# Patient Record
Sex: MALE | Race: BLACK OR AFRICAN AMERICAN | Employment: OTHER | URBAN - METROPOLITAN AREA
[De-identification: names, ages, dates, MRNs, and addresses within clinical notes are randomized per-mention and may not be internally consistent; named-entity substitution may affect disease eponyms.]

---

## 2020-07-22 ENCOUNTER — MEDICAL CORRESPONDENCE (OUTPATIENT)
Dept: HEALTH INFORMATION MANAGEMENT | Facility: CLINIC | Age: 80
End: 2020-07-22

## 2020-07-24 DIAGNOSIS — Z11.59 ENCOUNTER FOR SCREENING FOR OTHER VIRAL DISEASES: Primary | ICD-10-CM

## 2020-07-27 ENCOUNTER — TELEPHONE (OUTPATIENT)
Dept: INTERVENTIONAL RADIOLOGY/VASCULAR | Facility: CLINIC | Age: 80
End: 2020-07-27

## 2020-07-27 DIAGNOSIS — R18.8 OTHER ASCITES: ICD-10-CM

## 2020-07-27 DIAGNOSIS — K74.60 LIVER CIRRHOSIS SECONDARY TO NASH (H): Primary | ICD-10-CM

## 2020-07-27 DIAGNOSIS — K75.81 LIVER CIRRHOSIS SECONDARY TO NASH (H): Primary | ICD-10-CM

## 2020-07-27 DIAGNOSIS — K74.60 CIRRHOSIS OF LIVER (H): ICD-10-CM

## 2020-07-30 ENCOUNTER — HOSPITAL ENCOUNTER (OUTPATIENT)
Dept: ULTRASOUND IMAGING | Facility: CLINIC | Age: 80
End: 2020-07-30
Attending: INTERNAL MEDICINE
Payer: MEDICARE

## 2020-07-30 ENCOUNTER — HOSPITAL ENCOUNTER (OUTPATIENT)
Dept: LAB | Facility: CLINIC | Age: 80
End: 2020-07-30
Attending: INTERNAL MEDICINE
Payer: MEDICARE

## 2020-07-30 VITALS
RESPIRATION RATE: 16 BRPM | HEART RATE: 67 BPM | SYSTOLIC BLOOD PRESSURE: 129 MMHG | DIASTOLIC BLOOD PRESSURE: 66 MMHG | OXYGEN SATURATION: 98 %

## 2020-07-30 DIAGNOSIS — K75.81 LIVER CIRRHOSIS SECONDARY TO NASH (H): ICD-10-CM

## 2020-07-30 DIAGNOSIS — K74.60 UNSPECIFIED CIRRHOSIS OF LIVER (H): ICD-10-CM

## 2020-07-30 DIAGNOSIS — R18.8 OTHER ASCITES: ICD-10-CM

## 2020-07-30 DIAGNOSIS — K74.60 LIVER CIRRHOSIS SECONDARY TO NASH (H): ICD-10-CM

## 2020-07-30 DIAGNOSIS — K74.60 CIRRHOSIS OF LIVER (H): ICD-10-CM

## 2020-07-30 LAB
AFP SERPL-MCNC: 4.8 UG/L (ref 0–8)
ALBUMIN SERPL-MCNC: 2.7 G/DL (ref 3.4–5)
ALP SERPL-CCNC: 87 U/L (ref 40–150)
ALT SERPL W P-5'-P-CCNC: 14 U/L (ref 0–70)
ANION GAP SERPL CALCULATED.3IONS-SCNC: 6 MMOL/L (ref 3–14)
APPEARANCE FLD: NORMAL
AST SERPL W P-5'-P-CCNC: 27 U/L (ref 0–45)
BILIRUB DIRECT SERPL-MCNC: 0.7 MG/DL (ref 0–0.2)
BILIRUB SERPL-MCNC: 2.3 MG/DL (ref 0.2–1.3)
BUN SERPL-MCNC: 9 MG/DL (ref 7–30)
CALCIUM SERPL-MCNC: 8.1 MG/DL (ref 8.5–10.1)
CHLORIDE SERPL-SCNC: 105 MMOL/L (ref 94–109)
CO2 SERPL-SCNC: 26 MMOL/L (ref 20–32)
COLOR FLD: YELLOW
CREAT SERPL-MCNC: 0.74 MG/DL (ref 0.66–1.25)
ERYTHROCYTE [DISTWIDTH] IN BLOOD BY AUTOMATED COUNT: 14.8 % (ref 10–15)
GFR SERPL CREATININE-BSD FRML MDRD: 88 ML/MIN/{1.73_M2}
GLUCOSE SERPL-MCNC: 94 MG/DL (ref 70–99)
GRAM STN SPEC: NORMAL
HCT VFR BLD AUTO: 43.3 % (ref 40–53)
HGB BLD-MCNC: 13.9 G/DL (ref 13.3–17.7)
INR PPP: 1.69 (ref 0.86–1.14)
LYMPHOCYTES NFR FLD MANUAL: 27 %
MCH RBC QN AUTO: 28.3 PG (ref 26.5–33)
MCHC RBC AUTO-ENTMCNC: 32.1 G/DL (ref 31.5–36.5)
MCV RBC AUTO: 88 FL (ref 78–100)
MONOS+MACROS NFR FLD MANUAL: 37 %
NEUTS BAND NFR FLD MANUAL: 35 %
OTHER CELLS FLD MANUAL: 1 %
PLATELET # BLD AUTO: 93 10E9/L (ref 150–450)
POTASSIUM SERPL-SCNC: 4 MMOL/L (ref 3.4–5.3)
PROT FLD-MCNC: 1.4 G/DL
PROT SERPL-MCNC: 7.7 G/DL (ref 6.8–8.8)
RBC # BLD AUTO: 4.91 10E12/L (ref 4.4–5.9)
SODIUM SERPL-SCNC: 137 MMOL/L (ref 133–144)
SPECIMEN SOURCE FLD: NORMAL
SPECIMEN SOURCE FLD: NORMAL
SPECIMEN SOURCE: NORMAL
WBC # BLD AUTO: 4.5 10E9/L (ref 4–11)
WBC # FLD AUTO: 355 /UL

## 2020-07-30 PROCEDURE — 82105 ALPHA-FETOPROTEIN SERUM: CPT | Performed by: INTERNAL MEDICINE

## 2020-07-30 PROCEDURE — 87070 CULTURE OTHR SPECIMN AEROBIC: CPT | Performed by: RADIOLOGY

## 2020-07-30 PROCEDURE — 87015 SPECIMEN INFECT AGNT CONCNTJ: CPT | Performed by: RADIOLOGY

## 2020-07-30 PROCEDURE — 84157 ASSAY OF PROTEIN OTHER: CPT | Performed by: RADIOLOGY

## 2020-07-30 PROCEDURE — 87205 SMEAR GRAM STAIN: CPT | Performed by: RADIOLOGY

## 2020-07-30 PROCEDURE — 36415 COLL VENOUS BLD VENIPUNCTURE: CPT | Performed by: INTERNAL MEDICINE

## 2020-07-30 PROCEDURE — 89051 BODY FLUID CELL COUNT: CPT | Performed by: RADIOLOGY

## 2020-07-30 PROCEDURE — 80048 BASIC METABOLIC PNL TOTAL CA: CPT | Performed by: INTERNAL MEDICINE

## 2020-07-30 PROCEDURE — 27210190 US PARACENTESIS

## 2020-07-30 PROCEDURE — P9047 ALBUMIN (HUMAN), 25%, 50ML: HCPCS

## 2020-07-30 PROCEDURE — 80076 HEPATIC FUNCTION PANEL: CPT | Performed by: INTERNAL MEDICINE

## 2020-07-30 PROCEDURE — 25000128 H RX IP 250 OP 636

## 2020-07-30 PROCEDURE — 85027 COMPLETE CBC AUTOMATED: CPT | Performed by: INTERNAL MEDICINE

## 2020-07-30 PROCEDURE — 25000125 ZZHC RX 250: Performed by: RADIOLOGY

## 2020-07-30 PROCEDURE — 85610 PROTHROMBIN TIME: CPT | Performed by: INTERNAL MEDICINE

## 2020-07-30 RX ORDER — ALBUMIN (HUMAN) 12.5 G/50ML
25 SOLUTION INTRAVENOUS ONCE
Status: COMPLETED | OUTPATIENT
Start: 2020-07-30 | End: 2020-07-30

## 2020-07-30 RX ORDER — ALBUMIN (HUMAN) 12.5 G/50ML
SOLUTION INTRAVENOUS
Status: COMPLETED
Start: 2020-07-30 | End: 2020-07-30

## 2020-07-30 RX ORDER — LIDOCAINE HYDROCHLORIDE 10 MG/ML
10 INJECTION, SOLUTION EPIDURAL; INFILTRATION; INTRACAUDAL; PERINEURAL ONCE
Status: COMPLETED | OUTPATIENT
Start: 2020-07-30 | End: 2020-07-30

## 2020-07-30 RX ADMIN — ALBUMIN HUMAN 25 G: 0.25 SOLUTION INTRAVENOUS at 15:21

## 2020-07-30 RX ADMIN — ALBUMIN (HUMAN) 25 G: 12.5 SOLUTION INTRAVENOUS at 15:21

## 2020-07-30 RX ADMIN — LIDOCAINE HYDROCHLORIDE 10 ML: 10 INJECTION, SOLUTION EPIDURAL; INFILTRATION; INTRACAUDAL; PERINEURAL at 14:32

## 2020-07-30 NOTE — PROGRESS NOTES
Paracentesis complete.  Consent obtained with Dr. Quiles.  Pt tolerated well, drained 4450mLs yellow colored fluid.  Site at RLQ.  Site covered with a bandaid.  Site CDI.  VSS.  Sample sent to lab.  Albumin 25 grams given.  IV infiltrated wrapped in coban ice pack given.  Reviewed discharge instructions with pt and son who is interpreting.  Pt wc on discharge.

## 2020-08-04 LAB
BACTERIA SPEC CULT: NO GROWTH
SPECIMEN SOURCE: NORMAL

## 2020-08-13 ENCOUNTER — HOSPITAL ENCOUNTER (OUTPATIENT)
Dept: ULTRASOUND IMAGING | Facility: CLINIC | Age: 80
Discharge: HOME | End: 2020-08-13
Attending: INTERNAL MEDICINE | Admitting: INTERNAL MEDICINE
Payer: MEDICARE

## 2020-08-13 VITALS
OXYGEN SATURATION: 99 % | RESPIRATION RATE: 16 BRPM | HEART RATE: 69 BPM | SYSTOLIC BLOOD PRESSURE: 123 MMHG | DIASTOLIC BLOOD PRESSURE: 53 MMHG

## 2020-08-13 DIAGNOSIS — R18.8 OTHER ASCITES: ICD-10-CM

## 2020-08-13 PROCEDURE — 25000125 ZZHC RX 250: Performed by: RADIOLOGY

## 2020-08-13 PROCEDURE — 27210190 US PARACENTESIS

## 2020-08-13 RX ORDER — LIDOCAINE HYDROCHLORIDE 10 MG/ML
10 INJECTION, SOLUTION EPIDURAL; INFILTRATION; INTRACAUDAL; PERINEURAL ONCE
Status: COMPLETED | OUTPATIENT
Start: 2020-08-13 | End: 2020-08-13

## 2020-08-13 RX ADMIN — LIDOCAINE HYDROCHLORIDE 10 ML: 10 INJECTION, SOLUTION EPIDURAL; INFILTRATION; INTRACAUDAL; PERINEURAL at 16:07

## 2020-08-13 NOTE — PROGRESS NOTES
Paracentesis complete.  Consent obtained with Dr. Moseley.  Pt tolerated well, drained 3650mLs yellow colored fluid.  Site at Right.  Site covered with a bandaid.  Site CDI.  Albumin not given pt refused.  Reviewed discharge instructions with pt.  Pt used wc with son on discharge.

## 2020-08-13 NOTE — PROCEDURES
[facility]    Procedure: Paracentesis    Date/Time: 8/13/2020 4:28 PM  Performed by: Rosenda Moseley DO  Authorized by: Rosenda Moseley DO     UNIVERSAL PROTOCOL   Site Marked: Yes  Prior Images Obtained and Reviewed:  Yes  Required items: Required blood products, implants, devices and special equipment available    Patient identity confirmed:  Verbally with patient, arm band, provided demographic data and hospital-assigned identification number  Patient was reevaluated immediately before administering moderate or deep sedation or anesthesia  Confirmation Checklist:  Patient's identity using two indicators, relevant allergies, procedure was appropriate and matched the consent or emergent situation and correct equipment/implants were available  Time out: Immediately prior to the procedure a time out was called    Universal Protocol: the Joint Commission Universal Protocol was followed    Preparation: Patient was prepped and draped in usual sterile fashion           ANESTHESIA    Anesthesia: Local infiltration  Local Anesthetic:  Lidocaine 1% without epinephrine      SEDATION    Patient Sedated: No    See dictated procedure note for full details.  Findings: Paracentesis.     Specimens: none    Complications: None    Condition: Stable    PROCEDURE   Patient Tolerance:  Patient tolerated the procedure well with no immediate complications    Length of time physician/provider present for 1:1 monitoring during sedation: 0

## 2020-08-27 DIAGNOSIS — Z11.59 ENCOUNTER FOR SCREENING FOR OTHER VIRAL DISEASES: Primary | ICD-10-CM

## 2020-08-28 DIAGNOSIS — Z11.59 ENCOUNTER FOR SCREENING FOR OTHER VIRAL DISEASES: ICD-10-CM

## 2020-08-28 PROCEDURE — U0003 INFECTIOUS AGENT DETECTION BY NUCLEIC ACID (DNA OR RNA); SEVERE ACUTE RESPIRATORY SYNDROME CORONAVIRUS 2 (SARS-COV-2) (CORONAVIRUS DISEASE [COVID-19]), AMPLIFIED PROBE TECHNIQUE, MAKING USE OF HIGH THROUGHPUT TECHNOLOGIES AS DESCRIBED BY CMS-2020-01-R: HCPCS | Performed by: INTERNAL MEDICINE

## 2020-08-29 LAB
SARS-COV-2 RNA SPEC QL NAA+PROBE: NOT DETECTED
SPECIMEN SOURCE: NORMAL

## 2020-08-31 ENCOUNTER — HOSPITAL ENCOUNTER (OUTPATIENT)
Dept: ULTRASOUND IMAGING | Facility: CLINIC | Age: 80
Discharge: HOME | End: 2020-08-31
Attending: INTERNAL MEDICINE | Admitting: INTERNAL MEDICINE
Payer: MEDICARE

## 2020-08-31 VITALS — DIASTOLIC BLOOD PRESSURE: 51 MMHG | HEART RATE: 66 BPM | SYSTOLIC BLOOD PRESSURE: 112 MMHG

## 2020-08-31 DIAGNOSIS — R18.8 OTHER ASCITES: ICD-10-CM

## 2020-08-31 PROCEDURE — 25000125 ZZHC RX 250: Performed by: RADIOLOGY

## 2020-08-31 PROCEDURE — 25000128 H RX IP 250 OP 636

## 2020-08-31 PROCEDURE — 49083 ABD PARACENTESIS W/IMAGING: CPT

## 2020-08-31 PROCEDURE — P9047 ALBUMIN (HUMAN), 25%, 50ML: HCPCS

## 2020-08-31 RX ORDER — LIDOCAINE HYDROCHLORIDE 10 MG/ML
10 INJECTION, SOLUTION EPIDURAL; INFILTRATION; INTRACAUDAL; PERINEURAL ONCE
Status: COMPLETED | OUTPATIENT
Start: 2020-08-31 | End: 2020-08-31

## 2020-08-31 RX ORDER — ALBUMIN (HUMAN) 12.5 G/50ML
25 SOLUTION INTRAVENOUS ONCE
Status: COMPLETED | OUTPATIENT
Start: 2020-08-31 | End: 2020-08-31

## 2020-08-31 RX ORDER — ALBUMIN (HUMAN) 12.5 G/50ML
SOLUTION INTRAVENOUS
Status: COMPLETED
Start: 2020-08-31 | End: 2020-08-31

## 2020-08-31 RX ADMIN — LIDOCAINE HYDROCHLORIDE 10 ML: 10 INJECTION, SOLUTION EPIDURAL; INFILTRATION; INTRACAUDAL; PERINEURAL at 14:33

## 2020-08-31 RX ADMIN — ALBUMIN (HUMAN) 25 G: 12.5 SOLUTION INTRAVENOUS at 14:56

## 2020-08-31 RX ADMIN — ALBUMIN HUMAN 25 G: 0.25 SOLUTION INTRAVENOUS at 14:56

## 2020-08-31 NOTE — PROGRESS NOTES
Pt was in Radiology today for a paracentesis. Procedure performed by Dr Moseley. Procedure was tolerated well, vitals remained stable.4000 cc's of ambercolored fluid removed. 25 G Albumin per protocol  Pt left department in stable satisfactory condition with son. There is no evidence of bleeding upon discharge.

## 2020-08-31 NOTE — PROCEDURES
[facility]    Procedure: Paracentesis    Date/Time: 8/31/2020 2:42 PM  Performed by: Rosenda Moseley DO  Authorized by: Rosenda Moseley DO     UNIVERSAL PROTOCOL   Site Marked: Yes  Prior Images Obtained and Reviewed:  Yes  Required items: Required blood products, implants, devices and special equipment available    Patient identity confirmed:  Verbally with patient, arm band, provided demographic data and hospital-assigned identification number  Patient was reevaluated immediately before administering moderate or deep sedation or anesthesia  Confirmation Checklist:  Patient's identity using two indicators, relevant allergies, procedure was appropriate and matched the consent or emergent situation and correct equipment/implants were available  Time out: Immediately prior to the procedure a time out was called    Universal Protocol: the Joint Commission Universal Protocol was followed    Preparation: Patient was prepped and draped in usual sterile fashion           ANESTHESIA    Anesthesia: Local infiltration  Local Anesthetic:  Lidocaine 1% without epinephrine      SEDATION    Patient Sedated: No    See dictated procedure note for full details.  Findings: paracentesis    Specimens: none    Complications: None    Condition: Stable    PROCEDURE   Patient Tolerance:  Patient tolerated the procedure well with no immediate complications    Length of time physician/provider present for 1:1 monitoring during sedation: 0

## 2020-09-15 DIAGNOSIS — Z11.59 ENCOUNTER FOR SCREENING FOR OTHER VIRAL DISEASES: Primary | ICD-10-CM

## 2020-09-16 DIAGNOSIS — Z11.59 ENCOUNTER FOR SCREENING FOR OTHER VIRAL DISEASES: Primary | ICD-10-CM

## 2020-09-26 DIAGNOSIS — Z11.59 ENCOUNTER FOR SCREENING FOR OTHER VIRAL DISEASES: ICD-10-CM

## 2020-09-26 PROCEDURE — U0003 INFECTIOUS AGENT DETECTION BY NUCLEIC ACID (DNA OR RNA); SEVERE ACUTE RESPIRATORY SYNDROME CORONAVIRUS 2 (SARS-COV-2) (CORONAVIRUS DISEASE [COVID-19]), AMPLIFIED PROBE TECHNIQUE, MAKING USE OF HIGH THROUGHPUT TECHNOLOGIES AS DESCRIBED BY CMS-2020-01-R: HCPCS | Performed by: INTERNAL MEDICINE

## 2020-09-27 LAB
SARS-COV-2 RNA SPEC QL NAA+PROBE: NOT DETECTED
SPECIMEN SOURCE: NORMAL

## 2020-09-30 ENCOUNTER — HOSPITAL ENCOUNTER (OUTPATIENT)
Dept: ULTRASOUND IMAGING | Facility: CLINIC | Age: 80
Discharge: HOME | End: 2020-09-30
Attending: INTERNAL MEDICINE | Admitting: INTERNAL MEDICINE
Payer: MEDICARE

## 2020-09-30 VITALS — SYSTOLIC BLOOD PRESSURE: 121 MMHG | HEART RATE: 71 BPM | RESPIRATION RATE: 16 BRPM | DIASTOLIC BLOOD PRESSURE: 53 MMHG

## 2020-09-30 DIAGNOSIS — R18.8 OTHER ASCITES: ICD-10-CM

## 2020-09-30 PROCEDURE — 49083 ABD PARACENTESIS W/IMAGING: CPT

## 2020-09-30 PROCEDURE — 25000125 ZZHC RX 250: Performed by: RADIOLOGY

## 2020-09-30 PROCEDURE — P9047 ALBUMIN (HUMAN), 25%, 50ML: HCPCS

## 2020-09-30 PROCEDURE — 25000128 H RX IP 250 OP 636

## 2020-09-30 RX ORDER — ALBUMIN (HUMAN) 12.5 G/50ML
SOLUTION INTRAVENOUS
Status: COMPLETED
Start: 2020-09-30 | End: 2020-09-30

## 2020-09-30 RX ORDER — ALBUMIN (HUMAN) 12.5 G/50ML
25 SOLUTION INTRAVENOUS ONCE
Status: COMPLETED | OUTPATIENT
Start: 2020-09-30 | End: 2020-09-30

## 2020-09-30 RX ADMIN — ALBUMIN HUMAN 25 G: 0.25 SOLUTION INTRAVENOUS at 13:24

## 2020-09-30 RX ADMIN — LIDOCAINE HYDROCHLORIDE 10 ML: 10 INJECTION, SOLUTION EPIDURAL; INFILTRATION; INTRACAUDAL; PERINEURAL at 13:23

## 2020-09-30 RX ADMIN — ALBUMIN (HUMAN) 25 G: 12.5 SOLUTION INTRAVENOUS at 13:24

## 2020-09-30 NOTE — PROGRESS NOTES
Paracentesis complete.  Consent obtained with Dr Fish.  Pt tolerated well, drained 5000  mLs clear demetrius colored fluid.  Site at LLQ.  Site covered with a sterile bandaid.  Site CDI.  Albumin 25 grams.  VSS.  No complications noted.  Reviewed discharge instructions with pt.  Pt stable on discharge.

## 2020-10-12 ENCOUNTER — HOSPITAL ENCOUNTER (EMERGENCY)
Facility: CLINIC | Age: 80
Discharge: HOME | End: 2020-10-12
Attending: EMERGENCY MEDICINE | Admitting: EMERGENCY MEDICINE
Payer: MEDICARE

## 2020-10-12 ENCOUNTER — AMBULATORY - HEALTHEAST (OUTPATIENT)
Dept: LAB | Facility: CLINIC | Age: 80
End: 2020-10-12

## 2020-10-12 ENCOUNTER — APPOINTMENT (OUTPATIENT)
Dept: ULTRASOUND IMAGING | Facility: CLINIC | Age: 80
End: 2020-10-12
Attending: EMERGENCY MEDICINE
Payer: MEDICARE

## 2020-10-12 VITALS
HEART RATE: 61 BPM | RESPIRATION RATE: 20 BRPM | TEMPERATURE: 98.4 F | DIASTOLIC BLOOD PRESSURE: 59 MMHG | OXYGEN SATURATION: 100 % | SYSTOLIC BLOOD PRESSURE: 105 MMHG

## 2020-10-12 DIAGNOSIS — R18.8 CIRRHOSIS OF LIVER WITH ASCITES, UNSPECIFIED HEPATIC CIRRHOSIS TYPE (H): ICD-10-CM

## 2020-10-12 DIAGNOSIS — K74.60 CIRRHOSIS OF LIVER WITH ASCITES, UNSPECIFIED HEPATIC CIRRHOSIS TYPE (H): ICD-10-CM

## 2020-10-12 DIAGNOSIS — D61.818 PANCYTOPENIA (H): ICD-10-CM

## 2020-10-12 DIAGNOSIS — Z11.59 ENCOUNTER FOR SCREENING FOR OTHER VIRAL DISEASES: ICD-10-CM

## 2020-10-12 DIAGNOSIS — Z11.59 ENCOUNTER FOR SCREENING FOR OTHER VIRAL DISEASES: Primary | ICD-10-CM

## 2020-10-12 DIAGNOSIS — E80.6 HYPERBILIRUBINEMIA: ICD-10-CM

## 2020-10-12 LAB
ABO + RH BLD: NORMAL
ABO + RH BLD: NORMAL
ALBUMIN SERPL-MCNC: 2.4 G/DL (ref 3.4–5)
ALP SERPL-CCNC: 94 U/L (ref 40–150)
ALT SERPL W P-5'-P-CCNC: 18 U/L (ref 0–70)
ANION GAP SERPL CALCULATED.3IONS-SCNC: 5 MMOL/L (ref 3–14)
AST SERPL W P-5'-P-CCNC: 31 U/L (ref 0–45)
BASOPHILS # BLD AUTO: 0 10E9/L (ref 0–0.2)
BASOPHILS NFR BLD AUTO: 0.4 %
BILIRUB SERPL-MCNC: 1.6 MG/DL (ref 0.2–1.3)
BLD GP AB SCN SERPL QL: NORMAL
BLOOD BANK CMNT PATIENT-IMP: NORMAL
BUN SERPL-MCNC: 9 MG/DL (ref 7–30)
CALCIUM SERPL-MCNC: 7.9 MG/DL (ref 8.5–10.1)
CHLORIDE SERPL-SCNC: 105 MMOL/L (ref 94–109)
CO2 SERPL-SCNC: 24 MMOL/L (ref 20–32)
CREAT SERPL-MCNC: 0.8 MG/DL (ref 0.66–1.25)
DIFFERENTIAL METHOD BLD: ABNORMAL
EOSINOPHIL # BLD AUTO: 0.1 10E9/L (ref 0–0.7)
EOSINOPHIL NFR BLD AUTO: 4.6 %
ERYTHROCYTE [DISTWIDTH] IN BLOOD BY AUTOMATED COUNT: 14.8 % (ref 10–15)
GFR SERPL CREATININE-BSD FRML MDRD: 85 ML/MIN/{1.73_M2}
GLUCOSE BLDC GLUCOMTR-MCNC: 79 MG/DL (ref 70–99)
GLUCOSE SERPL-MCNC: 81 MG/DL (ref 70–99)
HCT VFR BLD AUTO: 36.6 % (ref 40–53)
HGB BLD-MCNC: 12 G/DL (ref 13.3–17.7)
IMM GRANULOCYTES # BLD: 0 10E9/L (ref 0–0.4)
IMM GRANULOCYTES NFR BLD: 0.4 %
INR PPP: 1.71 (ref 0.86–1.14)
INTERPRETATION ECG - MUSE: NORMAL
LYMPHOCYTES # BLD AUTO: 0.3 10E9/L (ref 0.8–5.3)
LYMPHOCYTES NFR BLD AUTO: 11.2 %
MCH RBC QN AUTO: 30.1 PG (ref 26.5–33)
MCHC RBC AUTO-ENTMCNC: 32.8 G/DL (ref 31.5–36.5)
MCV RBC AUTO: 92 FL (ref 78–100)
MONOCYTES # BLD AUTO: 0.3 10E9/L (ref 0–1.3)
MONOCYTES NFR BLD AUTO: 9.5 %
NEUTROPHILS # BLD AUTO: 2.1 10E9/L (ref 1.6–8.3)
NEUTROPHILS NFR BLD AUTO: 73.9 %
NRBC # BLD AUTO: 0 10*3/UL
NRBC BLD AUTO-RTO: 0 /100
NT-PROBNP SERPL-MCNC: 330 PG/ML (ref 0–1800)
PLATELET # BLD AUTO: 72 10E9/L (ref 150–450)
POTASSIUM SERPL-SCNC: 4.5 MMOL/L (ref 3.4–5.3)
PROT SERPL-MCNC: 7.2 G/DL (ref 6.8–8.8)
RBC # BLD AUTO: 3.99 10E12/L (ref 4.4–5.9)
SODIUM SERPL-SCNC: 134 MMOL/L (ref 133–144)
SPECIMEN EXP DATE BLD: NORMAL
TROPONIN I SERPL-MCNC: <0.015 UG/L (ref 0–0.04)
WBC # BLD AUTO: 2.9 10E9/L (ref 4–11)

## 2020-10-12 PROCEDURE — 999N001017 HC STATISTIC GLUCOSE BY METER IP

## 2020-10-12 PROCEDURE — 86900 BLOOD TYPING SEROLOGIC ABO: CPT | Performed by: EMERGENCY MEDICINE

## 2020-10-12 PROCEDURE — 84484 ASSAY OF TROPONIN QUANT: CPT | Performed by: EMERGENCY MEDICINE

## 2020-10-12 PROCEDURE — 250N000009 HC RX 250: Performed by: RADIOLOGY

## 2020-10-12 PROCEDURE — 272N000021 US PARACENTESIS

## 2020-10-12 PROCEDURE — 85025 COMPLETE CBC W/AUTO DIFF WBC: CPT | Performed by: EMERGENCY MEDICINE

## 2020-10-12 PROCEDURE — P9047 ALBUMIN (HUMAN), 25%, 50ML: HCPCS

## 2020-10-12 PROCEDURE — 80053 COMPREHEN METABOLIC PANEL: CPT | Performed by: EMERGENCY MEDICINE

## 2020-10-12 PROCEDURE — 83880 ASSAY OF NATRIURETIC PEPTIDE: CPT | Performed by: EMERGENCY MEDICINE

## 2020-10-12 PROCEDURE — 49083 ABD PARACENTESIS W/IMAGING: CPT

## 2020-10-12 PROCEDURE — 250N000011 HC RX IP 250 OP 636

## 2020-10-12 PROCEDURE — 99284 EMERGENCY DEPT VISIT MOD MDM: CPT | Mod: 25

## 2020-10-12 PROCEDURE — 85610 PROTHROMBIN TIME: CPT | Performed by: EMERGENCY MEDICINE

## 2020-10-12 PROCEDURE — 86901 BLOOD TYPING SEROLOGIC RH(D): CPT | Performed by: EMERGENCY MEDICINE

## 2020-10-12 PROCEDURE — 93005 ELECTROCARDIOGRAM TRACING: CPT

## 2020-10-12 PROCEDURE — 96365 THER/PROPH/DIAG IV INF INIT: CPT | Mod: 59

## 2020-10-12 PROCEDURE — 86850 RBC ANTIBODY SCREEN: CPT | Performed by: EMERGENCY MEDICINE

## 2020-10-12 RX ORDER — ALBUMIN (HUMAN) 12.5 G/50ML
25 SOLUTION INTRAVENOUS ONCE
Status: COMPLETED | OUTPATIENT
Start: 2020-10-12 | End: 2020-10-12

## 2020-10-12 RX ORDER — DEXTROSE MONOHYDRATE 25 G/50ML
25-50 INJECTION, SOLUTION INTRAVENOUS
Status: CANCELLED | OUTPATIENT
Start: 2020-10-12

## 2020-10-12 RX ORDER — LIDOCAINE 40 MG/G
CREAM TOPICAL
Status: CANCELLED | OUTPATIENT
Start: 2020-10-12

## 2020-10-12 RX ORDER — LIDOCAINE HYDROCHLORIDE 10 MG/ML
10 INJECTION, SOLUTION EPIDURAL; INFILTRATION; INTRACAUDAL; PERINEURAL ONCE
Status: COMPLETED | OUTPATIENT
Start: 2020-10-12 | End: 2020-10-12

## 2020-10-12 RX ORDER — NICOTINE POLACRILEX 4 MG
15-30 LOZENGE BUCCAL
Status: CANCELLED | OUTPATIENT
Start: 2020-10-12

## 2020-10-12 RX ORDER — ALBUMIN (HUMAN) 12.5 G/50ML
SOLUTION INTRAVENOUS
Status: COMPLETED
Start: 2020-10-12 | End: 2020-10-12

## 2020-10-12 RX ADMIN — ALBUMIN (HUMAN) 25 G: 12.5 SOLUTION INTRAVENOUS at 13:13

## 2020-10-12 RX ADMIN — LIDOCAINE HYDROCHLORIDE 10 ML: 10 INJECTION, SOLUTION EPIDURAL; INFILTRATION; INTRACAUDAL; PERINEURAL at 12:50

## 2020-10-12 RX ADMIN — ALBUMIN HUMAN 25 G: 0.25 SOLUTION INTRAVENOUS at 13:13

## 2020-10-12 ASSESSMENT — ENCOUNTER SYMPTOMS
VOMITING: 0
ABDOMINAL PAIN: 1
NAUSEA: 0
BLOOD IN STOOL: 0
FEVER: 0
SHORTNESS OF BREATH: 1
ABDOMINAL DISTENTION: 1
COUGH: 0

## 2020-10-12 NOTE — IR NOTE
ULTRASOUND GUIDED PARACENTESIS   10/12/2020 1:17 PM     HISTORY:  Ascites    PROCEDURE:   Informed consent was obtained from the patient prior to the procedure with discussion including the possible risks of bleeding, infection and organ injury . Using 5 mL of 1% lidocaine for local anesthesia, sterile technique, and sonographic guidance with permanent image documentation, I placed an 8F paracentesis catheter into the peritoneal fluid collection. This was used to aspirate 5400 mL of yellow, serous fluid in vacuum bottles, and some of this was sent for any laboratory studies that had been ordered. There were no immediate complications.  Intravenous albumen replacement was performed according to protocol.    IMPRESSION:  Ultrasound guided paracentesis.

## 2020-10-12 NOTE — ED PROVIDER NOTES
History     Chief Complaint:  Abdominal pain/distention  The history is provided by a relative.  used: Professional  offered, patient's daughter in law interpreted       Robb Vallejo is a 80 year old male with a history of cirrhosis, dementia, and hypertension who presents with abdominal pain and distention. The patient's daughter-in-law reports, the patient normally has a paracentesis every 2 weeks and his last one was 9/30, but he was sent here today for a paracentesis secondary to worsening shortness of breath for the past 3 days. He denies black/bloody stools, fever, cough, nausea, and vomiting, urinary complaints or other symptoms.     Allergies:  Penicillins      Medications:    Corgard  Maxzide    Past Medical History:    Syncopal episodes   Cirrhosis   Dementia  Hypertension     Past Surgical History:    History reviewed. No pertinent surgical history.     Family History:    History reviewed. No pertinent family history.      Social History:  Smoking status- unknown if ever smoker  Alcohol use- no  Drug use- no   Marital Status:       Review of Systems   Constitutional: Negative for fever.   Respiratory: Positive for shortness of breath. Negative for cough.    Gastrointestinal: Positive for abdominal distention and abdominal pain. Negative for blood in stool, nausea and vomiting.   All other systems reviewed and are negative.    Physical Exam     Patient Vitals for the past 24 hrs:   BP Temp Temp src Pulse Resp SpO2   10/12/20 1300 117/57 -- -- 64 -- 100 %   10/12/20 1200 119/64 -- -- 64 -- 97 %   10/12/20 1109 114/58 98.4  F (36.9  C) Oral 67 20 98 %     Physical Exam  Nursing note and vitals reviewed.  Constitutional: Thin appearing. Resting comfortably.   Eyes: Conjunctiva normal.  Pupils are equal, round, and reactive to light.   ENT: Nose normal. Mucous membranes pink and moist.    Neck: Normal range of motion.  CVS: Normal rate, regular rhythm.  Normal heart  sounds.  Systolic murmur  Pulmonary: Lungs clear to auscultation bilaterally. No wheezes/rales/rhonchi.  GI: Abdomen distended, nontender. No rigidity or guarding.    MSK: No calf tenderness or swelling.  Neuro: Alert. Follows simple commands.  Skin: Skin is warm and dry. No rash noted.   Psychiatric: Normal affect.       Emergency Department Course     ECG (12:03:09):  Rate 62 bpm. AK interval 154. QRS duration 80. QT/QTc 422/428. P-R-T axes 11 -1 40. Normal sinus rhythm. Low voltage QRS. Borderline ECG. Interpreted at  1203 by Peri Thomas DO.     Imaging:  Radiology findings were communicated with the patient who voiced understanding of the findings.    US Paracentesis  Ultrasound guided paracentesis.  As read by Radiology.     Laboratory:  Laboratory findings were communicated with the patient who voiced understanding of the findings.    CBC: WBC 2.9 (L), RBC Count 3.99 (L), HGB 12.0 (L), Hematocrit 36.6 (L), PLT 72 (L), Absolute Lymphocyte 0.3 (L), o/w WNL   CMP: Calcium: 7.9 (L), Bilirubin Total: 1.6 (H), Albumin: 2.4 (L), o/w WNL (Creatinine: 0.80)    INR: 1.71 (H)    Troponin (Collected 1134): <0.015    ABO/Rh type and screen ABO: O, Antibody negative  Nt probnp inpatient (BNP): 330   Glucose by meter (Collected 1144): 79       Interventions:  1250 lidocaine 1% 10 mL Subcutaneous    1313 Albumin Human 25% 25 g IV     Emergency Department Course:  Past medical records, nursing notes, and vitals reviewed.    1132 I performed an exam of the patient as documented above.     1134 IV was inserted and blood was drawn for laboratory testing, results above.    1203 EKG obtained in the ED, see results above.     1228 I spoke to Dr. Yang of IR regarding the patient's case     1240 The patient was sent for a US paracentesis while in the emergency department, results above.     1323 I rechecked the patient and discussed the results of the ED workup thus far.     Findings and plan explained to the Patient.  Patient discharged home with instructions regarding supportive care, medications, and reasons to return. The importance of close follow-up was reviewed.     Impression & Plan     Medical Decision Making:  Patient is a 80-year-old male with known history of cirrhosis requiring biweekly paracenteses presenting with abdominal pain/distention in association with mild dyspnea.  Patient is nontoxic on arrival.  He has no significant abdominal tenderness on exam.  He is not septic appearing.  EKG without focal ischemia; screening troponin negative; he denies any active chest pain and I doubt ACS.  Patient with clear lungs, clinically doubt pneumonia, no indication for emergent CXR.  Clinically low suspicion for intra-abdominal catastrophe including but not limited to SBP.  He is noted to have pancytopenia with slightly down trended hemoglobin but denies any black or bloody stools.  Patient underwent therapeutic paracentesis without complication.  He did receive albumin post procedure per recommendations for IR.  He reported significant symptom improvement and feels comfortable going home at this time.  Family comfortable with plan of care as well.  Instructed to return to the ED for fever, worsening abdominal pain or should symptoms worsen.  Planning close outpatient follow-up.    Diagnosis:    ICD-10-CM   1. Cirrhosis of liver with ascites, unspecified hepatic cirrhosis type (H)  K74.60    R18.8   2. Pancytopenia (H)  D61.818   3. Hyperbilirubinemia  E80.6     Disposition:  Discharged to home.    Scribe Disclosure:  Ally CANALES, am serving as a scribe at 11:30 AM on 10/12/2020 to document services personally performed by Peri Thomas DO based on my observations and the provider's statements to me.        Peri Thomas DO  10/12/20 [id number]

## 2020-10-12 NOTE — ED NOTES
Pt was in Radiology today for a paracentesis. Consent signed by pt and daughter in law . Procedure performed by Dr Yang Procedure was tolerated well, vitals remained stable. 5400 cc's of colored fluid removed. Pt left department in stable satisfactory condition with US technologist. Albumin started per protocol (25 Grams)  There is no evidence of bleeding upon discharge.

## 2020-10-12 NOTE — ED TRIAGE NOTES
Pt with a hx of cirosis, was getting paracentesis every 2 weeks. Family states he is more sob and distended.  Were sent to ed for a paracentesis.

## 2020-10-13 DIAGNOSIS — Z11.59 ENCOUNTER FOR SCREENING FOR OTHER VIRAL DISEASES: Primary | ICD-10-CM

## 2020-10-14 ENCOUNTER — COMMUNICATION - HEALTHEAST (OUTPATIENT)
Dept: SCHEDULING | Facility: CLINIC | Age: 80
End: 2020-10-14

## 2020-10-22 ENCOUNTER — HOSPITAL ENCOUNTER (OUTPATIENT)
Dept: ULTRASOUND IMAGING | Facility: CLINIC | Age: 80
Discharge: HOME | End: 2020-10-22
Attending: INTERNAL MEDICINE | Admitting: INTERNAL MEDICINE
Payer: MEDICARE

## 2020-10-22 VITALS — OXYGEN SATURATION: 98 % | HEART RATE: 66 BPM | SYSTOLIC BLOOD PRESSURE: 141 MMHG | DIASTOLIC BLOOD PRESSURE: 60 MMHG

## 2020-10-22 DIAGNOSIS — R18.8 OTHER ASCITES: ICD-10-CM

## 2020-10-22 PROCEDURE — 250N000009 HC RX 250: Performed by: RADIOLOGY

## 2020-10-22 PROCEDURE — 272N000021 US PARACENTESIS

## 2020-10-22 PROCEDURE — P9047 ALBUMIN (HUMAN), 25%, 50ML: HCPCS

## 2020-10-22 PROCEDURE — 250N000011 HC RX IP 250 OP 636

## 2020-10-22 RX ORDER — ALBUMIN (HUMAN) 12.5 G/50ML
50 SOLUTION INTRAVENOUS ONCE
Status: COMPLETED | OUTPATIENT
Start: 2020-10-22 | End: 2020-10-22

## 2020-10-22 RX ORDER — ALBUMIN (HUMAN) 12.5 G/50ML
SOLUTION INTRAVENOUS
Status: COMPLETED
Start: 2020-10-22 | End: 2020-10-22

## 2020-10-22 RX ADMIN — LIDOCAINE HYDROCHLORIDE 10 ML: 10 INJECTION, SOLUTION EPIDURAL; INFILTRATION; INTRACAUDAL; PERINEURAL at 13:07

## 2020-10-22 RX ADMIN — ALBUMIN HUMAN 50 G: 0.25 SOLUTION INTRAVENOUS at 13:19

## 2020-10-22 RX ADMIN — ALBUMIN (HUMAN) 50 G: 12.5 SOLUTION INTRAVENOUS at 13:19

## 2020-10-22 NOTE — PROGRESS NOTES
Pt tolerated paracentesis by Dr. Quiles for toatal 3950 ml's yellow fluid well.  IV started and 50 grams albumin given.  Vital signs stable thru out procedure.  Sterile dressing to site dry on ambulatory discharge to home with son.

## 2020-10-22 NOTE — PROCEDURES
[facility]    Procedure: Imaging Procedure Note    Date/Time: 10/22/2020 1:19 PM  Performed by: Socrates Quiles MD  Authorized by: Socrates Quiles MD     UNIVERSAL PROTOCOL   Site Marked: Yes  Prior Images Obtained and Reviewed:  Yes  Required items: Required blood products, implants, devices and special equipment available    Patient identity confirmed:  Verbally with patient, arm band and provided demographic data  Patient was reevaluated immediately before administering moderate or deep sedation or anesthesia  Confirmation Checklist:  Patient's identity using two indicators, relevant allergies, procedure was appropriate and matched the consent or emergent situation and correct equipment/implants were available  Time out: Immediately prior to the procedure a time out was called    Universal Protocol: the Joint Commission Universal Protocol was followed    Preparation: Patient was prepped and draped in usual sterile fashion    ESBL (mL):  0         ANESTHESIA    Anesthesia: Local infiltration  Local Anesthetic:  Lidocaine 1% without epinephrine  Anesthetic Total (mL):  10      SEDATION    Patient Sedated: No    See dictated procedure note for full details.  PROCEDURE   Patient Tolerance:  Patient tolerated the procedure well with no immediate complications  Describe Procedure: RLQ Paracentesis for therapeutic intent  Length of time physician/provider present for 1:1 monitoring during sedation: 0

## 2020-10-26 DIAGNOSIS — Z11.59 ENCOUNTER FOR SCREENING FOR OTHER VIRAL DISEASES: ICD-10-CM

## 2020-10-26 PROCEDURE — U0003 INFECTIOUS AGENT DETECTION BY NUCLEIC ACID (DNA OR RNA); SEVERE ACUTE RESPIRATORY SYNDROME CORONAVIRUS 2 (SARS-COV-2) (CORONAVIRUS DISEASE [COVID-19]), AMPLIFIED PROBE TECHNIQUE, MAKING USE OF HIGH THROUGHPUT TECHNOLOGIES AS DESCRIBED BY CMS-2020-01-R: HCPCS | Performed by: INTERNAL MEDICINE

## 2020-10-27 LAB
SARS-COV-2 RNA SPEC QL NAA+PROBE: NOT DETECTED
SPECIMEN SOURCE: NORMAL

## 2020-10-30 ENCOUNTER — HOSPITAL ENCOUNTER (OUTPATIENT)
Dept: ULTRASOUND IMAGING | Facility: CLINIC | Age: 80
Discharge: HOME | End: 2020-10-30
Attending: INTERNAL MEDICINE | Admitting: INTERNAL MEDICINE
Payer: MEDICARE

## 2020-10-30 VITALS — DIASTOLIC BLOOD PRESSURE: 55 MMHG | SYSTOLIC BLOOD PRESSURE: 110 MMHG | OXYGEN SATURATION: 100 % | HEART RATE: 62 BPM

## 2020-10-30 DIAGNOSIS — Z11.59 ENCOUNTER FOR SCREENING FOR OTHER VIRAL DISEASES: Primary | ICD-10-CM

## 2020-10-30 DIAGNOSIS — R18.8 OTHER ASCITES: ICD-10-CM

## 2020-10-30 PROCEDURE — 250N000009 HC RX 250: Performed by: RADIOLOGY

## 2020-10-30 PROCEDURE — P9047 ALBUMIN (HUMAN), 25%, 50ML: HCPCS

## 2020-10-30 PROCEDURE — 49083 ABD PARACENTESIS W/IMAGING: CPT

## 2020-10-30 PROCEDURE — 250N000011 HC RX IP 250 OP 636

## 2020-10-30 RX ORDER — ALBUMIN (HUMAN) 12.5 G/50ML
25 SOLUTION INTRAVENOUS ONCE
Status: COMPLETED | OUTPATIENT
Start: 2020-10-30 | End: 2020-10-30

## 2020-10-30 RX ORDER — LIDOCAINE HYDROCHLORIDE 10 MG/ML
10 INJECTION, SOLUTION EPIDURAL; INFILTRATION; INTRACAUDAL; PERINEURAL ONCE
Status: COMPLETED | OUTPATIENT
Start: 2020-10-30 | End: 2020-10-30

## 2020-10-30 RX ORDER — ALBUMIN (HUMAN) 12.5 G/50ML
SOLUTION INTRAVENOUS
Status: COMPLETED
Start: 2020-10-30 | End: 2020-10-30

## 2020-10-30 RX ADMIN — ALBUMIN HUMAN 25 G: 0.25 SOLUTION INTRAVENOUS at 14:37

## 2020-10-30 RX ADMIN — ALBUMIN (HUMAN) 25 G: 12.5 SOLUTION INTRAVENOUS at 14:37

## 2020-10-30 RX ADMIN — ALBUMIN HUMAN 25 G: 0.25 SOLUTION INTRAVENOUS at 14:47

## 2020-10-30 RX ADMIN — LIDOCAINE HYDROCHLORIDE 10 ML: 10 INJECTION, SOLUTION EPIDURAL; INFILTRATION; INTRACAUDAL; PERINEURAL at 14:25

## 2020-10-30 RX ADMIN — ALBUMIN (HUMAN) 25 G: 12.5 SOLUTION INTRAVENOUS at 14:47

## 2020-10-30 NOTE — PROGRESS NOTES
Paracentesis complete. Consent obtained with Dr. Almaraz.  Pt tolerated well, drained 5200mls straw colored fluid. Site at [facility]. Site covered with bandage. 50grams albumin given. Reviewed discharge instructions with pt and son. Pt ambulated on discharge.

## 2020-11-02 DIAGNOSIS — Z11.59 ENCOUNTER FOR SCREENING FOR OTHER VIRAL DISEASES: Primary | ICD-10-CM

## 2020-11-06 ENCOUNTER — HOSPITAL ENCOUNTER (OUTPATIENT)
Dept: ULTRASOUND IMAGING | Facility: CLINIC | Age: 80
Discharge: HOME | End: 2020-11-06
Attending: INTERNAL MEDICINE | Admitting: INTERNAL MEDICINE
Payer: MEDICARE

## 2020-11-06 VITALS — SYSTOLIC BLOOD PRESSURE: 117 MMHG | DIASTOLIC BLOOD PRESSURE: 61 MMHG

## 2020-11-06 DIAGNOSIS — R18.8 OTHER ASCITES: ICD-10-CM

## 2020-11-06 PROCEDURE — 250N000011 HC RX IP 250 OP 636

## 2020-11-06 PROCEDURE — P9047 ALBUMIN (HUMAN), 25%, 50ML: HCPCS

## 2020-11-06 PROCEDURE — 49083 ABD PARACENTESIS W/IMAGING: CPT

## 2020-11-06 PROCEDURE — 250N000009 HC RX 250: Performed by: RADIOLOGY

## 2020-11-06 RX ORDER — LIDOCAINE HYDROCHLORIDE 10 MG/ML
10 INJECTION, SOLUTION EPIDURAL; INFILTRATION; INTRACAUDAL; PERINEURAL ONCE
Status: COMPLETED | OUTPATIENT
Start: 2020-11-06 | End: 2020-11-06

## 2020-11-06 RX ORDER — DEXTROSE MONOHYDRATE 25 G/50ML
25-50 INJECTION, SOLUTION INTRAVENOUS
Status: CANCELLED | OUTPATIENT
Start: 2020-11-06

## 2020-11-06 RX ORDER — ALBUMIN (HUMAN) 12.5 G/50ML
50 SOLUTION INTRAVENOUS ONCE
Status: COMPLETED | OUTPATIENT
Start: 2020-11-06 | End: 2020-11-06

## 2020-11-06 RX ORDER — NICOTINE POLACRILEX 4 MG
15-30 LOZENGE BUCCAL
Status: CANCELLED | OUTPATIENT
Start: 2020-11-06

## 2020-11-06 RX ORDER — ALBUMIN (HUMAN) 12.5 G/50ML
SOLUTION INTRAVENOUS
Status: COMPLETED
Start: 2020-11-06 | End: 2020-11-06

## 2020-11-06 RX ADMIN — ALBUMIN HUMAN 50 G: 0.25 SOLUTION INTRAVENOUS at 14:44

## 2020-11-06 RX ADMIN — ALBUMIN (HUMAN) 50 G: 12.5 SOLUTION INTRAVENOUS at 14:44

## 2020-11-06 RX ADMIN — LIDOCAINE HYDROCHLORIDE 10 ML: 10 INJECTION, SOLUTION EPIDURAL; INFILTRATION; INTRACAUDAL; PERINEURAL at 14:39

## 2020-11-06 NOTE — PROGRESS NOTES
Paracentesis complete. Consent obtained with .  Pt tolerated well, drained mls straw colored fluid. Site at LLQ. Site covered with bandage. Pt received 50 grams albumin. Pt developed small golf ball sized hematoma after pulling para catheter from abdomen. Pressure held, and glue applied to site. Pt then layed on his side on top of a rolled towel for 15 minutes to apply more pressure. Site assessed by Dr. Fish. Reviewed discharge instructions with pt and son. Pt ambulated on discharge.

## 2020-11-09 DIAGNOSIS — Z11.59 ENCOUNTER FOR SCREENING FOR OTHER VIRAL DISEASES: ICD-10-CM

## 2020-11-09 PROCEDURE — 99000 SPECIMEN HANDLING OFFICE-LAB: CPT | Performed by: PATHOLOGY

## 2020-11-09 PROCEDURE — U0003 INFECTIOUS AGENT DETECTION BY NUCLEIC ACID (DNA OR RNA); SEVERE ACUTE RESPIRATORY SYNDROME CORONAVIRUS 2 (SARS-COV-2) (CORONAVIRUS DISEASE [COVID-19]), AMPLIFIED PROBE TECHNIQUE, MAKING USE OF HIGH THROUGHPUT TECHNOLOGIES AS DESCRIBED BY CMS-2020-01-R: HCPCS | Mod: 90 | Performed by: PATHOLOGY

## 2020-11-10 LAB
SARS-COV-2 RNA SPEC QL NAA+PROBE: NOT DETECTED
SPECIMEN SOURCE: NORMAL

## 2020-11-12 ENCOUNTER — HOSPITAL ENCOUNTER (OUTPATIENT)
Dept: ULTRASOUND IMAGING | Facility: CLINIC | Age: 80
Discharge: HOME | End: 2020-11-12
Attending: INTERNAL MEDICINE | Admitting: INTERNAL MEDICINE
Payer: MEDICARE

## 2020-11-12 VITALS
HEART RATE: 62 BPM | SYSTOLIC BLOOD PRESSURE: 109 MMHG | RESPIRATION RATE: 16 BRPM | DIASTOLIC BLOOD PRESSURE: 44 MMHG | OXYGEN SATURATION: 100 %

## 2020-11-12 DIAGNOSIS — R18.8 CHRONIC PERITONEAL EFFUSION: ICD-10-CM

## 2020-11-12 DIAGNOSIS — R18.8 OTHER ASCITES: ICD-10-CM

## 2020-11-12 PROCEDURE — 250N000009 HC RX 250: Performed by: RADIOLOGY

## 2020-11-12 PROCEDURE — P9047 ALBUMIN (HUMAN), 25%, 50ML: HCPCS | Performed by: RADIOLOGY

## 2020-11-12 PROCEDURE — 250N000011 HC RX IP 250 OP 636: Performed by: RADIOLOGY

## 2020-11-12 PROCEDURE — 272N000021 US PARACENTESIS

## 2020-11-12 RX ORDER — ALBUMIN (HUMAN) 12.5 G/50ML
25 SOLUTION INTRAVENOUS ONCE
Status: COMPLETED | OUTPATIENT
Start: 2020-11-12 | End: 2020-11-12

## 2020-11-12 RX ORDER — LIDOCAINE HYDROCHLORIDE 10 MG/ML
10 INJECTION, SOLUTION EPIDURAL; INFILTRATION; INTRACAUDAL; PERINEURAL ONCE
Status: COMPLETED | OUTPATIENT
Start: 2020-11-12 | End: 2020-11-12

## 2020-11-12 RX ORDER — ALBUMIN (HUMAN) 12.5 G/50ML
SOLUTION INTRAVENOUS
Status: DISCONTINUED
Start: 2020-11-12 | End: 2020-11-13 | Stop reason: HOSPADM

## 2020-11-12 RX ADMIN — ALBUMIN HUMAN 25 G: 0.25 SOLUTION INTRAVENOUS at 14:57

## 2020-11-12 RX ADMIN — LIDOCAINE HYDROCHLORIDE 10 ML: 10 INJECTION, SOLUTION EPIDURAL; INFILTRATION; INTRACAUDAL; PERINEURAL at 14:16

## 2020-11-12 NOTE — PROGRESS NOTES
Paracentesis complete.  Consent obtained with Dr. Harrington.  Pt tolerated well, drained 4100mLs demetrius colored fluid.  Site at RLQ.  Site covered with a bandaid.  Site CDI.  Albumin 25 grams.  VSS.  No complications noted.  Son .  Reviewed discharge instructions with pt.  Pt ambulated with cane on discharge.

## 2020-11-19 ENCOUNTER — HOSPITAL ENCOUNTER (OUTPATIENT)
Dept: ULTRASOUND IMAGING | Facility: CLINIC | Age: 80
Discharge: HOME | End: 2020-11-19
Attending: INTERNAL MEDICINE | Admitting: INTERNAL MEDICINE
Payer: MEDICARE

## 2020-11-19 VITALS
HEART RATE: 60 BPM | SYSTOLIC BLOOD PRESSURE: 104 MMHG | RESPIRATION RATE: 16 BRPM | DIASTOLIC BLOOD PRESSURE: 53 MMHG | OXYGEN SATURATION: 100 %

## 2020-11-19 DIAGNOSIS — R18.8 OTHER ASCITES: ICD-10-CM

## 2020-11-19 PROCEDURE — 250N000011 HC RX IP 250 OP 636

## 2020-11-19 PROCEDURE — P9047 ALBUMIN (HUMAN), 25%, 50ML: HCPCS

## 2020-11-19 PROCEDURE — 250N000009 HC RX 250: Performed by: RADIOLOGY

## 2020-11-19 PROCEDURE — 272N000021 US PARACENTESIS

## 2020-11-19 RX ORDER — LIDOCAINE HYDROCHLORIDE 10 MG/ML
10 INJECTION, SOLUTION EPIDURAL; INFILTRATION; INTRACAUDAL; PERINEURAL ONCE
Status: COMPLETED | OUTPATIENT
Start: 2020-11-19 | End: 2020-11-19

## 2020-11-19 RX ORDER — ALBUMIN (HUMAN) 12.5 G/50ML
25 SOLUTION INTRAVENOUS ONCE
Status: COMPLETED | OUTPATIENT
Start: 2020-11-19 | End: 2020-11-19

## 2020-11-19 RX ORDER — ALBUMIN (HUMAN) 12.5 G/50ML
SOLUTION INTRAVENOUS
Status: COMPLETED
Start: 2020-11-19 | End: 2020-11-19

## 2020-11-19 RX ADMIN — ALBUMIN HUMAN 25 G: 0.25 SOLUTION INTRAVENOUS at 14:05

## 2020-11-19 RX ADMIN — ALBUMIN (HUMAN) 25 G: 12.5 SOLUTION INTRAVENOUS at 14:05

## 2020-11-19 RX ADMIN — LIDOCAINE HYDROCHLORIDE 10 ML: 10 INJECTION, SOLUTION EPIDURAL; INFILTRATION; INTRACAUDAL; PERINEURAL at 15:50

## 2020-11-19 NOTE — PROCEDURES
[facility]    Procedure: Imaging Procedure Note    Date/Time: 11/19/2020 5:23 PM  Performed by: Socrates Quiles MD  Authorized by: Socrates Quiles MD     UNIVERSAL PROTOCOL   Site Marked: Yes  Prior Images Obtained and Reviewed:  Yes  Required items: Required blood products, implants, devices and special equipment available    Patient identity confirmed:  Verbally with patient, arm band and provided demographic data  Patient was reevaluated immediately before administering moderate or deep sedation or anesthesia  Confirmation Checklist:  Patient's identity using two indicators, relevant allergies, procedure was appropriate and matched the consent or emergent situation and correct equipment/implants were available  Time out: Immediately prior to the procedure a time out was called    Universal Protocol: the Joint Commission Universal Protocol was followed    Preparation: Patient was prepped and draped in usual sterile fashion    ESBL (mL):  0         ANESTHESIA    Anesthesia: Local infiltration  Local Anesthetic:  Lidocaine 1% without epinephrine  Anesthetic Total (mL):  10      SEDATION    Patient Sedated: No    See dictated procedure note for full details.  PROCEDURE   Patient Tolerance:  Patient tolerated the procedure well with no immediate complications  Describe Procedure: RLQ Paracentesis for therapeutic intent  Length of time physician/provider present for 1:1 monitoring during sedation: 0

## 2020-11-19 NOTE — PROGRESS NOTES
Paracentesis complete.  Consent obtained with Dr. Quiles.  Pt tolerated well, drained 4600mLs yellow colored fluid.  Site at RLQ.  Site covered with a bandaid.  Site CDI.  Albumin 25 grams.  VSS.  No complications noted.  Reviewed discharge instructions with pt.  Pt ambulated on discharge.

## 2020-11-21 DIAGNOSIS — Z11.59 ENCOUNTER FOR SCREENING FOR OTHER VIRAL DISEASES: ICD-10-CM

## 2020-11-21 PROCEDURE — U0003 INFECTIOUS AGENT DETECTION BY NUCLEIC ACID (DNA OR RNA); SEVERE ACUTE RESPIRATORY SYNDROME CORONAVIRUS 2 (SARS-COV-2) (CORONAVIRUS DISEASE [COVID-19]), AMPLIFIED PROBE TECHNIQUE, MAKING USE OF HIGH THROUGHPUT TECHNOLOGIES AS DESCRIBED BY CMS-2020-01-R: HCPCS | Performed by: INTERNAL MEDICINE

## 2020-11-22 LAB
SARS-COV-2 RNA SPEC QL NAA+PROBE: NORMAL
SPECIMEN SOURCE: NORMAL

## 2020-11-23 LAB
LABORATORY COMMENT REPORT: NORMAL
SARS-COV-2 RNA SPEC QL NAA+PROBE: NEGATIVE
SPECIMEN SOURCE: NORMAL

## 2020-11-24 DIAGNOSIS — Z11.59 ENCOUNTER FOR SCREENING FOR OTHER VIRAL DISEASES: Primary | ICD-10-CM

## 2020-11-25 ENCOUNTER — HOSPITAL ENCOUNTER (OUTPATIENT)
Dept: ULTRASOUND IMAGING | Facility: CLINIC | Age: 80
Discharge: HOME | End: 2020-11-25
Attending: INTERNAL MEDICINE | Admitting: INTERNAL MEDICINE
Payer: MEDICARE

## 2020-11-25 VITALS
RESPIRATION RATE: 16 BRPM | HEART RATE: 64 BPM | DIASTOLIC BLOOD PRESSURE: 48 MMHG | SYSTOLIC BLOOD PRESSURE: 108 MMHG | OXYGEN SATURATION: 100 %

## 2020-11-25 DIAGNOSIS — R18.8 OTHER ASCITES: ICD-10-CM

## 2020-11-25 LAB — PLATELET # BLD AUTO: 55 10E9/L (ref 150–450)

## 2020-11-25 PROCEDURE — 250N000009 HC RX 250: Performed by: RADIOLOGY

## 2020-11-25 PROCEDURE — 272N000021 US PARACENTESIS

## 2020-11-25 PROCEDURE — P9047 ALBUMIN (HUMAN), 25%, 50ML: HCPCS

## 2020-11-25 PROCEDURE — 250N000011 HC RX IP 250 OP 636

## 2020-11-25 PROCEDURE — 85049 AUTOMATED PLATELET COUNT: CPT | Performed by: RADIOLOGY

## 2020-11-25 RX ORDER — ALBUMIN (HUMAN) 12.5 G/50ML
25 SOLUTION INTRAVENOUS ONCE
Status: COMPLETED | OUTPATIENT
Start: 2020-11-25 | End: 2020-11-25

## 2020-11-25 RX ORDER — ALBUMIN (HUMAN) 12.5 G/50ML
SOLUTION INTRAVENOUS
Status: COMPLETED
Start: 2020-11-25 | End: 2020-11-25

## 2020-11-25 RX ADMIN — ALBUMIN (HUMAN) 25 G: 12.5 SOLUTION INTRAVENOUS at 10:21

## 2020-11-25 RX ADMIN — LIDOCAINE HYDROCHLORIDE 10 ML: 10 INJECTION, SOLUTION EPIDURAL; INFILTRATION; INTRACAUDAL; PERINEURAL at 09:59

## 2020-11-25 RX ADMIN — ALBUMIN HUMAN 25 G: 0.25 SOLUTION INTRAVENOUS at 10:21

## 2020-11-25 NOTE — PROCEDURES
[facility]    Procedure: Paracentesis.     Date/Time: 11/25/2020 10:04 AM  Performed by: Rosenda Moseley DO  Authorized by: Rosenda Moseley DO     UNIVERSAL PROTOCOL   Site Marked: Yes  Prior Images Obtained and Reviewed:  Yes  Required items: Required blood products, implants, devices and special equipment available    Patient identity confirmed:  Verbally with patient, arm band, provided demographic data and hospital-assigned identification number  Patient was reevaluated immediately before administering moderate or deep sedation or anesthesia  Confirmation Checklist:  Patient's identity using two indicators, relevant allergies, procedure was appropriate and matched the consent or emergent situation and correct equipment/implants were available  Time out: Immediately prior to the procedure a time out was called    Universal Protocol: the Joint Commission Universal Protocol was followed    Preparation: Patient was prepped and draped in usual sterile fashion           ANESTHESIA    Anesthesia: Local infiltration  Local Anesthetic:  Lidocaine 1% without epinephrine      SEDATION    Patient Sedated: No    See dictated procedure note for full details.  Findings: Paracentesis.     Specimens: none    Complications: None    Condition: Stable    PROCEDURE   Patient Tolerance:  Patient tolerated the procedure well with no immediate complications    Length of time physician/provider present for 1:1 monitoring during sedation: 0

## 2020-11-25 NOTE — PROGRESS NOTES
Patient tolerated paracentesis well.  4300 mL of straw colored fluid drained done by Dr Moseley  Dressing clean dry and intact with no drainage.  25 albumin given. Patient states understanding discharge instructions, taking P.O and home per family.  Deandre Hanson, RN, BSN

## 2020-11-30 DIAGNOSIS — Z11.59 ENCOUNTER FOR SCREENING FOR OTHER VIRAL DISEASES: ICD-10-CM

## 2020-11-30 PROCEDURE — U0003 INFECTIOUS AGENT DETECTION BY NUCLEIC ACID (DNA OR RNA); SEVERE ACUTE RESPIRATORY SYNDROME CORONAVIRUS 2 (SARS-COV-2) (CORONAVIRUS DISEASE [COVID-19]), AMPLIFIED PROBE TECHNIQUE, MAKING USE OF HIGH THROUGHPUT TECHNOLOGIES AS DESCRIBED BY CMS-2020-01-R: HCPCS | Performed by: INTERNAL MEDICINE

## 2020-12-01 LAB
SARS-COV-2 RNA SPEC QL NAA+PROBE: NOT DETECTED
SPECIMEN SOURCE: NORMAL

## 2020-12-03 ENCOUNTER — HOSPITAL ENCOUNTER (OUTPATIENT)
Dept: ULTRASOUND IMAGING | Facility: CLINIC | Age: 80
Discharge: HOME | End: 2020-12-03
Attending: INTERNAL MEDICINE | Admitting: INTERNAL MEDICINE
Payer: MEDICARE

## 2020-12-03 VITALS — DIASTOLIC BLOOD PRESSURE: 60 MMHG | SYSTOLIC BLOOD PRESSURE: 127 MMHG

## 2020-12-03 DIAGNOSIS — R18.8 OTHER ASCITES: ICD-10-CM

## 2020-12-03 PROCEDURE — P9047 ALBUMIN (HUMAN), 25%, 50ML: HCPCS | Performed by: RADIOLOGY

## 2020-12-03 PROCEDURE — 272N000021 US PARACENTESIS

## 2020-12-03 PROCEDURE — 250N000009 HC RX 250: Performed by: RADIOLOGY

## 2020-12-03 PROCEDURE — 250N000011 HC RX IP 250 OP 636: Performed by: RADIOLOGY

## 2020-12-03 PROCEDURE — P9047 ALBUMIN (HUMAN), 25%, 50ML: HCPCS

## 2020-12-03 PROCEDURE — 250N000011 HC RX IP 250 OP 636

## 2020-12-03 RX ORDER — ALBUMIN (HUMAN) 12.5 G/50ML
SOLUTION INTRAVENOUS
Status: DISCONTINUED
Start: 2020-12-03 | End: 2020-12-04 | Stop reason: HOSPADM

## 2020-12-03 RX ORDER — ALBUMIN (HUMAN) 12.5 G/50ML
SOLUTION INTRAVENOUS
Status: COMPLETED
Start: 2020-12-03 | End: 2020-12-03

## 2020-12-03 RX ORDER — ALBUMIN (HUMAN) 12.5 G/50ML
25 SOLUTION INTRAVENOUS ONCE
Status: COMPLETED | OUTPATIENT
Start: 2020-12-03 | End: 2020-12-03

## 2020-12-03 RX ADMIN — ALBUMIN (HUMAN) 25 G: 12.5 SOLUTION INTRAVENOUS at 14:38

## 2020-12-03 RX ADMIN — LIDOCAINE HYDROCHLORIDE 10 ML: 10 INJECTION, SOLUTION EPIDURAL; INFILTRATION; INTRACAUDAL; PERINEURAL at 14:24

## 2020-12-03 RX ADMIN — ALBUMIN HUMAN 25 G: 0.25 SOLUTION INTRAVENOUS at 14:24

## 2020-12-03 RX ADMIN — ALBUMIN HUMAN 25 G: 0.25 SOLUTION INTRAVENOUS at 14:38

## 2020-12-03 NOTE — PROGRESS NOTES
Paracentesis completed by Dr. Yang for total 5300 ml's yellow fluid.  Pt tolerated well with vital signs stable.  IV started and 50 grams albumin given. Sterile dressing to site dry on discharged to home with son.  Son states understanding of post site care.

## 2020-12-03 NOTE — IR NOTE
ULTRASOUND GUIDED PARACENTESIS   12/3/2020 2:42 PM     HISTORY:  IV albumin 25% at 25 gram for every 3 liter you tap, not to exceed 75 gram; Other ascites    PROCEDURE:   Informed consent was obtained from the patient prior to the procedure with discussion including the possible risks of bleeding, infection and organ injury . Using 5 mL of 1% lidocaine for local anesthesia, sterile technique, and sonographic guidance with permanent image documentation, I placed an 8F paracentesis catheter into the peritoneal fluid collection. This was used to aspirate 5300 mL of yellow, serous fluid in vacuum bottles, and some of this was sent for any laboratory studies that had been ordered. There were no immediate complications.  Intravenous albumen replacement was performed according to protocol.    IMPRESSION:  Ultrasound guided paracentesis.

## 2020-12-07 DIAGNOSIS — Z11.59 ENCOUNTER FOR SCREENING FOR OTHER VIRAL DISEASES: ICD-10-CM

## 2020-12-07 PROCEDURE — U0003 INFECTIOUS AGENT DETECTION BY NUCLEIC ACID (DNA OR RNA); SEVERE ACUTE RESPIRATORY SYNDROME CORONAVIRUS 2 (SARS-COV-2) (CORONAVIRUS DISEASE [COVID-19]), AMPLIFIED PROBE TECHNIQUE, MAKING USE OF HIGH THROUGHPUT TECHNOLOGIES AS DESCRIBED BY CMS-2020-01-R: HCPCS | Performed by: INTERNAL MEDICINE

## 2020-12-08 LAB
LABORATORY COMMENT REPORT: NORMAL
SARS-COV-2 RNA SPEC QL NAA+PROBE: NEGATIVE
SARS-COV-2 RNA SPEC QL NAA+PROBE: NORMAL
SPECIMEN SOURCE: NORMAL
SPECIMEN SOURCE: NORMAL

## 2020-12-10 ENCOUNTER — HOSPITAL ENCOUNTER (OUTPATIENT)
Dept: ULTRASOUND IMAGING | Facility: CLINIC | Age: 80
Discharge: HOME | End: 2020-12-10
Attending: INTERNAL MEDICINE | Admitting: INTERNAL MEDICINE
Payer: MEDICARE

## 2020-12-10 VITALS
DIASTOLIC BLOOD PRESSURE: 46 MMHG | HEART RATE: 64 BPM | RESPIRATION RATE: 16 BRPM | SYSTOLIC BLOOD PRESSURE: 101 MMHG | OXYGEN SATURATION: 100 %

## 2020-12-10 DIAGNOSIS — K74.60 UNSPECIFIED CIRRHOSIS OF LIVER (H): ICD-10-CM

## 2020-12-10 DIAGNOSIS — K75.81 LIVER CIRRHOSIS SECONDARY TO NASH (H): ICD-10-CM

## 2020-12-10 DIAGNOSIS — K74.60 LIVER CIRRHOSIS SECONDARY TO NASH (H): ICD-10-CM

## 2020-12-10 PROCEDURE — P9047 ALBUMIN (HUMAN), 25%, 50ML: HCPCS

## 2020-12-10 PROCEDURE — 49083 ABD PARACENTESIS W/IMAGING: CPT

## 2020-12-10 PROCEDURE — 250N000011 HC RX IP 250 OP 636

## 2020-12-10 PROCEDURE — 250N000009 HC RX 250: Performed by: RADIOLOGY

## 2020-12-10 RX ORDER — DEXTROSE MONOHYDRATE 25 G/50ML
25-50 INJECTION, SOLUTION INTRAVENOUS
Status: CANCELLED | OUTPATIENT
Start: 2020-12-10

## 2020-12-10 RX ORDER — ALBUMIN (HUMAN) 12.5 G/50ML
25 SOLUTION INTRAVENOUS ONCE
Status: COMPLETED | OUTPATIENT
Start: 2020-12-10 | End: 2020-12-10

## 2020-12-10 RX ORDER — DEXTROSE MONOHYDRATE 25 G/50ML
25-50 INJECTION, SOLUTION INTRAVENOUS
Status: DISCONTINUED | OUTPATIENT
Start: 2020-12-10 | End: 2020-12-11 | Stop reason: HOSPADM

## 2020-12-10 RX ORDER — NICOTINE POLACRILEX 4 MG
15-30 LOZENGE BUCCAL
Status: CANCELLED | OUTPATIENT
Start: 2020-12-10

## 2020-12-10 RX ORDER — LIDOCAINE HYDROCHLORIDE 10 MG/ML
10 INJECTION, SOLUTION EPIDURAL; INFILTRATION; INTRACAUDAL; PERINEURAL ONCE
Status: COMPLETED | OUTPATIENT
Start: 2020-12-10 | End: 2020-12-10

## 2020-12-10 RX ORDER — NICOTINE POLACRILEX 4 MG
15-30 LOZENGE BUCCAL
Status: DISCONTINUED | OUTPATIENT
Start: 2020-12-10 | End: 2020-12-11 | Stop reason: HOSPADM

## 2020-12-10 RX ORDER — ALBUMIN (HUMAN) 12.5 G/50ML
SOLUTION INTRAVENOUS
Status: COMPLETED
Start: 2020-12-10 | End: 2020-12-10

## 2020-12-10 RX ADMIN — ALBUMIN (HUMAN) 25 G: 12.5 SOLUTION INTRAVENOUS at 14:22

## 2020-12-10 RX ADMIN — LIDOCAINE HYDROCHLORIDE 10 ML: 10 INJECTION, SOLUTION EPIDURAL; INFILTRATION; INTRACAUDAL; PERINEURAL at 14:21

## 2020-12-10 RX ADMIN — ALBUMIN HUMAN 25 G: 0.25 SOLUTION INTRAVENOUS at 14:22

## 2020-12-10 NOTE — PROGRESS NOTES
Paracentesis complete.  Consent obtained with Dr. Mittal.  Pt tolerated well, drained 5200mLs yellow colored fluid.  Site at RLQ.  Site covered with a bandaid.  Site CDI.  Albumin 25 grams given.  Sample not sent to lab.  VSS.  No complications noted.  Reviewed discharge instructions with pt.  Next week Tuesday the 15th, pt will be having tips procedure at [facility].  Pt ambulated on discharge home with son who was .

## 2020-12-10 NOTE — PROCEDURES
[facility]    Procedure: Paracentesis    Date/Time: 12/10/2020 3:01 PM  Performed by: Kayli Mittal MD  Authorized by: Kayli Mittal MD     UNIVERSAL PROTOCOL   Site Marked: Yes  Prior Images Obtained and Reviewed:  Yes  Required items: Required blood products, implants, devices and special equipment available    Patient identity confirmed:  Verbally with patient  NA - No sedation, light sedation, or local anesthesia  Confirmation Checklist:  Patient's identity using two indicators, relevant allergies, procedure was appropriate and matched the consent or emergent situation and correct equipment/implants were available  Time out: Immediately prior to the procedure a time out was called    Universal Protocol: the Joint Commission Universal Protocol was followed    Preparation: Patient was prepped and draped in usual sterile fashion        See dictated procedure note for full details.  PROCEDURE   Patient Tolerance:  Patient tolerated the procedure well with no immediate complications  Describe Procedure: Para completed  Length of time physician/provider present for 1:1 monitoring during sedation: 0

## 2020-12-21 DIAGNOSIS — Z11.59 ENCOUNTER FOR SCREENING FOR OTHER VIRAL DISEASES: ICD-10-CM

## 2020-12-21 LAB
SARS-COV-2 RNA SPEC QL NAA+PROBE: NORMAL
SPECIMEN SOURCE: NORMAL

## 2020-12-21 PROCEDURE — U0003 INFECTIOUS AGENT DETECTION BY NUCLEIC ACID (DNA OR RNA); SEVERE ACUTE RESPIRATORY SYNDROME CORONAVIRUS 2 (SARS-COV-2) (CORONAVIRUS DISEASE [COVID-19]), AMPLIFIED PROBE TECHNIQUE, MAKING USE OF HIGH THROUGHPUT TECHNOLOGIES AS DESCRIBED BY CMS-2020-01-R: HCPCS | Performed by: INTERNAL MEDICINE

## 2020-12-24 ENCOUNTER — HOSPITAL ENCOUNTER (OUTPATIENT)
Dept: ULTRASOUND IMAGING | Facility: CLINIC | Age: 80
Discharge: HOME | End: 2020-12-24
Attending: INTERNAL MEDICINE | Admitting: INTERNAL MEDICINE
Payer: MEDICARE

## 2020-12-24 VITALS — SYSTOLIC BLOOD PRESSURE: 89 MMHG | DIASTOLIC BLOOD PRESSURE: 59 MMHG

## 2020-12-24 DIAGNOSIS — K75.81 LIVER CIRRHOSIS SECONDARY TO NASH (H): ICD-10-CM

## 2020-12-24 DIAGNOSIS — K74.60 LIVER CIRRHOSIS SECONDARY TO NASH (H): ICD-10-CM

## 2020-12-24 DIAGNOSIS — K74.60 UNSPECIFIED CIRRHOSIS OF LIVER (H): ICD-10-CM

## 2020-12-24 PROCEDURE — 250N000009 HC RX 250: Performed by: RADIOLOGY

## 2020-12-24 PROCEDURE — 49083 ABD PARACENTESIS W/IMAGING: CPT

## 2020-12-24 RX ADMIN — LIDOCAINE HYDROCHLORIDE 10 ML: 10 INJECTION, SOLUTION EPIDURAL; INFILTRATION; INTRACAUDAL; PERINEURAL at 13:15

## 2020-12-24 NOTE — PROCEDURES
[facility]    Procedure: Paracentesis    Date/Time: 12/24/2020 2:37 PM  Performed by: Rosenda Moseley DO  Authorized by: Rosenda Moseley DO     UNIVERSAL PROTOCOL   Site Marked: Yes  Prior Images Obtained and Reviewed:  Yes  Required items: Required blood products, implants, devices and special equipment available    Patient identity confirmed:  Verbally with patient, arm band, provided demographic data and hospital-assigned identification number  Patient was reevaluated immediately before administering moderate or deep sedation or anesthesia  Confirmation Checklist:  Patient's identity using two indicators, relevant allergies, procedure was appropriate and matched the consent or emergent situation and correct equipment/implants were available  Time out: Immediately prior to the procedure a time out was called    Universal Protocol: the Joint Commission Universal Protocol was followed    Preparation: Patient was prepped and draped in usual sterile fashion           ANESTHESIA    Anesthesia: Local infiltration  Local Anesthetic:  Lidocaine 1% without epinephrine      SEDATION    Patient Sedated: No    See dictated procedure note for full details.  Findings: Paracentesis.     Specimens: none    Complications: None    Condition: Stable    PROCEDURE   Patient Tolerance:  Patient tolerated the procedure well with no immediate complications    Length of time physician/provider present for 1:1 monitoring during sedation: 0

## 2020-12-24 NOTE — PROGRESS NOTES
Consent for paracentesis obtained by Dr. Moseley.  Pt tolerated procedure well for total 1400 ml's yellow fluid.  Sterile dressing to site.  Vital sign documented.  Son states understanding of post care.  Discharged ambulatory to home with son and no evident complications.  Son aware BP low but is his normal range.

## 2020-12-27 ENCOUNTER — HEALTH MAINTENANCE LETTER (OUTPATIENT)
Age: 80
End: 2020-12-27

## 2021-01-07 ENCOUNTER — HOSPITAL ENCOUNTER (OUTPATIENT)
Dept: ULTRASOUND IMAGING | Facility: CLINIC | Age: 81
Discharge: HOME | End: 2021-01-07
Attending: INTERNAL MEDICINE | Admitting: INTERNAL MEDICINE
Payer: MEDICARE

## 2021-01-07 VITALS
OXYGEN SATURATION: 100 % | RESPIRATION RATE: 16 BRPM | HEART RATE: 82 BPM | SYSTOLIC BLOOD PRESSURE: 117 MMHG | DIASTOLIC BLOOD PRESSURE: 55 MMHG

## 2021-01-07 DIAGNOSIS — K74.60 UNSPECIFIED CIRRHOSIS OF LIVER (H): ICD-10-CM

## 2021-01-07 DIAGNOSIS — K74.60 LIVER CIRRHOSIS SECONDARY TO NASH (H): ICD-10-CM

## 2021-01-07 DIAGNOSIS — K75.81 LIVER CIRRHOSIS SECONDARY TO NASH (H): ICD-10-CM

## 2021-01-07 PROCEDURE — 250N000009 HC RX 250: Performed by: RADIOLOGY

## 2021-01-07 PROCEDURE — 49083 ABD PARACENTESIS W/IMAGING: CPT

## 2021-01-07 RX ORDER — LIDOCAINE HYDROCHLORIDE 10 MG/ML
10 INJECTION, SOLUTION EPIDURAL; INFILTRATION; INTRACAUDAL; PERINEURAL ONCE
Status: COMPLETED | OUTPATIENT
Start: 2021-01-07 | End: 2021-01-07

## 2021-01-07 RX ADMIN — LIDOCAINE HYDROCHLORIDE 10 ML: 10 INJECTION, SOLUTION EPIDURAL; INFILTRATION; INTRACAUDAL; PERINEURAL at 14:09

## 2021-01-07 NOTE — PROGRESS NOTES
Paracentesis complete.  Consent obtained with Dr. Yang.  Pt tolerated well, drained 2300mLs yellow colored fluid.  Site at midline.  Site covered with a bandaid.  Site CDI.  Albumin not given.  Sample not sent to lab.  VSS.  No complications noted.  Reviewed discharge instructions with pt.  Pt ambulated on discharge home with son.

## 2021-01-07 NOTE — IR NOTE
ULTRASOUND GUIDED PARACENTESIS   1/7/2021 2:36 PM     HISTORY:  Ascites.    PROCEDURE:   Informed consent was obtained from the patient prior to the procedure with discussion including the possible risks of bleeding, infection and organ injury . Using 5 mL of 1% lidocaine for local anesthesia, sterile technique, and sonographic guidance with permanent image documentation, I placed an 8F paracentesis catheter into the peritoneal fluid collection. This was used to aspirate 2300 mL of yellow, serous fluid in vacuum bottles, and some of this was sent for any laboratory studies that had been ordered. There were no immediate complications.  Intravenous albumen replacement was performed according to protocol.    IMPRESSION:  Ultrasound guided paracentesis.    LEV JESSICA MD

## 2021-01-14 ENCOUNTER — HOSPITAL ENCOUNTER (OUTPATIENT)
Dept: ULTRASOUND IMAGING | Facility: CLINIC | Age: 81
Discharge: HOME | End: 2021-01-14
Attending: INTERNAL MEDICINE | Admitting: INTERNAL MEDICINE
Payer: MEDICARE

## 2021-01-14 VITALS
DIASTOLIC BLOOD PRESSURE: 53 MMHG | OXYGEN SATURATION: 100 % | HEART RATE: 76 BPM | SYSTOLIC BLOOD PRESSURE: 112 MMHG | RESPIRATION RATE: 18 BRPM

## 2021-01-14 DIAGNOSIS — Z11.59 ENCOUNTER FOR SCREENING FOR OTHER VIRAL DISEASES: Primary | ICD-10-CM

## 2021-01-14 DIAGNOSIS — K74.60 UNSPECIFIED CIRRHOSIS OF LIVER (H): ICD-10-CM

## 2021-01-14 DIAGNOSIS — K74.60 LIVER CIRRHOSIS SECONDARY TO NASH (H): ICD-10-CM

## 2021-01-14 DIAGNOSIS — K75.81 LIVER CIRRHOSIS SECONDARY TO NASH (H): ICD-10-CM

## 2021-01-14 PROCEDURE — 49083 ABD PARACENTESIS W/IMAGING: CPT

## 2021-01-14 PROCEDURE — 250N000009 HC RX 250: Performed by: RADIOLOGY

## 2021-01-14 RX ADMIN — LIDOCAINE HYDROCHLORIDE 10 ML: 10 INJECTION, SOLUTION EPIDURAL; INFILTRATION; INTRACAUDAL; PERINEURAL at 14:34

## 2021-01-14 NOTE — PROGRESS NOTES
Patient tolerated paracentesis well.  2600 mL of demetrius fluid drained done by Dr Warner  Dressing clean dry and intact with no drainage.  NO albumin given. Patient states understanding discharge instructions, taking P.O and home per son.  Deandre Hanson, RN, BSN

## 2021-01-15 ENCOUNTER — APPOINTMENT (OUTPATIENT)
Dept: CT IMAGING | Facility: CLINIC | Age: 81
DRG: 372 | End: 2021-01-15
Attending: EMERGENCY MEDICINE
Payer: MEDICARE

## 2021-01-15 ENCOUNTER — APPOINTMENT (OUTPATIENT)
Dept: GENERAL RADIOLOGY | Facility: CLINIC | Age: 81
DRG: 372 | End: 2021-01-15
Attending: EMERGENCY MEDICINE
Payer: MEDICARE

## 2021-01-15 ENCOUNTER — APPOINTMENT (OUTPATIENT)
Dept: ULTRASOUND IMAGING | Facility: CLINIC | Age: 81
DRG: 372 | End: 2021-01-15
Attending: INTERNAL MEDICINE
Payer: MEDICARE

## 2021-01-15 ENCOUNTER — HOSPITAL ENCOUNTER (INPATIENT)
Facility: CLINIC | Age: 81
LOS: 4 days | Discharge: HOME | DRG: 372 | End: 2021-01-19
Attending: EMERGENCY MEDICINE | Admitting: INTERNAL MEDICINE
Payer: MEDICARE

## 2021-01-15 DIAGNOSIS — K74.60 CIRRHOSIS OF LIVER WITH ASCITES, UNSPECIFIED HEPATIC CIRRHOSIS TYPE (H): ICD-10-CM

## 2021-01-15 DIAGNOSIS — K65.2 SBP (SPONTANEOUS BACTERIAL PERITONITIS) (H): Primary | ICD-10-CM

## 2021-01-15 DIAGNOSIS — R50.9 FEVER IN ADULT: ICD-10-CM

## 2021-01-15 DIAGNOSIS — R18.8 CIRRHOSIS OF LIVER WITH ASCITES, UNSPECIFIED HEPATIC CIRRHOSIS TYPE (H): ICD-10-CM

## 2021-01-15 DIAGNOSIS — E87.20 LACTIC ACIDOSIS: ICD-10-CM

## 2021-01-15 DIAGNOSIS — R06.02 SHORTNESS OF BREATH: ICD-10-CM

## 2021-01-15 LAB
ALBUMIN SERPL-MCNC: 1.8 G/DL (ref 3.4–5)
ALBUMIN UR-MCNC: NEGATIVE MG/DL
ALP SERPL-CCNC: 431 U/L (ref 40–150)
ALT SERPL W P-5'-P-CCNC: 26 U/L (ref 0–70)
ANION GAP SERPL CALCULATED.3IONS-SCNC: 3 MMOL/L (ref 3–14)
APPEARANCE FLD: NORMAL
APPEARANCE UR: CLEAR
AST SERPL W P-5'-P-CCNC: 55 U/L (ref 0–45)
BASE DEFICIT BLDV-SCNC: 3.1 MMOL/L
BASOPHILS # BLD AUTO: 0 10E9/L (ref 0–0.2)
BASOPHILS NFR BLD AUTO: 0.3 %
BILIRUB SERPL-MCNC: 3.2 MG/DL (ref 0.2–1.3)
BILIRUB UR QL STRIP: NEGATIVE
BUN SERPL-MCNC: 7 MG/DL (ref 7–30)
CALCIUM SERPL-MCNC: 7 MG/DL (ref 8.5–10.1)
CHLORIDE SERPL-SCNC: 110 MMOL/L (ref 94–109)
CK SERPL-CCNC: 29 U/L (ref 30–300)
CO2 SERPL-SCNC: 25 MMOL/L (ref 20–32)
COLOR FLD: YELLOW
COLOR UR AUTO: YELLOW
CREAT SERPL-MCNC: 0.7 MG/DL (ref 0.66–1.25)
DIFFERENTIAL METHOD BLD: ABNORMAL
EOSINOPHIL # BLD AUTO: 0.2 10E9/L (ref 0–0.7)
EOSINOPHIL NFR BLD AUTO: 3.2 %
EOSINOPHIL NFR FLD MANUAL: 2 %
ERYTHROCYTE [DISTWIDTH] IN BLOOD BY AUTOMATED COUNT: 16 % (ref 10–15)
FLUAV RNA RESP QL NAA+PROBE: NEGATIVE
FLUBV RNA RESP QL NAA+PROBE: NEGATIVE
GFR SERPL CREATININE-BSD FRML MDRD: >90 ML/MIN/{1.73_M2}
GLUCOSE SERPL-MCNC: 124 MG/DL (ref 70–99)
GLUCOSE UR STRIP-MCNC: NEGATIVE MG/DL
GRAM STN SPEC: NORMAL
HCO3 BLDV-SCNC: 22 MMOL/L (ref 21–28)
HCT VFR BLD AUTO: 31.2 % (ref 40–53)
HGB BLD-MCNC: 10.2 G/DL (ref 13.3–17.7)
HGB UR QL STRIP: NEGATIVE
HYALINE CASTS #/AREA URNS LPF: 22 /LPF (ref 0–2)
IMM GRANULOCYTES # BLD: 0 10E9/L (ref 0–0.4)
IMM GRANULOCYTES NFR BLD: 0.3 %
INR PPP: 2.07 (ref 0.86–1.14)
INTERPRETATION ECG - MUSE: NORMAL
KETONES UR STRIP-MCNC: NEGATIVE MG/DL
LABORATORY COMMENT REPORT: NORMAL
LACTATE BLD-SCNC: 1.8 MMOL/L (ref 0.7–2)
LACTATE BLD-SCNC: 2.8 MMOL/L (ref 0.7–2)
LEUKOCYTE ESTERASE UR QL STRIP: NEGATIVE
LYMPHOCYTES # BLD AUTO: 0.3 10E9/L (ref 0.8–5.3)
LYMPHOCYTES NFR BLD AUTO: 5.3 %
LYMPHOCYTES NFR FLD MANUAL: 3 %
MCH RBC QN AUTO: 30.3 PG (ref 26.5–33)
MCHC RBC AUTO-ENTMCNC: 32.7 G/DL (ref 31.5–36.5)
MCV RBC AUTO: 93 FL (ref 78–100)
MONOCYTES # BLD AUTO: 0.5 10E9/L (ref 0–1.3)
MONOCYTES NFR BLD AUTO: 7.3 %
MONOS+MACROS NFR FLD MANUAL: 16 %
MUCOUS THREADS #/AREA URNS LPF: PRESENT /LPF
NEUTROPHILS # BLD AUTO: 5.2 10E9/L (ref 1.6–8.3)
NEUTROPHILS NFR BLD AUTO: 83.6 %
NEUTS BAND NFR FLD MANUAL: 79 %
NITRATE UR QL: NEGATIVE
NRBC # BLD AUTO: 0 10*3/UL
NRBC BLD AUTO-RTO: 0 /100
NT-PROBNP SERPL-MCNC: 779 PG/ML (ref 0–1800)
O2/TOTAL GAS SETTING VFR VENT: ABNORMAL %
OXYHGB MFR BLDV: 18 %
PCO2 BLDV: 40 MM HG (ref 40–50)
PH BLDV: 7.36 PH (ref 7.32–7.43)
PH UR STRIP: 6 PH (ref 5–7)
PLATELET # BLD AUTO: 108 10E9/L (ref 150–450)
PO2 BLDV: 16 MM HG (ref 25–47)
POTASSIUM SERPL-SCNC: 3.2 MMOL/L (ref 3.4–5.3)
POTASSIUM SERPL-SCNC: 3.6 MMOL/L (ref 3.4–5.3)
PROT SERPL-MCNC: 6.1 G/DL (ref 6.8–8.8)
RBC # BLD AUTO: 3.37 10E12/L (ref 4.4–5.9)
RBC #/AREA URNS AUTO: 7 /HPF (ref 0–2)
RSV RNA SPEC QL NAA+PROBE: NORMAL
SARS-COV-2 RNA RESP QL NAA+PROBE: NEGATIVE
SODIUM SERPL-SCNC: 138 MMOL/L (ref 133–144)
SOURCE: ABNORMAL
SP GR UR STRIP: 1.01 (ref 1–1.03)
SPECIMEN SOURCE FLD: NORMAL
SPECIMEN SOURCE: NORMAL
SPECIMEN SOURCE: NORMAL
TROPONIN I SERPL-MCNC: 0.02 UG/L (ref 0–0.04)
UROBILINOGEN UR STRIP-MCNC: 2 MG/DL (ref 0–2)
WBC # BLD AUTO: 6.2 10E9/L (ref 4–11)
WBC # FLD AUTO: 5290 /UL
WBC #/AREA URNS AUTO: 3 /HPF (ref 0–5)

## 2021-01-15 PROCEDURE — 250N000011 HC RX IP 250 OP 636: Performed by: EMERGENCY MEDICINE

## 2021-01-15 PROCEDURE — 250N000009 HC RX 250: Performed by: EMERGENCY MEDICINE

## 2021-01-15 PROCEDURE — 87070 CULTURE OTHR SPECIMN AEROBIC: CPT | Performed by: EMERGENCY MEDICINE

## 2021-01-15 PROCEDURE — 96367 TX/PROPH/DG ADDL SEQ IV INF: CPT

## 2021-01-15 PROCEDURE — 89051 BODY FLUID CELL COUNT: CPT | Performed by: EMERGENCY MEDICINE

## 2021-01-15 PROCEDURE — 258N000003 HC RX IP 258 OP 636: Performed by: INTERNAL MEDICINE

## 2021-01-15 PROCEDURE — 99223 1ST HOSP IP/OBS HIGH 75: CPT | Mod: AI | Performed by: INTERNAL MEDICINE

## 2021-01-15 PROCEDURE — 71045 X-RAY EXAM CHEST 1 VIEW: CPT

## 2021-01-15 PROCEDURE — 83880 ASSAY OF NATRIURETIC PEPTIDE: CPT | Performed by: EMERGENCY MEDICINE

## 2021-01-15 PROCEDURE — 84132 ASSAY OF SERUM POTASSIUM: CPT | Performed by: INTERNAL MEDICINE

## 2021-01-15 PROCEDURE — 96366 THER/PROPH/DIAG IV INF ADDON: CPT

## 2021-01-15 PROCEDURE — 120N000001 HC R&B MED SURG/OB

## 2021-01-15 PROCEDURE — 80053 COMPREHEN METABOLIC PANEL: CPT | Performed by: EMERGENCY MEDICINE

## 2021-01-15 PROCEDURE — 93005 ELECTROCARDIOGRAM TRACING: CPT

## 2021-01-15 PROCEDURE — 250N000011 HC RX IP 250 OP 636: Performed by: INTERNAL MEDICINE

## 2021-01-15 PROCEDURE — 250N000013 HC RX MED GY IP 250 OP 250 PS 637: Performed by: INTERNAL MEDICINE

## 2021-01-15 PROCEDURE — 87040 BLOOD CULTURE FOR BACTERIA: CPT | Performed by: EMERGENCY MEDICINE

## 2021-01-15 PROCEDURE — 82550 ASSAY OF CK (CPK): CPT | Performed by: EMERGENCY MEDICINE

## 2021-01-15 PROCEDURE — 258N000003 HC RX IP 258 OP 636: Performed by: EMERGENCY MEDICINE

## 2021-01-15 PROCEDURE — C9803 HOPD COVID-19 SPEC COLLECT: HCPCS

## 2021-01-15 PROCEDURE — 99207 PR APP CREDIT; MD BILLING SHARED VISIT: CPT | Performed by: INTERNAL MEDICINE

## 2021-01-15 PROCEDURE — 71275 CT ANGIOGRAPHY CHEST: CPT

## 2021-01-15 PROCEDURE — 0W9G3ZX DRAINAGE OF PERITONEAL CAVITY, PERCUTANEOUS APPROACH, DIAGNOSTIC: ICD-10-PCS | Performed by: EMERGENCY MEDICINE

## 2021-01-15 PROCEDURE — 93975 VASCULAR STUDY: CPT

## 2021-01-15 PROCEDURE — 84484 ASSAY OF TROPONIN QUANT: CPT | Performed by: EMERGENCY MEDICINE

## 2021-01-15 PROCEDURE — P9047 ALBUMIN (HUMAN), 25%, 50ML: HCPCS | Performed by: INTERNAL MEDICINE

## 2021-01-15 PROCEDURE — 81001 URINALYSIS AUTO W/SCOPE: CPT | Performed by: EMERGENCY MEDICINE

## 2021-01-15 PROCEDURE — 87205 SMEAR GRAM STAIN: CPT | Performed by: EMERGENCY MEDICINE

## 2021-01-15 PROCEDURE — 87075 CULTR BACTERIA EXCEPT BLOOD: CPT | Performed by: EMERGENCY MEDICINE

## 2021-01-15 PROCEDURE — 85025 COMPLETE CBC W/AUTO DIFF WBC: CPT | Performed by: EMERGENCY MEDICINE

## 2021-01-15 PROCEDURE — 36415 COLL VENOUS BLD VENIPUNCTURE: CPT | Performed by: INTERNAL MEDICINE

## 2021-01-15 PROCEDURE — 82805 BLOOD GASES W/O2 SATURATION: CPT | Performed by: EMERGENCY MEDICINE

## 2021-01-15 PROCEDURE — 83605 ASSAY OF LACTIC ACID: CPT | Performed by: EMERGENCY MEDICINE

## 2021-01-15 PROCEDURE — 83605 ASSAY OF LACTIC ACID: CPT | Performed by: INTERNAL MEDICINE

## 2021-01-15 PROCEDURE — 99285 EMERGENCY DEPT VISIT HI MDM: CPT | Mod: 25

## 2021-01-15 PROCEDURE — 87636 SARSCOV2 & INF A&B AMP PRB: CPT | Performed by: EMERGENCY MEDICINE

## 2021-01-15 PROCEDURE — 76705 ECHO EXAM OF ABDOMEN: CPT

## 2021-01-15 PROCEDURE — 85610 PROTHROMBIN TIME: CPT | Performed by: EMERGENCY MEDICINE

## 2021-01-15 PROCEDURE — 96365 THER/PROPH/DIAG IV INF INIT: CPT | Mod: 59

## 2021-01-15 RX ORDER — LIDOCAINE 40 MG/G
CREAM TOPICAL
Status: DISCONTINUED | OUTPATIENT
Start: 2021-01-15 | End: 2021-01-19 | Stop reason: HOSPADM

## 2021-01-15 RX ORDER — PROCHLORPERAZINE MALEATE 5 MG
5 TABLET ORAL EVERY 6 HOURS PRN
Status: DISCONTINUED | OUTPATIENT
Start: 2021-01-15 | End: 2021-01-19 | Stop reason: HOSPADM

## 2021-01-15 RX ORDER — POTASSIUM CHLORIDE 1500 MG/1
20 TABLET, EXTENDED RELEASE ORAL ONCE
Status: COMPLETED | OUTPATIENT
Start: 2021-01-15 | End: 2021-01-15

## 2021-01-15 RX ORDER — IOPAMIDOL 755 MG/ML
500 INJECTION, SOLUTION INTRAVASCULAR ONCE
Status: COMPLETED | OUTPATIENT
Start: 2021-01-15 | End: 2021-01-15

## 2021-01-15 RX ORDER — LIDOCAINE HYDROCHLORIDE AND EPINEPHRINE 10; 10 MG/ML; UG/ML
INJECTION, SOLUTION INFILTRATION; PERINEURAL
Status: DISCONTINUED
Start: 2021-01-15 | End: 2021-01-15 | Stop reason: HOSPADM

## 2021-01-15 RX ORDER — CEFEPIME HYDROCHLORIDE 1 G/1
1 INJECTION, POWDER, FOR SOLUTION INTRAMUSCULAR; INTRAVENOUS ONCE
Status: COMPLETED | OUTPATIENT
Start: 2021-01-15 | End: 2021-01-15

## 2021-01-15 RX ORDER — LACTULOSE 10 G/15ML
20 SOLUTION ORAL 2 TIMES DAILY
Status: ON HOLD | COMMUNITY
Start: 2020-12-22 | End: 2021-04-25

## 2021-01-15 RX ORDER — PROCHLORPERAZINE 25 MG
12.5 SUPPOSITORY, RECTAL RECTAL EVERY 12 HOURS PRN
Status: DISCONTINUED | OUTPATIENT
Start: 2021-01-15 | End: 2021-01-19 | Stop reason: HOSPADM

## 2021-01-15 RX ORDER — ALBUMIN (HUMAN) 12.5 G/50ML
75 SOLUTION INTRAVENOUS ONCE
Status: COMPLETED | OUTPATIENT
Start: 2021-01-15 | End: 2021-01-15

## 2021-01-15 RX ORDER — ONDANSETRON 4 MG/1
4 TABLET, ORALLY DISINTEGRATING ORAL EVERY 6 HOURS PRN
Status: DISCONTINUED | OUTPATIENT
Start: 2021-01-15 | End: 2021-01-19 | Stop reason: HOSPADM

## 2021-01-15 RX ORDER — LACTULOSE 10 G/15ML
20 SOLUTION ORAL 2 TIMES DAILY
Status: DISCONTINUED | OUTPATIENT
Start: 2021-01-15 | End: 2021-01-19 | Stop reason: HOSPADM

## 2021-01-15 RX ORDER — CEFTRIAXONE 2 G/1
2 INJECTION, POWDER, FOR SOLUTION INTRAMUSCULAR; INTRAVENOUS EVERY 24 HOURS
Status: DISCONTINUED | OUTPATIENT
Start: 2021-01-15 | End: 2021-01-15

## 2021-01-15 RX ORDER — CEFTRIAXONE 2 G/1
2 INJECTION, POWDER, FOR SOLUTION INTRAMUSCULAR; INTRAVENOUS EVERY 24 HOURS
Status: DISCONTINUED | OUTPATIENT
Start: 2021-01-15 | End: 2021-01-19

## 2021-01-15 RX ORDER — FUROSEMIDE 20 MG
20 TABLET ORAL
COMMUNITY
Start: 2020-08-21

## 2021-01-15 RX ORDER — FERROUS SULFATE 325(65) MG
325 TABLET ORAL
COMMUNITY
End: 2021-04-08

## 2021-01-15 RX ORDER — ONDANSETRON 2 MG/ML
4 INJECTION INTRAMUSCULAR; INTRAVENOUS EVERY 6 HOURS PRN
Status: DISCONTINUED | OUTPATIENT
Start: 2021-01-15 | End: 2021-01-19 | Stop reason: HOSPADM

## 2021-01-15 RX ADMIN — IOPAMIDOL 100 ML: 755 INJECTION, SOLUTION INTRAVENOUS at 05:04

## 2021-01-15 RX ADMIN — SODIUM CHLORIDE 500 ML: 9 INJECTION, SOLUTION INTRAVENOUS at 09:27

## 2021-01-15 RX ADMIN — POTASSIUM CHLORIDE 20 MEQ: 1500 TABLET, EXTENDED RELEASE ORAL at 17:31

## 2021-01-15 RX ADMIN — POTASSIUM CHLORIDE 20 MEQ: 1500 TABLET, EXTENDED RELEASE ORAL at 10:57

## 2021-01-15 RX ADMIN — CEFTRIAXONE 2 G: 2 INJECTION, POWDER, FOR SOLUTION INTRAMUSCULAR; INTRAVENOUS at 16:12

## 2021-01-15 RX ADMIN — ALBUMIN HUMAN 75 G: 0.25 SOLUTION INTRAVENOUS at 10:08

## 2021-01-15 RX ADMIN — SODIUM CHLORIDE 65 ML: 9 INJECTION, SOLUTION INTRAVENOUS at 05:04

## 2021-01-15 RX ADMIN — LACTULOSE 20 G: 20 SOLUTION ORAL at 10:57

## 2021-01-15 RX ADMIN — CEFEPIME HYDROCHLORIDE 1 G: 1 INJECTION, POWDER, FOR SOLUTION INTRAMUSCULAR; INTRAVENOUS at 04:24

## 2021-01-15 RX ADMIN — LACTULOSE 20 G: 20 SOLUTION ORAL at 19:35

## 2021-01-15 RX ADMIN — VANCOMYCIN HYDROCHLORIDE 1250 MG: 5 INJECTION, POWDER, LYOPHILIZED, FOR SOLUTION INTRAVENOUS at 05:16

## 2021-01-15 RX ADMIN — SODIUM CHLORIDE 500 ML: 9 INJECTION, SOLUTION INTRAVENOUS at 06:11

## 2021-01-15 ASSESSMENT — ENCOUNTER SYMPTOMS
WEAKNESS: 1
DIARRHEA: 0
VOMITING: 0
SHORTNESS OF BREATH: 1
CHILLS: 1
NAUSEA: 1
ABDOMINAL PAIN: 0
FEVER: 1
COUGH: 0
CONFUSION: 0
COLOR CHANGE: 1

## 2021-01-15 ASSESSMENT — ACTIVITIES OF DAILY LIVING (ADL)
ADLS_ACUITY_SCORE: 15
ADLS_ACUITY_SCORE: 17
ADLS_ACUITY_SCORE: 17

## 2021-01-15 ASSESSMENT — MIFFLIN-ST. JEOR: SCORE: 1198

## 2021-01-15 NOTE — PHARMACY-ADMISSION MEDICATION HISTORY
Admission medication history interview status for this patient is complete. See [facility] admission navigator for allergy information, prior to admission medications and immunization status.     Medication history interview done via telephone during Covid-19 pandemic, indicate source(s): Family  Medication history resources (including written lists, pill bottles, clinic record):[email or web address]  Pharmacy: [facility]    Changes made to PTA medication list:  Added:  Iron  Deleted:  Nadolol, Maxzide  Changed: lasix( added frequency)    Actions taken by pharmacist (provider contacted, etc):sticky note for md     Additional medication history information:None    Medication reconciliation/reorder completed by provider prior to medication history?  Y   (Y/N)         Prior to Admission medications    Medication Sig Last Dose Taking? Auth Provider   ferrous sulfate (FEROSUL) 325 (65 Fe) MG tablet Take 325 mg by mouth daily (with breakfast) Past Week at Unknown time Yes Unknown, Entered By History   furosemide (LASIX) 20 MG tablet Take 20 mg by mouth 2 times daily  1/14/2021 at Unknown time Yes Reported, Patient   lactulose (CONSTULOSE) 10 GM/15ML solution TAKE 30ML BY ORAL ROUTE 2 TIMES EVERY DAY 1/14/2021 at Unknown time Yes Reported, Patient

## 2021-01-15 NOTE — ED NOTES
[facility]  ED Nurse Handoff Report    Robb Vallejo is a 80 year old male   ED Chief complaint: Shortness of Breath and Fever  . ED Diagnosis:   Final diagnoses:   Cirrhosis of liver with ascites, unspecified hepatic cirrhosis type (H)   Fever in adult   Lactic acidosis   Shortness of breath     Allergies:   Allergies   Allergen Reactions     Penicillins Other (See Comments)     Syncope         Code Status: Full Code  Activity level - Baseline/Home:  Assist X 2. Activity Level - Current:   Assist X 2. Lift room needed: No. Bariatric: No   Needed: yes   Isolation: No. Infection: Not Applicable.     Vital Signs:   Vitals:    01/15/21 0310 01/15/21 0400 01/15/21 0424 01/15/21 0600   BP:   115/54 115/54   Pulse:  92  86   Resp:       Temp: 100.8  F (38.2  C)      TempSrc: Oral      SpO2:  100% 95% 100%   Weight:           Cardiac Rhythm:  ,      Pain level:    Patient confused: No. Patient Falls Risk: Yes.   Elimination Status: Has voided   Patient Report - Initial Complaint: SOB, fever . Focused Assessment: patient presents to ED d/t reported fever, SOB. Son reports patient had paracentesis yesterday. Increased work of breathing.     Respiratory assessment: diminished lung sounds lower lobes.     Hx ascites, cirrhosis, hepato cellular carcinoma , hernia   Tests Performed: lactic, BVG, CBC, COVID, cell count with differential fluid, CT, UA  Abnormal Results:   CT Chest (PE) Abdomen Pelvis w Contrast   Final Result   IMPRESSION:   1.  Negative for pulmonary embolism.      2.  No evidence for pneumonitis.      3.  Small bilateral pleural effusions, right greater than left.      4.  Minimal pericardial effusion.      5.  There is some questionable ill-defined asymmetric soft tissue fullness incompletely visualized in the right lower neck. Clinical correlation in this area and follow-up as clinically warranted.      6.  Cirrhotic configuration of the liver with a patent TIPS shunt catheter in  the right hepatic lobe. However, there is some ill-defined heterogeneous enhancement as well as some indeterminate low-attenuation lesions in the right hepatic lobe. Patient    has a history of hepatocellular carcinoma per review of the chart. However, no recent comparison studies are available. Clinical correlation and correlation with any recent prior studies would be helpful.      7.  Evidence of portal venous hypertension with splenic enlargement, numerous varices in the abdomen, and a large amount of ascites throughout the abdomen and pelvis.      8.  There is some subtle diffuse wall thickening involving both small bowel and colon. This is favored to be more likely secondary to passive venous congestion in the setting of portal venous hypertension, however clinical correlation is recommended to    exclude any signs of enteritis or colitis which is felt to be less likely.      9.  Prostatic enlargement.      10.  Moderate-sized umbilical hernia contains ascites.         XR Chest Port 1 View   Final Result   IMPRESSION: Relatively low lung volumes. Mild interstitial prominence. Questionable small area of infiltrate of the lower right lung. Mild atelectasis or scarring at the left base. Normal heart size. Calcified plaque of the aortic arch. No pleural fluid    or pneumothorax.        Labs Ordered and Resulted from Time of ED Arrival Up to the Time of Departure from the ED   LACTIC ACID WHOLE BLOOD - Abnormal; Notable for the following components:       Result Value    Lactic Acid 2.8 (*)     All other components within normal limits   CBC WITH PLATELETS DIFFERENTIAL - Abnormal; Notable for the following components:    RBC Count 3.37 (*)     Hemoglobin 10.2 (*)     Hematocrit 31.2 (*)     RDW 16.0 (*)     Platelet Count 108 (*)     Absolute Lymphocytes 0.3 (*)     All other components within normal limits   COMPREHENSIVE METABOLIC PANEL - Abnormal; Notable for the following components:    Potassium 3.2 (*)      Chloride 110 (*)     Glucose 124 (*)     Calcium 7.0 (*)     Bilirubin Total 3.2 (*)     Albumin 1.8 (*)     Protein Total 6.1 (*)     Alkaline Phosphatase 431 (*)     AST 55 (*)     All other components within normal limits   BLOOD GAS VENOUS AND OXYHGB - Abnormal; Notable for the following components:    PO2 Venous 16 (*)     All other components within normal limits   ROUTINE UA WITH MICROSCOPIC - Abnormal; Notable for the following components:    RBC Urine 7 (*)     Mucous Urine Present (*)     Hyaline Casts 22 (*)     All other components within normal limits   CK TOTAL - Abnormal; Notable for the following components:    CK Total 29 (*)     All other components within normal limits   INR - Abnormal; Notable for the following components:    INR 2.07 (*)     All other components within normal limits   INFLUENZA A/B & SARS-COV2 PCR MULTIPLEX   NT PROBNP INPATIENT   TROPONIN I   BLOOD CULTURE   BLOOD CULTURE   CELL COUNT WITH DIFFERENTIAL FLUID   GRAM STAIN   FLUID CULTURE AEROBIC BACTERIAL     .   Treatments provided:   NS bolus  Vancomycin  Ekg,   Cefipeme  Family Comments: son at bedside- translates. Unable to get a hold of IPAD .  OBS brochure/video discussed/provided to patient:  N/A  ED Medications:   Medications   vancomycin 1250 mg in 0.9% NaCl 250 mL intermittent infusion 1,250 mg (1,250 mg Intravenous New Bag 1/15/21 0516)   lidocaine 1% with EPINEPHrine 1:100,000 1 %-1:129717 injection (has no administration in time range)   0.9% sodium chloride BOLUS (500 mLs Intravenous New Bag 1/15/21 0611)   CT scan flush (65 mLs Intravenous Given 1/15/21 0504)   iopamidol (ISOVUE-370) solution 500 mL (100 mLs Intravenous Given 1/15/21 0504)   ceFEPIme (MAXIPIME) 1g vial to attach to  ml bag for ADULTS or NS 50 ml bag for PEDS (0 g Intravenous Stopped 1/15/21 0454)     Drips infusing:  Yes  For the majority of the shift, the patient's behavior Green. Interventions performed were NA .    Sepsis  treatment initiated: No     Patient tested for COVID 19 prior to admission: YES    ED Nurse Name/Phone Number: Sidra Hassan RN,   6:13 AM    RECEIVING UNIT ED HANDOFF REVIEW    Above ED Nurse Handoff Report was reviewed: Yes  Reviewed by: Tasia Cazares RN on January 15, 2021 at 8:20 AM

## 2021-01-15 NOTE — ED PROVIDER NOTES
History   Chief Complaint:  Shortness of Breath      used: Patient's son.      Robb Vallejo is a 80 year old male with history of Alzheimer's, cirrhosis from [place], hepatocellular carcinoma, TIPS procedure, weekly paracentesis, and hypertension who presents with shortness of breath/fever. The patient lives with his son who acts as , and reports a couple days of generalized weakness. In addition, he states the patient woke up tonight with shortness of breath, fever, and chills around 0000. The patient is also endorsing some chest pressure, nausea, and redness in his lower abdomen since his paracentesis last Thursday. He denies any confusion, cough, vomiting, diarrhea, or new abdominal pain. It is noted that the patient gets a paracentesis weekly, and had one done yesterday. The patient is ambulatory with a cane.     12/23/20 US abdomen RUQ:  1. Patent TIPS with normal direction of flow. Good flow velocities are seen throughout.   2. Cirrhotic liver morphology.   3. Small volume ascites.   4. Cholelithiasis. Diffuse gallbladder wall thickening, also present on prior MRI, nonspecific in the setting of portal hypertension.    11/13/20 MR Abdomen Liver:  1.  No radiographic evidence of viable disease within the liver.    2.  Prominent abdominal and retroperitoneal lymph nodes, unchanged.   3.  Moderate enlargement of ascites with enhancement along the posterior   aspect of the peritoneum, possibly with some septations, stable for   greater than one year, perhaps related to previous hemorrhage or   inflammation.  No definite new nodularity to suggest malignancy, although   this cannot be entirely excluded based on the imaging.    4.  Gallbladder wall thickening without distention most likely due to   portal hypertension.     Review of Systems   Constitutional: Positive for chills and fever.   Respiratory: Positive for shortness of breath. Negative for cough.    Cardiovascular:  Positive for chest pain (pressure).   Gastrointestinal: Positive for nausea. Negative for abdominal pain, diarrhea and vomiting.   Skin: Positive for color change.   Neurological: Positive for weakness.   Psychiatric/Behavioral: Negative for confusion.   All other systems reviewed and are negative.      Allergies:  Penicillins  Propranolol     Medications:  Corgard  Maxzide    Past Medical History:    Cirrhosis  Dementia  Syncopal episodes  Hepatocellular carcinoma  Anemia  Frequent falls  Rectal carcinoid tumor   Thrombocytopenia  Esophageal varices   Alzheimer's   Hypertension  Hernia  Liver cancer    Past Surgical History:    Colonoscopy  Banding of esophageal varices  Cataract removal x2  Angio hepatic x4  Paracentesis  Tips procedure    Social History:  Patient presents with son at bedside.  Lives with son.    Physical Exam     Patient Vitals for the past 24 hrs:   BP Temp Temp src Pulse Resp SpO2 Weight   01/15/21 0600 115/54 -- -- 86 -- 100 % --   01/15/21 0424 115/54 -- -- -- -- 95 % --   01/15/21 0400 -- -- -- 92 -- 100 % --   01/15/21 0310 -- 100.8  F (38.2  C) Oral -- -- -- --   01/15/21 0300 (!) 147/62 -- -- 101 -- 100 % --   01/15/21 0250 -- -- -- -- -- -- 53.1 kg (117 lb)   01/15/21 0247 (!) 144/72 99.9  F (37.7  C) Oral 104 24 100 % --       Physical Exam  VS: Reviewed per above  HENT: Mucous membranes moist, no nuchal rigidity, no neck masses/rashes appreciated  EYES: sclera anicteric  CV: Rate as noted, regular rhythm.   RESP: Effort normal. Breath sounds are normal bilaterally.  GI: mild general tenderness without rebound/guarding, + distended.  NEURO: GCS 15, COTA equally  MSK: No deformity of the extremities  SKIN: Warm and dry, redness to mid lower abdomen.    Emergency Department Course     ECG:  Indication: Shortness of breath  Time: 0259  Vent. Rate 100 bpm. IA interval 160. QRS duration 76. QT/QTc 364/469. P-R-T axis 12 -15 52.   NSR, Low voltage QRS, Borderline ECG   No significant change  from 10/12/20  Read time: 0314    Imaging:    XR Chest Port 1 View:  Relatively low lung volumes. Mild interstitial prominence. Questionable small area of infiltrate of the lower right lung. Mild atelectasis or scarring at the left base. Normal heart size. Calcified plaque of the aortic arch. No pleural fluid   or pneumothorax, as per radiology.     CT Chest PE Abdomen Pelvis w/ IV contrast:   1.  Negative for pulmonary embolism.   2.  No evidence for pneumonitis.   3.  Small bilateral pleural effusions, right greater than left.   4.  Minimal pericardial effusion.   5.  There is some questionable ill-defined asymmetric soft tissue fullness incompletely visualized in the right lower neck. Clinical correlation in this area and follow-up as clinically warranted.   6.  Cirrhotic configuration of the liver with a patent TIPS shunt catheter in the right hepatic lobe. However, there is some ill-defined heterogeneous enhancement as well as some indeterminate low-attenuation lesions in the right hepatic lobe. Patient   has a history of hepatocellular carcinoma per review of the chart. However, no recent comparison studies are available. Clinical correlation and correlation with any recent prior studies would be helpful.   7.  Evidence of portal venous hypertension with splenic enlargement, numerous varices in the abdomen, and a large amount of ascites throughout the abdomen and pelvis.   8.  There is some subtle diffuse wall thickening involving both small bowel and colon. This is favored to be more likely secondary to passive venous congestion in the setting of portal venous hypertension, however clinical correlation is recommended to   exclude any signs of enteritis or colitis which is felt to be less likely.   9.  Prostatic enlargement.   10.  Moderate-sized umbilical hernia contains ascites, as per radiology.    Laboratory:    CBC: WBC: 6.2, HGB: 10.2 (L), PLT: 108 (L)  CMP: Glucose 124 (H), Potassium: 3.2 (L), Chloride:  110 (H), Calcium: 7.0 (L), Bilirubin Total: 3.2 (H), Albumin: 1.8 (L), Protein Total: 6.1 (L), Alkaline Phosphatase: 431 (H), AST: 55 (H), o/w WNL (Creatinine: 0.70)    Lactic acid (Resulted 0312): 2.8 (H)  Troponin (Collected 0303): 0.017    VBG and oxyhgb: PO2 16 (L), o/w WNL  BNP: 779  CK total: 29 (L)  INR: 2.07 (H)  Blood Cultures x2: Pending    Fluid culture: Pending  Gram stain: Negative  Cell count with differential: WBC fluid: 5,290    UA with microscopic: RBC 7(H), mucous present, hyaline casts 22 o/w WNL     Symptomatic Influenza A/B & COVID PCR: Negative    Procedures Paracentesis  PERFORMED BY: Dr. Leiva    INDICATION:  Dyspnea secondary to increased ascites fluid    CONSENT: Verbal and written consent was obtained from the patient prior to the procedure.      Risks and benefits of the procedure were explained and the patient is comfortable with this.        Guidance: Ultrasound was used to confirm a large fluid pocket in the patient's left lower abdomen.        This skin site was marked.  The overlying skin was cleaned using chlohexadine.      ANESTHESIA: Local anesthesia was obtained using 1% Lido w/ Epi.    NOTE:  Using sterile technique the site was prepped with above anesthetic.  Using a Luer-Barak syringe and 19-gauge needle I was able to enter the peritoneal space with positive removal 10cc of serous sanguinous yellow fluid.  There was no cloudiness or bleeding complications. Following removal of the fluid the skin was covered with a Band-Aid. Fluid was sent for laboratory testing.    COMPLICATIONS: Patient tolerated the procedure well with no immediate complications.      Emergency Department Course:    Reviewed:  I reviewed the patient's nursing notes, vitals, past medical records, Care Everywhere.     Assessments:  0254    I evaluated the patient and performed an exam as above.    0550    I updated the patient on results and discussed plan of care.    Consults:   0602    I spoke with   Fouzia of the Hospitalist service from [facility] regarding patient's presentation, findings, and plan of care.    Interventions:  0424 Maxipime, 1 g, IV  0516 Vancomycin, 1,250 mg, IV  0611   ml IV    Disposition:  The patient was admitted to the hospital under the care of Dr. Fragoso.     Impression & Plan     Covid-19  Robb Vallejo was evaluated during a global COVID-19 pandemic, which necessitated consideration that the patient might be at risk for infection with the SARS-CoV-2 virus that causes COVID-19.   Applicable protocols for evaluation were followed during the patient's care.   COVID-19 was considered as part of the patient's evaluation. The plan for testing is:  a test was obtained during this visit.    CMS Diagnoses: The Lactic acid level is elevated due to cirrhosis, at this time there is no sign of severe sepsis or septic shock.    Medical Decision Making:  Patient presents to the ER with his son for evaluation of 3 days of generalized weakness and recent onset of shortness of breath and fever.  On arrival temperature is 100.8  F.  Other vital signs are stable.  On exam patient is awake and alert.  No clinical findings of meningitis or skin or soft tissue infection. Abdomen is distended which is consistent with known cirrhosis ascites.  Lungs are clear to auscultation.    Initial lactic acid is elevated at 2.8 but he has no leukocytosis.  I suspect lactic acid elevation is due to underlying cirrhosis and poor clearance.  Given underlying comorbidities, broad-spectrum antibiotics were given however including vancomycin and cefepime.  CT of the chest and abdomen does not suggest obvious infectious process.  Paracentesis was performed by myself and SBP was still in the differential based on cell count.  Patient was given 500 cc of saline but further fluids were withheld due to underlying hypoalbuminemia and concern for further third spacing.  Inspected shortness of breath, there is no  evidence of VBG derangement, pneumonia, PE, heart failure, Covid19, ACS.  At signout to my colleague, patient was awaiting inpatient bed.    Diagnosis:    ICD-10-CM    1. Cirrhosis of liver with ascites, unspecified hepatic cirrhosis type (H)  K74.60 Routine UA with microscopic    R18.8 Blood culture     Cell count with differential fluid   2. Fever in adult  R50.9    3. Lactic acidosis  E87.2    4. Shortness of breath  R06.02        Discharge Medications:  New Prescriptions    No medications on file       Scribe Disclosure:  I, Sujata Peterson, am serving as a scribe at 2:53 AM on 1/15/2021 to document services personally performed by Jonathan Leiva MD based on my observations and the provider's statements to me.      Jonathan Leiva MD  01/15/21 0643

## 2021-01-15 NOTE — ED NOTES
Dr. Tan states that we do not need to recheck a lactic acid level at this time. MD aware of current VS.

## 2021-01-15 NOTE — CONSULTS
GASTROENTEROLOGY CONSULTATION     Robb Vallejo  [id number] [facility] [id number]  80 year old male    Admission Date/Time: 1/15/2021  Primary Care Provider: Amanda Aguero    We were asked to see the patient in consultation by Dr. Ulrich for evaluation of cirrhosis.        HPI:  Robb Vallejo is a 80 year old male with a past medical history significant for decompensated [place] cirrhosis with Portal hypertension/nonbleeding esophageal varices, ascites status post TIPS December 15, 2020, HCC status post radiation.  The patient does not speak English and I was not able to get a good history from him.  Therefore I reviewed his [facility] chart extensively and also his hospital chart.    His primary hepatologist is Dr. Winslow with [facility].  There is a telephone call from the patient's family on January 13, 2021 asking for a weekly paracentesis standing order.  He was having fluid of his lower extremities and face as well.   recommended that patient increase his Lasix from 20 mg once a day to twice daily and to follow-up in the clinic for further assessment.  Unfortunately the patient developed a fever and shortness of breath and was admitted to the hospital.    A TIPS ultrasound Doppler was done and that showed a patent TIPS with antegrade flow.  It also showed a large amount of abdominal ascites.  This ascites was tapped and did come back with evidence for SBP, he is now on antibiotics and he has also received a dose of albumin.  COVID-19 negative.  CT scan of the chest negative for pulmonary embolism.  It did show hepatic lesions that are likely consistent with his HCC.  He also has an umbilical hernia.                        ROS: A comprehensive ten point review of systems was negative aside from those in mentioned in the HPI.      MEDICATIONS: No current facility-administered medications on file prior to encounter.        ferrous sulfate (FEROSUL) 325 (65 Fe) MG tablet, Take 325 mg  "by mouth daily (with breakfast)       furosemide (LASIX) 20 MG tablet, Take 20 mg by mouth 2 times daily        lactulose (CONSTULOSE) 10 GM/15ML solution, TAKE 30ML BY ORAL ROUTE 2 TIMES EVERY DAY        ALLERGIES:   Allergies   Allergen Reactions     Penicillins Other (See Comments)     Syncope         Past Medical History:   Diagnosis Date     Cirrhosis (H)      Dementia      Hypertension        No past surgical history on file.      SOCIAL HISTORY:  Social History     Tobacco Use     Smoking status: Unknown If Ever Smoked     Tobacco comment: Current non-smoker.   Substance Use Topics     Alcohol use: No     Alcohol/week: 0.0 standard drinks     Drug use: No       FAMILY HISTORY:  Reviewed in chart    PHYSICAL EXAM:   /54   Pulse 88   Temp 99.5  F (37.5  C) (Temporal)   Resp 16   Ht 1.626 m (5' 4\")   Wt 56.7 kg (125 lb)   SpO2 100%   BMI 21.46 kg/m      Constitutional: NAD, comfortable  Cardiovascular: RRR  Respiratory: non labored breathing, no supplemental oxygen  Psychiatric: mentation appears normal and affect normal/bright  Head: Normocephalic. Atraumatic.    Neck: Neck supple.  Eyes:  icterus  ENT: Neck without nodes  Abdomen:   Distended with ascites.  Tender to palpation.  Umbilical hernia prominent.  NEURO: grossly negative  SKIN: no suspicious lesions or rashes          ADDITIONAL COMMENTS:   I reviewed the patient's new clinical lab test results.   Recent Labs   Lab Test 01/15/21  0303 11/25/20  1020 10/12/20  1134 07/30/20  1344   WBC 6.2  --  2.9* 4.5   HGB 10.2*  --  12.0* 13.9   MCV 93  --  92 88   * 55* 72* 93*   INR 2.07*  --  1.71* 1.69*     Recent Labs   Lab Test 01/15/21  0957 01/15/21  0303 10/12/20  1134 07/30/20  1344   NA  --  138 134 137   POTASSIUM 3.2* 3.2* 4.5 4.0   CHLORIDE  --  110* 105 105   CO2  --  25 24 26   BUN  --  7 9 9   CR  --  0.70 0.80 0.74   ANIONGAP  --  3 5 6   JOHN  --  7.0* 7.9* 8.1*   GLC  --  124* 81 94     Recent Labs   Lab Test 01/15/21  0526 " 01/15/21  0303 10/12/20  1134 07/30/20  1344 04/04/16  0330 09/18/15  1828 09/18/15  1828 09/18/15  1821   ALBUMIN  --  1.8* 2.4* 2.7*  --    < >  --  3.4   BILITOTAL  --  3.2* 1.6* 2.3*  --    < >  --  0.4   ALT  --  26 18 14  --    < >  --  32   AST  --  55* 31 27  --    < >  --  31   ALKPHOS  --  431* 94 87  --    < >  --  75   PROTEIN Negative  --   --   --  Negative  --  Negative  --    LIPASE  --   --   --   --   --   --   --  233    < > = values in this interval not displayed.             .    CONSULTATION ASSESSMENT AND PLAN:    Robb Vallejo is a 80 year old male with a past medical history significant for decompensated [place] cirrhosis with Portal hypertension/nonbleeding esophageal varices, ascites status post TIPS December 15, 2020 but now with ongoing ascites and a patent TIPS on ultrasound Doppler and CT scan, HCC status post radiation but continues to have liver lesions.  He is currently admitted with SBP.    - SBP-agree with antibiotics and first dose of albumin given today at 1.5 g/kg.  On day 3 he should receive another dose of albumin at 1 g/kg.    -Ascites-the TIPS is patent per both CT scan and Doppler ultrasound.  His ascites is likely from worsening decompensation of his cirrhosis and HCC.  Agree with a therapeutic paracentesis although not urgent since his breathing is stable, and I also note the concern from Dr. Ulrich on kidney protection given that patient received contrast for his CT scan and also received vancomycin in the ER.  Currently his creatinine is normal.  I would hold diuretics in the setting of SBP.    We will continue to follow.    Jud Machuca MD  [facility]

## 2021-01-15 NOTE — ED TRIAGE NOTES
Son will be interpreting for pt as the language is not available on ipad. Pts son normally helps with translation at recent appointments.

## 2021-01-15 NOTE — H&P
Admitted:     01/15/2021      CHIEF COMPLAINT:  Shortness of breath, fever.      HISTORY OF PRESENT ILLNESS:  History was obtained from the patient with his son interpreting as he speaks a language called Tigtj[place].  This is a 80-year-old  male who presents with shortness of breath and fever, which roughly started at midnight.  He has a history of nonalcoholic cirrhosis of the liver, which he has had for maybe almost 2 years.  He used to get periodic paracenteses and subsequently had a TIPS placed on 11/15.  Since then, he does get paracentesis once a week; however, the volume of fluid removed has decreased.  The patient was in his usual state of health until he developed shortness of breath and fever, which came on all of a sudden at midnight and hence he asked his son to bring him into the ER for further evaluation.  Of note, the patient also endorses pain in his abdomen to me.  He did have a paracentesis carried out yesterday with roughly 2.5 liters removed.  In the ER over here, he was seen by Dr. Leiva.  He had a diagnostic paracentesis, which is suggestive of SBP.  I am asked to admit him for further evaluation.      PAST MEDICAL HISTORY:   Significant for liver cirrhosis, hepatocellular carcinoma.  The cirrhosis is nonalcoholic.  Etiology is unclear.  He also has a prior history of rectal carcinoid tumor.      PAST SURGICAL HISTORY:  Significant for colonoscopy, cataract surgery, TIPS procedure carried out on 12/15/2020.      FAMILY HISTORY:  Reviewed and noncontributory.      SOCIAL HISTORY:  He does not smoke or drink alcohol.      HOME MEDICATIONS:  Includes Lasix, Zofran, lactulose.      REVIEW OF SYSTEMS:  As mentioned in the HPI.  The patient endorses some swelling of his lower extremities; however, that is not very visible to me.  He endorsed pain in his chest to the ED.  However, I think this is likely related to his ascitic fluid, as he denies any active chest pain to me.  Otherwise, all  other systems are extensively reviewed and deemed unremarkable and negative.      PHYSICAL EXAMINATION:   VITAL SIGNS:  T-max is 100.8, pulse is 86, blood pressure is 115/54, respiratory rate 24, O2 sat is 100% on room air.   GENERAL:  The patient is alert, awake, oriented x 3, frail, chronically ill and cachectic, accompanied by his son.   HEENT:  His pupils are equal, round, reactive to light.   LUNGS:  Clear to auscultation with diminished breath sounds in his right lower lung bases.   HEART:  Regular rate, S1, S2 normal.  He has a 2/6 systolic murmur.   ABDOMEN:  Soft, distended, positive umbilical hernia which is easily reducible.  Diffuse tenderness, no guarding or rigidity.  He has good bowel sounds.   EXTREMITIES:  There is no edema.   SKIN:  There is no rash.   NEUROLOGIC:  He moves all his extremities.      LABORATORY:  Lab work obtained here shows the following:  Sodium is 138, potassium 3.2, chloride 110, bicarb 25, BUN 7, creatinine 0.70, GFR greater than 90, calcium 7.0, anion gap 3, albumin 1.8, total protein 6.1, total bilirubin 3.2, alkaline phosphatase 431, ALT of 26, AST of 55.  CK of 29.  Lactic acid 2.8.  His BNP is 779.  Troponin is 0.017.  A venous blood gas showed a pH of 7.36 with a pCO2 of 40, pO2 of 16.  On his CBC, his white cell count is 6.2, hemoglobin 10.2, hematocrit 31.2, platelet count of 108.  His diff shows lymphopenia and lymphocyte count of 0.3.  His INR is 2.07.  A urinalysis shows some hematuria.  Blood cultures obtained in the ER are pending.  Influenza screen and SARS screen was read as negative.  His fluid analysis, which was a diagnostic paracentesis, shows a WBC count of 5290 with percentage neutrophils of 79, which puts him in the ballpark for SBP.  A chest x-ray, 1 view, showed relatively low lung volumes, questionable small infiltrate in the right lower lung.  He went on to have a CT of the chest, abdomen and pelvis with contrast, which was read and read as negative  for PE, no evidence of pneumonitis.  Small bilateral pleural effusions, right greater than left.  Minimal pericardial effusion.  There is a small questionable ill-defined asymmetric soft tissue fullness incompletely visualized in the right lower neck.  Clinical correlation in this area and followup as clinically warranted.  Cirrhotic configuration of the liver with patent TIPS in the right hepatic lobe.  However, there is some ill-defined heterogenous and enhancement as well as an indeterminate low attenuation lesions in the right hepatic lobe.  The patient has a history of hepatocellular carcinoma; however, no recent comparison studies available.  Clinical correlation, and correlation with any recent prior studies would be helpful.  Evidence of portal venous hypertension with numerous varices in the abdomen and large amount of ascites throughout the abdomen and pelvis.  There is subtle diffuse wall thickening involving both small bowel and colon.  This is likely secondary to passive venous congestion in the setting of portal venous hypertension.  However, clinical correlation is recommended to exclude any sign of enteritis or colitis, which is felt to be less likely.  Prostatic enlargement, moderate-sized umbilical hernia .  An EKG obtained on him shows sinus tachycardia at 100 beats per minute with low voltage QRS.      ASSESSMENT AND PLAN:   1.  SBP as evidenced by fever, abdominal tenderness and ascitic fluid greater than 250 PMNS.  He has been initiated on vancomycin and cefepime.  For now I will just continue with the cefepime and provide him with albumin as well.  There is no evidence of encephalopathy at this point in time.   2.  Hypokalemia.  Will replace.   3.  Abnormal CT scan.  He does have a prior history of hepatocellular carcinoma and he used to get directed radiation therapy to that area, but has not had any kind of radiation for at least a year or so.  His CT scan is suggestive of some low  attenuating lesions in the right hepatic lobe.  He needs to follow-up with his primary care doctor regarding that.   4.  Enteritis or colitis.  This is felt less likely.  I suspect this is due to passive congestion.  He does not have any ongoing diarrhea.      CODE STATUS:  Confirmed with his son is full code.      He will be admitted as an inpatient.  He may require a therapeutic paracentesis in the next few days.  His CT scan showed a patent TIPS shunt catheter; however, it is remarkable that he has accumulated a fair amount of fluid in a short time; hence, I will get ultrasound to evaluate for his patency of his TIPS.      CODE STATUS:  Full code.      He will be admitted as an inpatient.         ALIYA ROJAS MD             D: 01/15/2021   T: 01/15/2021   MT: JOHANNA      Name:     JOSE ANTONIO VERA   MRN:      [id number]        Account:      [id number]   :      1940        Admitted:     01/15/2021                   Document: [id number]

## 2021-01-15 NOTE — ED TRIAGE NOTES
Pt arrives with complaints of SOB and chills starting around 0000, pt has a hx of liver cancer and had a paracentesis yesterday. Pts son says that he gets paracentesis about every week. Pt also is endorsing some chest pain and nausea. ABCs intact, A/O x4.

## 2021-01-15 NOTE — PROGRESS NOTES
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  Also, reports weakness for 2 days.  He underwent paracentesis with 2.6 L fluid removal day prior to presentation.  Febrile here initially along with elevated lactic acid.  CT chest/abdomen/pelvis with IV contrast was done which showed no sign of PE, small bilateral pleural effusions, minimal pericardial effusion, cirrhosis with large volume ascites, and some subtle thickening of the small bowel and colon which is likely from venous congestion.  Diagnostic paracentesis done in the ER which is consistent with SBP.  Received IV vancomycin and cefepime now narrowed to ceftriaxone.  Receiving IV albumin for SBP.  Consult [facility].    Problem List/Assessment and Plan:   SBP: Presenting with fevers and chills of 1 day duration along with some redness around area of previous paracentesis site and lower abdominal pain.  Has been relatively weak for the last 2 days, but son denies any confusion.  Febrile initially at 100.8  F, no leukocytosis, slight tachycardia.  Lactic acid elevated at 2.8 which normalized after 500 mL normal saline.  Diagnostic paracentesis done in the ER which shows 5290 WBCs with 79% neutrophils consistent with SBP.  He did receive vancomycin and cefepime in the ER.  -Ultrasound TIPS with Doppler was done given his ongoing significant ascites which showed patent antegrade flow and flow velocities ranging from  cm/s  -IV ceftriaxone 2 g every 24 hours starting this afternoon as he received cefepime  earlier  -IV albumin for SBP, 75 g 25% ordered  -intake and output, daily weight  -Consult [facility]  -Still has large amount of ascites, but received vancomycin along with contrast in the ER.  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  He has cirrhosis with significant ascites requiring weekly paracentesis.  Underwent TIPS procedure 12/28/2020 through [facility].  Despite this he still required paracentesis nearly weekly although only has been getting roughly 2.5-4 L off each time.  Underwent paracentesis day PTA on 1/13 with 2.6 L fluid removal.  Previously on nadolol which was discontinued, sometime after TIPS procedure I believe.  He is on Lasix 20 mg twice daily and lactulose 20 g twice daily at baseline.  He has trace-1+ lower extremity edema on exam and still fairly severe distention although abdomen is soft.  Admission bilirubin is 3.2, AST 55, albumin 1.8, and INR 2.0.  -Continue lactulose 20g twice daily, titrate to 3 stools per day  -Hold Lasix initially secondary to SBP    HCC: Previous radioembolization of different liver lesions/HCC through [facility] in May 2020.  For follow-up MRIs the lesions were described as nonviable and smaller.  This is likely what is seen on CT scan here.    Hypokalemia: Potassium 3.2.  Potassium, magnesium, phosphorus replacement protocols ordered.    Chronic anemia and thrombocytopenia: Hemoglobin 10.2 down slightly from baseline.  Platelet count at baseline  K.  -CBC tomorrow    DVT Prophylaxis: Pneumatic Compression Devices  Code Status: Full Code  FEN: 2 g sodium restriction, IV albumin Home with son who he lives with  Discharge Dispo: Home with son who he lives with  Estimated Disch Date / # of Days until Disch: Likely here until Monday or Tuesday, anticipate paracentesis needed then    I saw the patient at the bedside with his son interpreting  "as he has needed to do in the past.  The patient's dialect is not available through telephone/iPad  services.        Interval History (Subjective):      Admitted earlier by Corbin Fragoso MD.  I saw the patient at the bedside with his son interpreting.                    Physical Exam:      Last Vital Signs:  /54   Pulse 88   Temp 99.5  F (37.5  C) (Temporal)   Resp 16   Ht 1.626 m (5' 4\")   Wt 56.7 kg (125 lb)   SpO2 100%   BMI 21.46 kg/m        Intake/Output Summary (Last 24 hours) at 1/15/2021 1304  Last data filed at 1/15/2021 1237  Gross per 24 hour   Intake 580 ml   Output 150 ml   Net 430 ml       Constitutional: Awake, NAD   Eyes: sclera slightly icteric  HEENT:  MMM  Respiratory: Slight tachypnea, bibasilar crackles, no wheeze  Cardiovascular: Mild regular tachycardia, high-pitched 3/6 systolic murmur  GI: Severely distended, but soft, bowel sounds present, nontender  Skin: no rash   Musculoskeletal/extremities: Trace-1+ bilateral lower extremity edema  Neurologic: Alert, follows commands with son interpreting  Psychiatric: calm, cooperative          Medications:      All current medications were reviewed with changes reflected in problem list.         Data:      All new lab and imaging data was reviewed.   Labs:  Recent Labs   Lab 01/15/21  0444 01/15/21  0303 01/15/21  0302   CULT PENDING No growth after 1 hour No growth after 1 hour     Recent Labs   Lab 01/15/21  0303   WBC 6.2   HGB 10.2*   HCT 31.2*   MCV 93   *     Recent Labs   Lab 01/15/21  0957 01/15/21  0303   NA  --  138   POTASSIUM 3.2* 3.2*   CHLORIDE  --  110*   CO2  --  25   ANIONGAP  --  3   GLC  --  124*   BUN  --  7   CR  --  0.70   GFRESTIMATED  --  >90   GFRESTBLACK  --  >90   JOHN  --  7.0*   PROTTOTAL  --  6.1*   ALBUMIN  --  1.8*   BILITOTAL  --  3.2*   ALKPHOS  --  431*   AST  --  55*   ALT  --  26     INR 2.0  Peritoneal fluid: 5290 WBCs 79% neutrophils    Imaging:   Recent Results (from the past 24 " hour(s))   US Paracentesis    Narrative    US PARACENTESIS  1/14/2021 2:58 PM       HISTORY: Liver cirrhosis secondary to WOODWARD (H). Unspecified cirrhosis  of liver (H).      IV albumin 25% at 25 gram for every 3 liters you tap, not to exceed 75  grams.      PROCEDURE:  Written informed consent was obtained from the patient  prior to the procedure. The risks and benefits including bleeding,  infection and abdominal organ injury were discussed and the patient  wished to continue. Initial ultrasound images demonstrated a large  left lower quadrant fluid collection. A permanent image was saved. The  skin overlying this collection was marked, prepped, draped and  anesthetized in usual sterile fashion utilizing 10 mL of lidocaine 1%.  The paracentesis catheter was then placed into the peritoneal fluid  collection with return of 2600 mL of demetrius colored fluid. Estimated  blood loss during the procedure was less than 5 mL. No specimens  collected. Patient tolerated the procedure well. The patient will  receive intravenous albumin on the usual sliding scale as needed per  protocol.      With continuous ultrasound guidance, an 8 French needle and catheter  was advanced into the ascites and ultrasound image stored for  documentation.      Impression    IMPRESSION:  Ultrasound-guided paracentesis.  2.6 L removed.    JADIEL NICOLAS MD   XR Chest Port 1 View    Narrative    EXAM: XR CHEST PORT 1 VW  LOCATION: [facility]  DATE/TIME: 1/15/2021 3:27 AM    INDICATION: Difficulty breathing.  COMPARISON: 04/04/2016.      Impression    IMPRESSION: Relatively low lung volumes. Mild interstitial prominence. Questionable small area of infiltrate of the lower right lung. Mild atelectasis or scarring at the left base. Normal heart size. Calcified plaque of the aortic arch. No pleural fluid   or pneumothorax.   CT Chest (PE) Abdomen Pelvis w Contrast    Narrative    EXAM: CT CHEST PE ABDOMEN PELVIS W CONTRAST  LOCATION:  [facility]  DATE/TIME: 1/15/2021 3:59 AM    INDICATION: Sepsis, shortness of breath.    COMPARISON: None.    TECHNIQUE: CT chest pulmonary angiogram and routine CT abdomen pelvis with IV contrast. Arterial phase through the chest and venous phase through the abdomen and pelvis. Multiplanar reformats and MIP reconstructions were performed. Dose reduction   techniques were used.     CONTRAST: 100 mL Isovue-[id number].    FINDINGS:  ANGIOGRAM CHEST: Pulmonary arteries are normal caliber and negative for pulmonary emboli. Thoracic aorta is negative for dissection. No CT evidence of right heart strain.     LUNGS AND PLEURA: No acute infiltrates or consolidation. Mild scattered subpleural scarring and/or atelectasis in the lungs. Central airways are clear. Small bilateral pleural effusions, right greater than left.    MEDIASTINUM/AXILLAE: No hilar or mediastinal adenopathy. Normal heart size. Minimal pericardial effusion. Normal caliber thoracic aorta. Coronary artery calcification. Esophagus is grossly negative. Question some ill-defined asymmetric soft tissue   fullness incompletely visualized in the right lower neck. Clinical correlation in this area. Axillary regions are unremarkable.    HEPATOBILIARY: Cirrhotic configuration of the liver with diffuse nodular contour and areas of parenchymal atrophy. Ill-defined area of heterogeneous enhancement in the right hepatic lobe superiorly along with a few areas of indeterminate low-attenuation   nodularity elsewhere in the right hepatic lobe. For example, there is an indeterminate 2.0 cm low-attenuation lesion in the right hepatic lobe posteriorly on image 60. These are indeterminate. Review of the patient's history indicates that the patient   has a history of hepatocellular carcinoma. No recent prior studies are available for comparison. TIPS shunt catheter in place in the right hepatic lobe; this appears patent. Gallbladder is either absent or contracted. There may  be a couple gallstones   within a contracted gallbladder. No biliary dilatation.    PANCREAS: Normal.    SPLEEN: Spleen is enlarged measuring up to 15 cm in length compatible with portal venous hypertension.    ADRENAL GLANDS: Normal.    KIDNEYS/BLADDER: There are several small benign-appearing bilateral renal cysts with no follow-up needed. Kidneys are otherwise negative. Excreted contrast material in the renal collecting systems and bladder. No hydronephrosis.    BOWEL: Stomach is grossly negative. Bowel is normal in caliber with no evidence for obstruction. There is some subtle generalized mild wall thickening involving both the small bowel and colon. This is favored to be secondary to underlying passive venous   congestion and portal venous hypertension rather than enteritis or colitis. Clinical correlation. Normal appendix.    LYMPH NODES: Nonspecific borderline to mildly enlarged bilateral groin lymph nodes are nonspecific.    VASCULATURE: Normal caliber abdominal aorta. Numerous varices in the upper abdomen compatible with portal venous hypertension.    PELVIC ORGANS: Prostatic enlargement.    ADDITIONAL FINDINGS: Large amount of ascites throughout the abdomen and pelvis. Generalized subcutaneous edema. Moderate-sized umbilical hernia containing ascites.    MUSCULOSKELETAL: Mild scattered degenerative changes in the spine.      Impression    IMPRESSION:  1.  Negative for pulmonary embolism.    2.  No evidence for pneumonitis.    3.  Small bilateral pleural effusions, right greater than left.    4.  Minimal pericardial effusion.    5.  There is some questionable ill-defined asymmetric soft tissue fullness incompletely visualized in the right lower neck. Clinical correlation in this area and follow-up as clinically warranted.    6.  Cirrhotic configuration of the liver with a patent TIPS shunt catheter in the right hepatic lobe. However, there is some ill-defined heterogeneous enhancement as well as some  indeterminate low-attenuation lesions in the right hepatic lobe. Patient   has a history of hepatocellular carcinoma per review of the chart. However, no recent comparison studies are available. Clinical correlation and correlation with any recent prior studies would be helpful.    7.  Evidence of portal venous hypertension with splenic enlargement, numerous varices in the abdomen, and a large amount of ascites throughout the abdomen and pelvis.    8.  There is some subtle diffuse wall thickening involving both small bowel and colon. This is favored to be more likely secondary to passive venous congestion in the setting of portal venous hypertension, however clinical correlation is recommended to   exclude any signs of enteritis or colitis which is felt to be less likely.    9.  Prostatic enlargement.    10.  Moderate-sized umbilical hernia contains ascites.     US TIPSS Doppler    Narrative    US TIPSS DOPPLER   1/15/2021 9:07 AM    INDICATION: Hepatic cirrhosis. TIPS placement. Recurrence ascites.    COMPARISON: No pertinent comparison study is available for review.    FINDINGS:   The TIPS is patent with antegrade flow and flow velocities ranging  from  cm/s. No discrete stenosis is identified. A large amount  of intra-abdominal ascites is present.    MD Rad CHAPMAN MD

## 2021-01-16 LAB
ALBUMIN SERPL-MCNC: 2.1 G/DL (ref 3.4–5)
ALP SERPL-CCNC: 230 U/L (ref 40–150)
ALT SERPL W P-5'-P-CCNC: 13 U/L (ref 0–70)
ANION GAP SERPL CALCULATED.3IONS-SCNC: 5 MMOL/L (ref 3–14)
ANISOCYTOSIS BLD QL SMEAR: SLIGHT
AST SERPL W P-5'-P-CCNC: 24 U/L (ref 0–45)
BASOPHILS # BLD AUTO: 0 10E9/L (ref 0–0.2)
BASOPHILS NFR BLD AUTO: 0 %
BILIRUB SERPL-MCNC: 4.7 MG/DL (ref 0.2–1.3)
BUN SERPL-MCNC: 8 MG/DL (ref 7–30)
CALCIUM SERPL-MCNC: 7.2 MG/DL (ref 8.5–10.1)
CHLORIDE SERPL-SCNC: 113 MMOL/L (ref 94–109)
CO2 SERPL-SCNC: 22 MMOL/L (ref 20–32)
CREAT SERPL-MCNC: 0.58 MG/DL (ref 0.66–1.25)
DIFFERENTIAL METHOD BLD: ABNORMAL
EOSINOPHIL # BLD AUTO: 0.1 10E9/L (ref 0–0.7)
EOSINOPHIL NFR BLD AUTO: 4.9 %
ERYTHROCYTE [DISTWIDTH] IN BLOOD BY AUTOMATED COUNT: 15.9 % (ref 10–15)
ERYTHROCYTE [DISTWIDTH] IN BLOOD BY AUTOMATED COUNT: 15.9 % (ref 10–15)
GFR SERPL CREATININE-BSD FRML MDRD: >90 ML/MIN/{1.73_M2}
GLUCOSE SERPL-MCNC: 74 MG/DL (ref 70–99)
HCT VFR BLD AUTO: 19.9 % (ref 40–53)
HCT VFR BLD AUTO: 22 % (ref 40–53)
HGB BLD-MCNC: 6.5 G/DL (ref 13.3–17.7)
HGB BLD-MCNC: 7.3 G/DL (ref 13.3–17.7)
HGB BLD-MCNC: 7.4 G/DL (ref 13.3–17.7)
HGB BLD-MCNC: 7.7 G/DL (ref 13.3–17.7)
IMM GRANULOCYTES # BLD: 0 10E9/L (ref 0–0.4)
IMM GRANULOCYTES NFR BLD: 0.5 %
LYMPHOCYTES # BLD AUTO: 0.2 10E9/L (ref 0.8–5.3)
LYMPHOCYTES NFR BLD AUTO: 11.2 %
MAGNESIUM SERPL-MCNC: 2 MG/DL (ref 1.6–2.3)
MCH RBC QN AUTO: 31.1 PG (ref 26.5–33)
MCH RBC QN AUTO: 31.5 PG (ref 26.5–33)
MCHC RBC AUTO-ENTMCNC: 32.7 G/DL (ref 31.5–36.5)
MCHC RBC AUTO-ENTMCNC: 33.2 G/DL (ref 31.5–36.5)
MCV RBC AUTO: 95 FL (ref 78–100)
MCV RBC AUTO: 95 FL (ref 78–100)
MONOCYTES # BLD AUTO: 0.3 10E9/L (ref 0–1.3)
MONOCYTES NFR BLD AUTO: 14.1 %
NEUTROPHILS # BLD AUTO: 1.4 10E9/L (ref 1.6–8.3)
NEUTROPHILS NFR BLD AUTO: 69.3 %
NRBC # BLD AUTO: 0 10*3/UL
NRBC BLD AUTO-RTO: 0 /100
OVALOCYTES BLD QL SMEAR: SLIGHT
PHOSPHATE SERPL-MCNC: 2 MG/DL (ref 2.5–4.5)
PLATELET # BLD AUTO: 37 10E9/L (ref 150–450)
PLATELET # BLD AUTO: 46 10E9/L (ref 150–450)
PLATELET # BLD EST: ABNORMAL 10*3/UL
POTASSIUM SERPL-SCNC: 3.5 MMOL/L (ref 3.4–5.3)
PROT SERPL-MCNC: 4.8 G/DL (ref 6.8–8.8)
RBC # BLD AUTO: 2.09 10E12/L (ref 4.4–5.9)
RBC # BLD AUTO: 2.32 10E12/L (ref 4.4–5.9)
SODIUM SERPL-SCNC: 140 MMOL/L (ref 133–144)
WBC # BLD AUTO: 2.1 10E9/L (ref 4–11)
WBC # BLD AUTO: 2.4 10E9/L (ref 4–11)

## 2021-01-16 PROCEDURE — 86901 BLOOD TYPING SEROLOGIC RH(D): CPT | Performed by: INTERNAL MEDICINE

## 2021-01-16 PROCEDURE — 86900 BLOOD TYPING SEROLOGIC ABO: CPT | Performed by: INTERNAL MEDICINE

## 2021-01-16 PROCEDURE — 85027 COMPLETE CBC AUTOMATED: CPT | Performed by: INTERNAL MEDICINE

## 2021-01-16 PROCEDURE — 250N000011 HC RX IP 250 OP 636: Performed by: INTERNAL MEDICINE

## 2021-01-16 PROCEDURE — 36415 COLL VENOUS BLD VENIPUNCTURE: CPT | Performed by: INTERNAL MEDICINE

## 2021-01-16 PROCEDURE — 258N000003 HC RX IP 258 OP 636: Performed by: INTERNAL MEDICINE

## 2021-01-16 PROCEDURE — 250N000013 HC RX MED GY IP 250 OP 250 PS 637: Performed by: INTERNAL MEDICINE

## 2021-01-16 PROCEDURE — 86923 COMPATIBILITY TEST ELECTRIC: CPT | Performed by: INTERNAL MEDICINE

## 2021-01-16 PROCEDURE — 85025 COMPLETE CBC W/AUTO DIFF WBC: CPT | Performed by: INTERNAL MEDICINE

## 2021-01-16 PROCEDURE — 84100 ASSAY OF PHOSPHORUS: CPT | Performed by: INTERNAL MEDICINE

## 2021-01-16 PROCEDURE — 86850 RBC ANTIBODY SCREEN: CPT | Performed by: INTERNAL MEDICINE

## 2021-01-16 PROCEDURE — C9113 INJ PANTOPRAZOLE SODIUM, VIA: HCPCS | Performed by: INTERNAL MEDICINE

## 2021-01-16 PROCEDURE — 120N000001 HC R&B MED SURG/OB

## 2021-01-16 PROCEDURE — 83735 ASSAY OF MAGNESIUM: CPT | Performed by: INTERNAL MEDICINE

## 2021-01-16 PROCEDURE — 99233 SBSQ HOSP IP/OBS HIGH 50: CPT | Performed by: INTERNAL MEDICINE

## 2021-01-16 PROCEDURE — 85018 HEMOGLOBIN: CPT | Performed by: INTERNAL MEDICINE

## 2021-01-16 PROCEDURE — 80053 COMPREHEN METABOLIC PANEL: CPT | Performed by: INTERNAL MEDICINE

## 2021-01-16 RX ADMIN — LACTULOSE 20 G: 20 SOLUTION ORAL at 08:36

## 2021-01-16 RX ADMIN — LACTULOSE 20 G: 20 SOLUTION ORAL at 20:29

## 2021-01-16 RX ADMIN — PANTOPRAZOLE SODIUM 40 MG: 40 INJECTION, POWDER, FOR SOLUTION INTRAVENOUS at 10:40

## 2021-01-16 RX ADMIN — CEFTRIAXONE 2 G: 2 INJECTION, POWDER, FOR SOLUTION INTRAMUSCULAR; INTRAVENOUS at 16:13

## 2021-01-16 RX ADMIN — PANTOPRAZOLE SODIUM 40 MG: 40 INJECTION, POWDER, FOR SOLUTION INTRAVENOUS at 20:29

## 2021-01-16 RX ADMIN — OCTREOTIDE ACETATE 50 MCG/HR: 200 INJECTION, SOLUTION INTRAVENOUS; SUBCUTANEOUS at 10:33

## 2021-01-16 ASSESSMENT — ACTIVITIES OF DAILY LIVING (ADL)
ADLS_ACUITY_SCORE: 15
ADLS_ACUITY_SCORE: 14
ADLS_ACUITY_SCORE: 15
ADLS_ACUITY_SCORE: 14
ADLS_ACUITY_SCORE: 15
ADLS_ACUITY_SCORE: 14

## 2021-01-16 ASSESSMENT — MIFFLIN-ST. JEOR: SCORE: 1199.36

## 2021-01-16 NOTE — PROGRESS NOTES
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  Also, reports weakness for 2 days.  He underwent paracentesis with 2.6 L fluid removal day prior to presentation.  Febrile here initially along with elevated lactic acid.  CT chest/abdomen/pelvis with IV contrast was done which showed no sign of PE, small bilateral pleural effusions, minimal pericardial effusion, cirrhosis with large volume ascites, and some subtle thickening of the small bowel and colon which is likely from venous congestion.  Diagnostic paracentesis done in the ER which is consistent with SBP.  Received IV vancomycin and cefepime now narrowed to ceftriaxone.  Receiving IV albumin for SBP.  Consulted [facility].  Significant drop in hemoglobin, however no evidence for active bleeding and he is asymptomatic.  Suspect dilutional from 75 g IV albumin 1 L normal saline.  Empirically starting twice daily IV Protonix and octreotide drip just in case given history of esophageal varices.    Problem List/Assessment and Plan:   SBP: Presenting with fevers and chills of 1 day duration along with some redness around area of previous paracentesis site and lower abdominal pain.  Has been relatively weak for the last 2 days, but son denies any confusion.  Febrile initially at 100.8  F, no leukocytosis, slight tachycardia.  Lactic acid elevated at 2.8 which normalized after 500 mL normal saline.  Diagnostic paracentesis done in the ER which shows 5290 WBCs with 79% neutrophils consistent with SBP.  He did receive  vancomycin and cefepime in the ER.  -Ultrasound TIPS with Doppler was done given his ongoing significant ascites which showed patent antegrade flow and flow velocities ranging from  cm/s  -IV ceftriaxone 2 g every 24 hours for SBP  -IV albumin for SBP, received 75 g 25% 1/15.  He will need 50 g IV day 3 on 1/17  -intake and output, daily weight  -Consulted [facility], appreciate recommendations  -Will plan on large volume and diagnostic paracentesis on Monday if IR is able otherwise Tuesday    Cirrhosis secondary to WOODWARD: Most of his care is through [facility].  He has cirrhosis with significant ascites requiring weekly paracentesis.  Underwent TIPS procedure 12/28/2020 through [facility].  Despite this he still required paracentesis nearly weekly although only has been getting roughly 2.5-4 L off each time.  Underwent paracentesis day PTA on 1/13 with 2.6 L fluid removal.  Previously on nadolol which was discontinued, sometime after TIPS procedure I believe.  He is on Lasix 20 mg twice daily and lactulose 20 g twice daily at baseline.  He has trace-1+ lower extremity edema on exam and still fairly severe distention although abdomen is soft.  Admission bilirubin is 3.2, AST 55, albumin 1.8, and INR 2.0.  -Continue lactulose 20g twice daily, titrate to 3 stools per day  -Hold Lasix initially secondary to mild hypotension and significant drop in hemoglobin in case of bleeding    Acute on chronic anemia: Hemoglobin initially at 10.2 which is his baseline.  Significant drop in hemoglobin next morning to 6.5 with repeat 2 hours later up to 7.3.  Asymptomatic from this and blood pressure remains  systolic where it has been.  Did have a bowel movement with lactulose and no red or black discoloration.  I suspect this is mostly dilutional from 1 L NS and 75 g IV albumin he received throughout yesterday.  Less suspicious for GI bleed, although still possible given esophageal varices and his cirrhosis.  Other source of  bleeding would be intra-abdominal from the 2 recent paracentesis although he is having no abdominal pain now and if significant bleeding in this area it should be at least causing some pain.  -Trend hemoglobin every 6 hours, hold off on transfusion this morning as I suspect some of this is dilutional as above.  If hemoglobin dropping below 7 on repeat checks placed order for conditional 1 unit RBCs.  Patient's son signed consent form.  If needing transfusion later or blood pressure dropping into the 80s make n.p.o. in case of need for EGD.  -Given history of esophageal varices I will start IV Protonix twice daily and octreotide drip empirically until hemoglobin appears more stable.    HCC: Previous radioembolization of different liver lesions/HCC through [facility] system in May 2020.  For follow-up MRIs the lesions were described as nonviable and smaller.  This is likely what is seen on CT scan here.    Hypokalemia: Potassium 3.2.  Potassium, magnesium, phosphorus replacement protocols ordered.    Thrombocytopenia:  Platelet count at baseline 70-100K initially with drop to 37K next day, some of this may be dilutional.  -CBC tomorrow    DVT Prophylaxis: Pneumatic Compression Devices  Code Status: Full Code  FEN: 2 g sodium restriction  Discharge Dispo: Home with son who he lives with  Estimated Disch Date / # of Days until Disch: Likely here until Monday or Tuesday, anticipate paracentesis needed then    I saw the patient at the bedside with his son interpreting as he has needed to do in the past.  The patient's dialect is not available through telephone/iPad  services.        Interval History (Subjective):      No acute events overnight.  Had a bowel movement, no blood or melena seen.  Afebrile.  Significant drop in hemoglobin following albumin and 1 L IV fluids yesterday.  Denies lightheadedness, chest pain, shortness of breath.  Lower abdominal pain has resolved.  Does have some increased ankle and leg  "swelling.                  Physical Exam:      Last Vital Signs:  BP (!) 95/33 (BP Location: Left arm)   Pulse 73   Temp 97.6  F (36.4  C) (Temporal)   Resp 18   Ht 1.626 m (5' 4\")   Wt 56.7 kg (125 lb)   SpO2 95%   BMI 21.46 kg/m      Intake/Output Summary (Last 24 hours) at 1/16/2021 0937  Last data filed at 1/16/2021 0647  Gross per 24 hour   Intake 940 ml   Output 750 ml   Net 190 ml       Constitutional: Awake, NAD   Eyes: sclera slightly icteric  HEENT:  MMM  Respiratory: no focal crackles or wheeze  Cardiovascular: RRR, high-pitched 3/6 systolic murmur  GI: Severely distended, but remains soft, bowel sounds present, nontender to palpation  Skin: no rash   Musculoskeletal/extremities: 1+ bilateral lower extremity edema  Neurologic: Alert, follows commands with son interpreting  Psychiatric: calm, cooperative          Medications:      All current medications were reviewed with changes reflected in problem list.         Data:      All new lab and imaging data was reviewed.   Labs:  Recent Labs   Lab 01/15/21  0444 01/15/21  0303 01/15/21  0302   CULT Culture negative monitoring continues  Culture negative monitoring continues No growth after 1 day No growth after 1 day     Recent Labs   Lab 01/16/21  0821 01/16/21  0611 01/15/21  0303   WBC 2.4* 2.1* 6.2   HGB 7.3* 6.5* 10.2*   HCT 22.0* 19.9* 31.2*   MCV 95 95 93   PLT 46* 37* 108*     Recent Labs   Lab 01/16/21  0611 01/15/21  2148 01/15/21  1635 01/15/21  0303 01/15/21  0303     --   --   --  138   POTASSIUM 3.5 3.6 3.2*   < > 3.2*   CHLORIDE 113*  --   --   --  110*   CO2 22  --   --   --  25   ANIONGAP 5  --   --   --  3   GLC 74  --   --   --  124*   BUN 8  --   --   --  7   CR 0.58*  --   --   --  0.70   GFRESTIMATED >90  --   --   --  >90   GFRESTBLACK >90  --   --   --  >90   JOHN 7.2*  --   --   --  7.0*   MAG 2.0  --   --   --   --    PHOS 2.0*  --   --   --   --    PROTTOTAL 4.8*  --   --   --  6.1*   ALBUMIN 2.1*  --   --   --  1.8* "   BILITOTAL 4.7*  --   --   --  3.2*   ALKPHOS 230*  --   --   --  431*   AST 24  --   --   --  55*   ALT 13  --   --   --  26    < > = values in this interval not displayed.         Imaging:   None today    Rad Ulrich MD

## 2021-01-16 NOTE — PLAN OF CARE
"/48   Pulse 86   Temp 98.1  F (36.7  C) (Temporal)   Resp 18   Ht 1.626 m (5' 4\")   Wt 56.7 kg (125 lb)   SpO2 100%   BMI 21.46 kg/m    Patient alert and oriented forgetful at times. Patient restring comfortably in between cares. VSS, afebrile this shift. Denies pain at this time. Tele NSR heart rate in the 80's. Abdomen distended, rounded and tender to the touch. Voiding without difficulty in adequate amounts. Decreased appetite, tolerating at this time. Potassium recheck was 3.6. Plan to discharge home once medically stable. Will continue plan of cares.  "

## 2021-01-16 NOTE — PROVIDER NOTIFICATION
DATE:  1/16/2021   TIME OF RECEIPT FROM LAB:  0720  LAB TEST:  Platelet, Hemoglobin  LAB VALUE:  37, 6.5  RESULTS GIVEN WITH READ-BACK TO (PROVIDER):  Rad Ulrich MD  TIME LAB VALUE REPORTED TO PROVIDER:   0748        0740: Repeat CBC at 0830 with type and screen

## 2021-01-16 NOTE — PLAN OF CARE
PT alert and oriented although can be forgetful. PT set off bed alarms getting up to bathroom overnight. Had one BM. PT rests comfortably between cares and denies pain. Bowel sounds hypoactive. Abdomen distended and rounded. PT on IV Rocephin. Up with A-1. Voiding spontaneously. PT got a small skin tear to L elbow, per PT he bumped his elbow on the bed rail. Foam dressing applied. Plan to discharge back home when stable. Plan of care reviewed with patient and son.

## 2021-01-16 NOTE — PLAN OF CARE
A/O x 4, forgetful.  Son at bedside to interpret.  VSS, afebrile.  Denies pain.  Remote telemetry monitoring. Up with A1.  BS hypoactive.  Abdomen rounded, soft, nontender.  Gas+, last BM today, no blood noted.  Voiding adequately via urinal.  Tolerating diet, minimal appetite.  Paracentesis site CDI.  IV Rocephin. Octreotide infusing.  Rested comfortably in bed between cares.  Alarms on for patient safety.  Plan is to discharge to home when medically stable.  Will continue to monitor.

## 2021-01-16 NOTE — PROGRESS NOTES
GI Staff    Alert, non-english speaking  Fever resolved, VSS    +jaundice/icterus  Abd: distended, +BS, soft, minimally tender left mid, umbilical hernia.    Labs reviewed.    A/P:  HCC with decompensated WOODWARD cirrhosis, portal htn, ascites. No active GI bleeding, on PPI and octreotide. Poor prognosis.  1. +SBP treating with IV abx  2. TIPS patent on ultrasound.  3. Consider repeat paracentesis for Monday.

## 2021-01-17 LAB
ALBUMIN SERPL-MCNC: 2.1 G/DL (ref 3.4–5)
ALP SERPL-CCNC: 235 U/L (ref 40–150)
ALT SERPL W P-5'-P-CCNC: 16 U/L (ref 0–70)
ANION GAP SERPL CALCULATED.3IONS-SCNC: 4 MMOL/L (ref 3–14)
AST SERPL W P-5'-P-CCNC: 24 U/L (ref 0–45)
BILIRUB SERPL-MCNC: 3.6 MG/DL (ref 0.2–1.3)
BUN SERPL-MCNC: 6 MG/DL (ref 7–30)
CALCIUM SERPL-MCNC: 7.2 MG/DL (ref 8.5–10.1)
CHLORIDE SERPL-SCNC: 114 MMOL/L (ref 94–109)
CO2 SERPL-SCNC: 23 MMOL/L (ref 20–32)
CREAT SERPL-MCNC: 0.6 MG/DL (ref 0.66–1.25)
ERYTHROCYTE [DISTWIDTH] IN BLOOD BY AUTOMATED COUNT: 15.4 % (ref 10–15)
GFR SERPL CREATININE-BSD FRML MDRD: >90 ML/MIN/{1.73_M2}
GLUCOSE SERPL-MCNC: 140 MG/DL (ref 70–99)
HCT VFR BLD AUTO: 23.1 % (ref 40–53)
HGB BLD-MCNC: 7.5 G/DL (ref 13.3–17.7)
INR PPP: 2.65 (ref 0.86–1.14)
MAGNESIUM SERPL-MCNC: 2 MG/DL (ref 1.6–2.3)
MCH RBC QN AUTO: 30.7 PG (ref 26.5–33)
MCHC RBC AUTO-ENTMCNC: 32.5 G/DL (ref 31.5–36.5)
MCV RBC AUTO: 95 FL (ref 78–100)
PHOSPHATE SERPL-MCNC: 1.9 MG/DL (ref 2.5–4.5)
PHOSPHATE SERPL-MCNC: 2 MG/DL (ref 2.5–4.5)
PLATELET # BLD AUTO: 52 10E9/L (ref 150–450)
POTASSIUM SERPL-SCNC: 3.3 MMOL/L (ref 3.4–5.3)
POTASSIUM SERPL-SCNC: 3.5 MMOL/L (ref 3.4–5.3)
PROT SERPL-MCNC: 5.3 G/DL (ref 6.8–8.8)
RBC # BLD AUTO: 2.44 10E12/L (ref 4.4–5.9)
SODIUM SERPL-SCNC: 141 MMOL/L (ref 133–144)
WBC # BLD AUTO: 2.4 10E9/L (ref 4–11)

## 2021-01-17 PROCEDURE — 84132 ASSAY OF SERUM POTASSIUM: CPT | Performed by: INTERNAL MEDICINE

## 2021-01-17 PROCEDURE — 120N000001 HC R&B MED SURG/OB

## 2021-01-17 PROCEDURE — 85027 COMPLETE CBC AUTOMATED: CPT | Performed by: INTERNAL MEDICINE

## 2021-01-17 PROCEDURE — 99233 SBSQ HOSP IP/OBS HIGH 50: CPT | Performed by: INTERNAL MEDICINE

## 2021-01-17 PROCEDURE — 83735 ASSAY OF MAGNESIUM: CPT | Performed by: INTERNAL MEDICINE

## 2021-01-17 PROCEDURE — 85610 PROTHROMBIN TIME: CPT | Performed by: INTERNAL MEDICINE

## 2021-01-17 PROCEDURE — C9113 INJ PANTOPRAZOLE SODIUM, VIA: HCPCS | Performed by: INTERNAL MEDICINE

## 2021-01-17 PROCEDURE — 84100 ASSAY OF PHOSPHORUS: CPT | Performed by: INTERNAL MEDICINE

## 2021-01-17 PROCEDURE — P9047 ALBUMIN (HUMAN), 25%, 50ML: HCPCS | Performed by: INTERNAL MEDICINE

## 2021-01-17 PROCEDURE — 250N000009 HC RX 250: Performed by: INTERNAL MEDICINE

## 2021-01-17 PROCEDURE — 250N000011 HC RX IP 250 OP 636: Performed by: INTERNAL MEDICINE

## 2021-01-17 PROCEDURE — 80053 COMPREHEN METABOLIC PANEL: CPT | Performed by: INTERNAL MEDICINE

## 2021-01-17 PROCEDURE — 250N000013 HC RX MED GY IP 250 OP 250 PS 637: Performed by: INTERNAL MEDICINE

## 2021-01-17 PROCEDURE — 36415 COLL VENOUS BLD VENIPUNCTURE: CPT | Performed by: INTERNAL MEDICINE

## 2021-01-17 RX ORDER — FUROSEMIDE 20 MG
20 TABLET ORAL
Status: COMPLETED | OUTPATIENT
Start: 2021-01-17 | End: 2021-01-17

## 2021-01-17 RX ORDER — ALBUMIN (HUMAN) 12.5 G/50ML
50 SOLUTION INTRAVENOUS ONCE
Status: COMPLETED | OUTPATIENT
Start: 2021-01-17 | End: 2021-01-17

## 2021-01-17 RX ORDER — POTASSIUM CHLORIDE 1500 MG/1
20 TABLET, EXTENDED RELEASE ORAL ONCE
Status: COMPLETED | OUTPATIENT
Start: 2021-01-17 | End: 2021-01-17

## 2021-01-17 RX ADMIN — PANTOPRAZOLE SODIUM 40 MG: 40 INJECTION, POWDER, FOR SOLUTION INTRAVENOUS at 20:44

## 2021-01-17 RX ADMIN — POTASSIUM CHLORIDE 20 MEQ: 1500 TABLET, EXTENDED RELEASE ORAL at 10:13

## 2021-01-17 RX ADMIN — POTASSIUM & SODIUM PHOSPHATES POWDER PACK 280-160-250 MG 2 PACKET: 280-160-250 PACK at 20:41

## 2021-01-17 RX ADMIN — LACTULOSE 20 G: 20 SOLUTION ORAL at 08:22

## 2021-01-17 RX ADMIN — PANTOPRAZOLE SODIUM 40 MG: 40 INJECTION, POWDER, FOR SOLUTION INTRAVENOUS at 08:23

## 2021-01-17 RX ADMIN — POTASSIUM & SODIUM PHOSPHATES POWDER PACK 280-160-250 MG 2 PACKET: 280-160-250 PACK at 13:37

## 2021-01-17 RX ADMIN — PHYTONADIONE 10 MG: 10 INJECTION, EMULSION INTRAMUSCULAR; INTRAVENOUS; SUBCUTANEOUS at 11:44

## 2021-01-17 RX ADMIN — FUROSEMIDE 20 MG: 20 TABLET ORAL at 16:58

## 2021-01-17 RX ADMIN — ALBUMIN HUMAN 50 G: 0.25 SOLUTION INTRAVENOUS at 08:22

## 2021-01-17 RX ADMIN — POTASSIUM & SODIUM PHOSPHATES POWDER PACK 280-160-250 MG 2 PACKET: 280-160-250 PACK at 10:14

## 2021-01-17 RX ADMIN — CEFTRIAXONE 2 G: 2 INJECTION, POWDER, FOR SOLUTION INTRAMUSCULAR; INTRAVENOUS at 18:05

## 2021-01-17 RX ADMIN — FUROSEMIDE 20 MG: 20 TABLET ORAL at 10:14

## 2021-01-17 ASSESSMENT — ACTIVITIES OF DAILY LIVING (ADL)
ADLS_ACUITY_SCORE: 16

## 2021-01-17 ASSESSMENT — MIFFLIN-ST. JEOR: SCORE: 1204.35

## 2021-01-17 NOTE — PROGRESS NOTES
GI Staff     Alert, non-english speaking, family member present today to interpret.  The patient is concerned that his ascites will increase.    Fever resolved, VSS     +jaundice/icterus  Abd: distended, soft, minimally tender left mid, umbilical hernia.     Labs reviewed.     A/P:  HCC with decompensated WOODWARD cirrhosis, portal htn, ascites. No active GI bleeding, on PPI and octreotide. Poor prognosis.  1. +SBP treating with IV abx  2. TIPS patent on ultrasound.  3. Consider repeat paracentesis for Monday or Tuesday.

## 2021-01-17 NOTE — PROGRESS NOTES
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  Also, reports weakness for 2 days.  He underwent paracentesis with 2.6 L fluid removal day prior to presentation.  Febrile here initially along with elevated lactic acid.  CT chest/abdomen/pelvis with IV contrast was done which showed no sign of PE, small bilateral pleural effusions, minimal pericardial effusion, cirrhosis with large volume ascites, and some subtle thickening of the small bowel and colon which is likely from venous congestion.  Diagnostic paracentesis done in the ER which is consistent with SBP.  Received IV vancomycin and cefepime now narrowed to ceftriaxone.  Receiving IV albumin for SBP.  Consulted [facility].  Significant drop in hemoglobin, however no evidence for active bleeding and he is asymptomatic.  Suspect dilutional from 75 g IV albumin 1 L normal saline.  Empirically starting twice daily IV Protonix and octreotide drip just in case given history of esophageal varices.    Problem List/Assessment and Plan:   SBP: Presenting with fevers and chills of 1 day duration along with some redness around area of previous paracentesis site and lower abdominal pain.  Has been relatively weak for the last 2 days, but son denies any confusion.  Febrile initially at 100.8  F, no leukocytosis, slight tachycardia.  Lactic acid elevated at 2.8 which normalized after 500 mL normal saline.  Diagnostic paracentesis done in the ER which shows 5290 WBCs with 79% neutrophils consistent with SBP.  He did receive  vancomycin and cefepime in the ER.  -Ultrasound TIPS with Doppler was done given his ongoing significant ascites which showed patent antegrade flow and flow velocities ranging from  cm/s  -IV ceftriaxone 2 g every 24 hours for SBP, plan for 5-day course  -IV albumin for SBP, received 75 g 25% 1/15.  Give 50 g IV day 3, today   -intake and output, daily weight  -Consulted [facility], appreciate recommendations  -Ordered large volume and diagnostic paracentesis for tomorrow if able  -INR up to 2.65 today, give 10 mg oral vitamin K today in case any of this is nutritional and not just from a cirrhosis    Cirrhosis secondary to WOODWARD: Most of his care is through [facility].  He has cirrhosis with significant ascites requiring weekly paracentesis.  Underwent TIPS procedure 12/28/2020 through [facility].  Despite this he still required paracentesis nearly weekly although only has been getting roughly 2.5-4 L off each time.  Underwent paracentesis day PTA on 1/13 with 2.6 L fluid removal.  Previously on nadolol which was discontinued, sometime after TIPS procedure I believe.  He is on Lasix 20 mg twice daily and lactulose 20 g twice daily at baseline.  He has trace-1+ lower extremity edema on exam and still fairly severe distention although abdomen is soft.  Admission bilirubin is 3.2, AST 55, albumin 1.8, and INR 2.0.  -Continue lactulose 20g twice daily, titrate to 3 stools per day  -Give Lasix 20 mg twice daily p.o. today due to his leg swelling    Acute on chronic anemia: Hemoglobin initially at 10.2 which is his baseline.  Significant drop in hemoglobin next morning to 6.5 with repeat 2 hours later up to 7.3.  Asymptomatic from this and blood pressure remains  systolic where it has been.  Did have a bowel movement with lactulose and no red or black discoloration.  I suspect this is mostly dilutional from 1 L NS and 75 g IV albumin he received day 1.  Less suspicious for GI bleed, although still possible given  "esophageal varices and his cirrhosis.  Other source of bleeding would be intra-abdominal from the 2 recent paracentesis although he is having no abdominal pain now and if significant bleeding in this area it should be at least causing some pain.  -Given history of esophageal varices I will start IV Protonix twice daily and octreotide drip empirically until hemoglobin appears more stable  -Hemoglobin remaining in mid 7 range so less likely GI bleed, continue Protonix and octreotide today    HCC: Previous radioembolization of different liver lesions/HCC through [facility] in May 2020.  For follow-up MRIs the lesions were described as nonviable and smaller.  This is likely what is seen on CT scan here.    Hypokalemia: Potassium 3.2.  Potassium, magnesium, phosphorus replacement protocols ordered.    Thrombocytopenia:  Platelet count at baseline 70-100K initially with drop to 37K next day, likely delusional, improving.  -CBC tomorrow    DVT Prophylaxis: Pneumatic Compression Devices  Code Status: Full Code  FEN: 2 g sodium restriction  Discharge Dispo: Home with son who he lives with  Estimated Disch Date / # of Days until Disch: Likely discharge Tuesday afternoon after fifth day ceftriaxone if peritoneal fluid looks okay on repeat paracentesis tomorrow or next day    I saw the patient at the bedside with his son interpreting as he has needed to do in the past.  The patient's dialect is not available through telephone/iPad  services.        Interval History (Subjective):      Afebrile.  Denies any abdominal pain today.  Continues to be worried about his lower extremity swelling.  We will give him Lasix today along with the albumin as it is day 3 for SBP.  Appetite is fair.  No blood seen in his stools.                  Physical Exam:      Last Vital Signs:  /45 (BP Location: Right arm)   Pulse 76   Temp 98.5  F (36.9  C) (Temporal)   Resp 16   Ht 1.626 m (5' 4\")   Wt 57.3 kg (126 lb 6.4 oz)   " SpO2 99%   BMI 21.70 kg/m      Intake/Output Summary (Last 24 hours) at 1/17/2021 1035  Last data filed at 1/17/2021 1012  Gross per 24 hour   Intake 1080.4 ml   Output 275 ml   Net 805.4 ml       Constitutional: Awake, NAD   Eyes: sclera slightly icteric  HEENT:  MMM  Respiratory: no focal crackles or wheeze  Cardiovascular: RRR, high-pitched 3/6 systolic murmur  GI: Moderate to severe distention, but remains soft, bowel sounds present, nontender to palpation  Skin: no rash   Musculoskeletal/extremities: 1+ bilateral lower extremity pitting edema  Neurologic: Alert, follows commands, per son he is not confused  Psychiatric: calm, cooperative          Medications:      All current medications were reviewed with changes reflected in problem list.         Data:      All new lab and imaging data was reviewed.   Labs:  Recent Labs   Lab 01/15/21  0444 01/15/21  0303 01/15/21  0302   CULT Culture negative monitoring continues  Culture negative monitoring continues No growth after 2 days No growth after 2 days     Recent Labs   Lab 01/17/21  0545 01/16/21  1932 01/16/21  1257 01/16/21  0821 01/16/21  0611   WBC 2.4*  --   --  2.4* 2.1*   HGB 7.5* 7.4* 7.7* 7.3* 6.5*   HCT 23.1*  --   --  22.0* 19.9*   MCV 95  --   --  95 95   PLT 52*  --   --  46* 37*     Recent Labs   Lab 01/17/21  0545 01/16/21  0611 01/15/21  2148 01/15/21  0303 01/15/21  0303    140  --   --  138   POTASSIUM 3.3* 3.5 3.6   < > 3.2*   CHLORIDE 114* 113*  --   --  110*   CO2 23 22  --   --  25   ANIONGAP 4 5  --   --  3   * 74  --   --  124*   BUN 6* 8  --   --  7   CR 0.60* 0.58*  --   --  0.70   GFRESTIMATED >90 >90  --   --  >90   GFRESTBLACK >90 >90  --   --  >90   JOHN 7.2* 7.2*  --   --  7.0*   MAG 2.0 2.0  --   --   --    PHOS 1.9* 2.0*  --   --   --    PROTTOTAL 5.3* 4.8*  --   --  6.1*   ALBUMIN 2.1* 2.1*  --   --  1.8*   BILITOTAL 3.6* 4.7*  --   --  3.2*   ALKPHOS 235* 230*  --   --  431*   AST 24 24  --   --  55*   ALT 16 13   --   --  26    < > = values in this interval not displayed.     INR 2.65    Imaging:   None today    Rad Ulrich MD

## 2021-01-17 NOTE — PLAN OF CARE
VSS, afebrile, RA, LS clear. 2g Na diet. Telemetry SR 70-80's. Abdomen soft, mildly tender and distended. 1 BM overnight. Up SBA w/IV pole. Foam dressing CDI. Voiding in adequate amounts. Denies pain. Plans to discharge 1/18 after paracenthesis.

## 2021-01-17 NOTE — PLAN OF CARE
A/O x 4, forgetful.  Son at bedside to interpret.  VSS, afebrile. Denies pain.  Up with SBA ambulating to restroom.  BS hypoactive.  Gas+, 3 loose BM's this shift.  Abdomen soft, non tender. Tolerating diet.  Octreotide infusing.  IV rocephin.  K and Ph replaced, rechecks pending.  Vitamin K given.  Plan is to have paracentesis tomorrow and possible discharge to home on Tuesday.  Will continue to monitor.

## 2021-01-18 ENCOUNTER — APPOINTMENT (OUTPATIENT)
Dept: ULTRASOUND IMAGING | Facility: CLINIC | Age: 81
DRG: 372 | End: 2021-01-18
Attending: INTERNAL MEDICINE
Payer: MEDICARE

## 2021-01-18 LAB
ABO + RH BLD: NORMAL
ABO + RH BLD: NORMAL
ANION GAP SERPL CALCULATED.3IONS-SCNC: 5 MMOL/L (ref 3–14)
APPEARANCE FLD: NORMAL
BLD GP AB SCN SERPL QL: NORMAL
BLD PROD TYP BPU: NORMAL
BLD PROD TYP BPU: NORMAL
BLD UNIT ID BPU: 0
BLOOD BANK CMNT PATIENT-IMP: NORMAL
BLOOD PRODUCT CODE: NORMAL
BPU ID: NORMAL
BUN SERPL-MCNC: 3 MG/DL (ref 7–30)
CALCIUM SERPL-MCNC: 7.2 MG/DL (ref 8.5–10.1)
CHLORIDE SERPL-SCNC: 112 MMOL/L (ref 94–109)
CO2 SERPL-SCNC: 22 MMOL/L (ref 20–32)
COLOR FLD: YELLOW
CREAT SERPL-MCNC: 0.7 MG/DL (ref 0.66–1.25)
ERYTHROCYTE [DISTWIDTH] IN BLOOD BY AUTOMATED COUNT: 15.6 % (ref 10–15)
GFR SERPL CREATININE-BSD FRML MDRD: 90 ML/MIN/{1.73_M2}
GLUCOSE SERPL-MCNC: 118 MG/DL (ref 70–99)
GRAM STN SPEC: NORMAL
HCT VFR BLD AUTO: 21.5 % (ref 40–53)
HGB BLD-MCNC: 6.9 G/DL (ref 13.3–17.7)
INR PPP: 3.13 (ref 0.86–1.14)
LYMPHOCYTES NFR FLD MANUAL: 17 %
MAGNESIUM SERPL-MCNC: 1.8 MG/DL (ref 1.6–2.3)
MCH RBC QN AUTO: 30.7 PG (ref 26.5–33)
MCHC RBC AUTO-ENTMCNC: 32.1 G/DL (ref 31.5–36.5)
MCV RBC AUTO: 96 FL (ref 78–100)
MONOS+MACROS NFR FLD MANUAL: 54 %
NEUTS BAND NFR FLD MANUAL: 29 %
NUM BPU REQUESTED: 1
PHOSPHATE SERPL-MCNC: 2.8 MG/DL (ref 2.5–4.5)
PLATELET # BLD AUTO: 52 10E9/L (ref 150–450)
POTASSIUM SERPL-SCNC: 3.3 MMOL/L (ref 3.4–5.3)
POTASSIUM SERPL-SCNC: 3.4 MMOL/L (ref 3.4–5.3)
RBC # BLD AUTO: 2.25 10E12/L (ref 4.4–5.9)
SODIUM SERPL-SCNC: 139 MMOL/L (ref 133–144)
SPECIMEN EXP DATE BLD: NORMAL
SPECIMEN SOURCE FLD: NORMAL
SPECIMEN SOURCE: NORMAL
TRANSFUSION STATUS PATIENT QL: NORMAL
TRANSFUSION STATUS PATIENT QL: NORMAL
WBC # BLD AUTO: 2 10E9/L (ref 4–11)
WBC # FLD AUTO: 860 /UL

## 2021-01-18 PROCEDURE — 83735 ASSAY OF MAGNESIUM: CPT | Performed by: INTERNAL MEDICINE

## 2021-01-18 PROCEDURE — 99233 SBSQ HOSP IP/OBS HIGH 50: CPT | Performed by: INTERNAL MEDICINE

## 2021-01-18 PROCEDURE — 250N000013 HC RX MED GY IP 250 OP 250 PS 637: Performed by: INTERNAL MEDICINE

## 2021-01-18 PROCEDURE — 250N000009 HC RX 250: Performed by: INTERNAL MEDICINE

## 2021-01-18 PROCEDURE — 250N000011 HC RX IP 250 OP 636: Performed by: INTERNAL MEDICINE

## 2021-01-18 PROCEDURE — 250N000009 HC RX 250: Performed by: RADIOLOGY

## 2021-01-18 PROCEDURE — 80048 BASIC METABOLIC PNL TOTAL CA: CPT | Performed by: INTERNAL MEDICINE

## 2021-01-18 PROCEDURE — 49083 ABD PARACENTESIS W/IMAGING: CPT

## 2021-01-18 PROCEDURE — 258N000003 HC RX IP 258 OP 636: Performed by: INTERNAL MEDICINE

## 2021-01-18 PROCEDURE — 0W9G3ZZ DRAINAGE OF PERITONEAL CAVITY, PERCUTANEOUS APPROACH: ICD-10-PCS | Performed by: RADIOLOGY

## 2021-01-18 PROCEDURE — 87015 SPECIMEN INFECT AGNT CONCNTJ: CPT | Performed by: INTERNAL MEDICINE

## 2021-01-18 PROCEDURE — 85027 COMPLETE CBC AUTOMATED: CPT | Performed by: INTERNAL MEDICINE

## 2021-01-18 PROCEDURE — 87205 SMEAR GRAM STAIN: CPT | Performed by: INTERNAL MEDICINE

## 2021-01-18 PROCEDURE — 84132 ASSAY OF SERUM POTASSIUM: CPT | Performed by: INTERNAL MEDICINE

## 2021-01-18 PROCEDURE — 84100 ASSAY OF PHOSPHORUS: CPT | Performed by: INTERNAL MEDICINE

## 2021-01-18 PROCEDURE — P9016 RBC LEUKOCYTES REDUCED: HCPCS | Performed by: INTERNAL MEDICINE

## 2021-01-18 PROCEDURE — 85610 PROTHROMBIN TIME: CPT | Performed by: INTERNAL MEDICINE

## 2021-01-18 PROCEDURE — 36415 COLL VENOUS BLD VENIPUNCTURE: CPT | Performed by: INTERNAL MEDICINE

## 2021-01-18 PROCEDURE — 87070 CULTURE OTHR SPECIMN AEROBIC: CPT | Performed by: INTERNAL MEDICINE

## 2021-01-18 PROCEDURE — 89051 BODY FLUID CELL COUNT: CPT | Performed by: INTERNAL MEDICINE

## 2021-01-18 PROCEDURE — C9113 INJ PANTOPRAZOLE SODIUM, VIA: HCPCS | Performed by: INTERNAL MEDICINE

## 2021-01-18 PROCEDURE — 120N000001 HC R&B MED SURG/OB

## 2021-01-18 RX ORDER — POTASSIUM CHLORIDE 1500 MG/1
20 TABLET, EXTENDED RELEASE ORAL ONCE
Status: COMPLETED | OUTPATIENT
Start: 2021-01-18 | End: 2021-01-18

## 2021-01-18 RX ORDER — LIDOCAINE HYDROCHLORIDE 10 MG/ML
10 INJECTION, SOLUTION EPIDURAL; INFILTRATION; INTRACAUDAL; PERINEURAL ONCE
Status: COMPLETED | OUTPATIENT
Start: 2021-01-18 | End: 2021-01-18

## 2021-01-18 RX ORDER — FUROSEMIDE 20 MG
20 TABLET ORAL ONCE
Status: COMPLETED | OUTPATIENT
Start: 2021-01-18 | End: 2021-01-18

## 2021-01-18 RX ADMIN — POTASSIUM CHLORIDE 20 MEQ: 1500 TABLET, EXTENDED RELEASE ORAL at 20:05

## 2021-01-18 RX ADMIN — LACTULOSE 20 G: 20 SOLUTION ORAL at 09:59

## 2021-01-18 RX ADMIN — POTASSIUM & SODIUM PHOSPHATES POWDER PACK 280-160-250 MG 1 PACKET: 280-160-250 PACK at 16:38

## 2021-01-18 RX ADMIN — OCTREOTIDE ACETATE 50 MCG/HR: 200 INJECTION, SOLUTION INTRAVENOUS; SUBCUTANEOUS at 00:54

## 2021-01-18 RX ADMIN — PHYTONADIONE 10 MG: 10 INJECTION, EMULSION INTRAMUSCULAR; INTRAVENOUS; SUBCUTANEOUS at 10:00

## 2021-01-18 RX ADMIN — FUROSEMIDE 20 MG: 20 TABLET ORAL at 12:43

## 2021-01-18 RX ADMIN — CEFTRIAXONE 2 G: 2 INJECTION, POWDER, FOR SOLUTION INTRAMUSCULAR; INTRAVENOUS at 16:38

## 2021-01-18 RX ADMIN — PANTOPRAZOLE SODIUM 40 MG: 40 INJECTION, POWDER, FOR SOLUTION INTRAVENOUS at 10:00

## 2021-01-18 RX ADMIN — LIDOCAINE HYDROCHLORIDE 10 ML: 10 INJECTION, SOLUTION EPIDURAL; INFILTRATION; INTRACAUDAL; PERINEURAL at 10:51

## 2021-01-18 RX ADMIN — POTASSIUM & SODIUM PHOSPHATES POWDER PACK 280-160-250 MG 1 PACKET: 280-160-250 PACK at 00:49

## 2021-01-18 RX ADMIN — PANTOPRAZOLE SODIUM 40 MG: 40 INJECTION, POWDER, FOR SOLUTION INTRAVENOUS at 20:05

## 2021-01-18 RX ADMIN — POTASSIUM CHLORIDE 20 MEQ: 1500 TABLET, EXTENDED RELEASE ORAL at 12:18

## 2021-01-18 RX ADMIN — POTASSIUM & SODIUM PHOSPHATES POWDER PACK 280-160-250 MG 1 PACKET: 280-160-250 PACK at 10:00

## 2021-01-18 ASSESSMENT — ACTIVITIES OF DAILY LIVING (ADL)
ADLS_ACUITY_SCORE: 14
ADLS_ACUITY_SCORE: 16

## 2021-01-18 NOTE — PROGRESS NOTES
Pt was in Radiology today for a paracentesis. Procedure performed by Pradip. Procedure was tolerated well, vitals remained stable.Had son on phone for  as his language is not in the ipad for interpretation. 2400 cc's of dark yellow colored fluid removed. Specimen to lab for testing.Pt left department in stable satisfactory condition with technologist. There is no evidence of bleeding upon discharge.

## 2021-01-18 NOTE — PROCEDURES
[facility]    Procedure: Paracentesis.     Date/Time: 1/18/2021 10:48 AM  Performed by: Rosenda Moseley DO  Authorized by: Rosenda Moseley DO     UNIVERSAL PROTOCOL   Site Marked: Yes  Prior Images Obtained and Reviewed:  Yes  Required items: Required blood products, implants, devices and special equipment available    Patient identity confirmed:  Verbally with patient, arm band, provided demographic data and hospital-assigned identification number  Patient was reevaluated immediately before administering moderate or deep sedation or anesthesia  Confirmation Checklist:  Patient's identity using two indicators, relevant allergies, procedure was appropriate and matched the consent or emergent situation and correct equipment/implants were available  Time out: Immediately prior to the procedure a time out was called    Universal Protocol: the Joint Commission Universal Protocol was followed    Preparation: Patient was prepped and draped in usual sterile fashion           ANESTHESIA    Anesthesia: Local infiltration  Local Anesthetic:  Lidocaine 1% without epinephrine      SEDATION    Patient Sedated: No    See dictated procedure note for full details.  Findings: Paracentesis.     Specimens: none    Complications: None    Condition: Stable    PROCEDURE   Patient Tolerance:  Patient tolerated the procedure well with no immediate complications    Length of time physician/provider present for 1:1 monitoring during sedation: 0

## 2021-01-18 NOTE — PLAN OF CARE
Pt A&O x4.forgetful but his son says he is at his baseline. VSS. Son at bedside to interpret for most of night. Up SBA. Voiding. K+ 3.5, recheck in AM. Octreotide gtt and IV rocephin continue. IV Protonix. 2g NA diet, son provides food. Lactulose held, pt already had 3 bm's earlier in day. Denies pain, just some discomfort and bloating in abdomen. Plan for paracentesis tomorrow and possible discharge home on tuesday.

## 2021-01-18 NOTE — PROGRESS NOTES
"GASTROENTEROLOGY PROGRESS NOTE        SUBJECTIVE:  No current abdominal pain.  Passing bowel movements.  Tolerating diet.     OBJECTIVE:    /44 (BP Location: Right arm)   Pulse 82   Temp 98.2  F (36.8  C) (Temporal)   Resp 18   Ht 1.626 m (5' 4\")   Wt 57.3 kg (126 lb 6.4 oz)   SpO2 100%   BMI 21.70 kg/m    Temp (24hrs), Av.2  F (36.8  C), Min:98  F (36.7  C), Max:98.4  F (36.9  C)    Patient Vitals for the past 72 hrs:   Weight   21 0622 57.3 kg (126 lb 6.4 oz)   21 1002 56.8 kg (125 lb 4.8 oz)       Intake/Output Summary (Last 24 hours) at 2021 1040  Last data filed at 2021 0654  Gross per 24 hour   Intake 510.9 ml   Output 1350 ml   Net -839.1 ml        PHYSICAL EXAM     Constitutional: Thin, chronically ill-appearing, no apparent distress    Abdomen: Soft, round, nontender to palpation.         Additional Comments:  ROS, FH, SH: See initial GI consult for details.     I have reviewed the patient's new clinical lab results:     Recent Labs   Lab Test 21  0731 21  0545 21  1932 21  0821 21  0821 01/15/21  0303 01/15/21  0303   WBC 2.0* 2.4*  --   --  2.4*   < > 6.2   HGB 6.9* 7.5* 7.4*   < > 7.3*   < > 10.2*   MCV 96 95  --   --  95   < > 93   PLT 52* 52*  --   --  46*   < > 108*   INR 3.13* 2.65*  --   --   --   --  2.07*    < > = values in this interval not displayed.     Recent Labs   Lab Test 21  0731 21  1417 21  0545 21  0611   POTASSIUM 3.4 3.5 3.3* 3.5   CHLORIDE 112*  --  114* 113*   CO2 22  --  23 22   BUN 3*  --  6* 8   ANIONGAP 5  --  4 5     Recent Labs   Lab Test 21  0545 21  0611 01/15/21  0526 01/15/21  0303 16  0330 04/04/16  0330 09/18/15  1828 09/18/15  1828 09/18/15  1821   ALBUMIN 2.1* 2.1*  --  1.8*   < >  --    < >  --  3.4   BILITOTAL 3.6* 4.7*  --  3.2*   < >  --    < >  --  0.4   ALT 16 13  --  26   < >  --    < >  --  32   AST 24 24  --  55*   < >  --    < >  --  31   PROTEIN  -- "   --  Negative  --   --  Negative  --  Negative  --    LIPASE  --   --   --   --   --   --   --   --  233    < > = values in this interval not displayed.        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    1.  CHASE cirrhosis / history of HCC: Complicated by portal hypertension with ascites.  No evidence of active GI bleeding.  He continues on PPI and octreotide.  --TIPS procedure 12/28/2020 unfortunately still requiring weekly paracentesis.  --Last paracentesis 1/13 2.6 L removed.  Previously on nadolol which was discontinued after the TIPS procedure.  Positive SBP, currently on IV antibiotics.  --Tubes patent on ultrasound.  --Continues on Lasix  --Continue with lactulose, goal 3-4 bowel movements per day  --Consider repeat paracentesis tomorrow    Discussed with Dr. Linda.  Nisreen Mena PA-C  [facility] ( [facility])

## 2021-01-18 NOTE — PROVIDER NOTIFICATION
DATE:  1/18/2021   TIME OF RECEIPT FROM LAB:  0840  LAB TEST:  HGB  LAB VALUE:  6.9  RESULTS GIVEN WITH READ-BACK TO (PROVIDER): Web base paged Dr. Ulrich  TIME LAB VALUE REPORTED TO PROVIDER:   0845      New orders placed for blood transfusion

## 2021-01-18 NOTE — PLAN OF CARE
VSS, afebrile, RA, LS clear, dim bases, dyspnea on exertion noted w/ambulating to bathroom. 2g Na diet. Abdomen soft, mildly tender and distended. 1 BM overnight. Up SBA w/IV pole. Foam dressing on L elbow changed, CDI. Voiding in adequate amounts. Denies pain. Phosphorus 1.9, replaced per protocol. Octreotide infusing 10mL/hr. Plans to discharge 1/18 or 1/19 after paracenthesis

## 2021-01-18 NOTE — PROGRESS NOTES
[facility]  Hospitalist Progress Note  Rad Ulrich MD 01/18/21    Reason for Stay (Diagnosis): SBP         Assessment and Plan:      Summary of Stay: Robb Vallejo is a 80 year old Tigrinya speaking male with history of cirrhosis secondary to WOODWARD receiving weekly paracentesis and is now s/p TIPS procedure last month, HCC s/p radio embolization, anemia, and thrombocytopenia who was admitted on 1/15/2021  after presenting with fevers and chills of less than 24-hour duration.  Also, reports weakness for 2 days.  He underwent paracentesis with 2.6 L fluid removal day prior to presentation.  Febrile here initially along with elevated lactic acid.  CT chest/abdomen/pelvis with IV contrast was done which showed no sign of PE, small bilateral pleural effusions, minimal pericardial effusion, cirrhosis with large volume ascites, and some subtle thickening of the small bowel and colon which is likely from venous congestion.  Diagnostic paracentesis done in the ER which is consistent with SBP.  Received IV vancomycin and cefepime now narrowed to ceftriaxone.  Receiving IV albumin for SBP.  Consulted [facility].  Significant drop in hemoglobin, however no evidence for active bleeding and he is asymptomatic.  Suspect dilutional from 75 g IV albumin 1 L normal saline.  Empirically starting twice daily IV Protonix and octreotide drip just in case given history of esophageal varices.  2.4 liters fluid removal with paracentesis today.  Hemoglobin under 7 so transfusing 1 unit RBCs, no sign of bleeding.    Problem List/Assessment and Plan:   SBP: Presenting with fevers and chills of 1 day duration along with some redness around area of previous paracentesis site and lower abdominal pain.  Has been relatively weak for the last 2 days, but son denies any confusion.  Febrile initially at 100.8  F, no leukocytosis, slight tachycardia.  Lactic acid elevated at 2.8 which normalized after 500 mL normal saline.   Diagnostic paracentesis done in the ER which shows 5290 WBCs with 79% neutrophils consistent with SBP.  He did receive vancomycin and cefepime in the ER.  -Ultrasound TIPS with Doppler was done given his ongoing significant ascites which showed patent antegrade flow and flow velocities ranging from  cm/s  -IV ceftriaxone 2 g every 24 hours for SBP, plan for 5-day course  -IV albumin for SBP, received 75 g 25% 1/15 and 50 g IV day 3  -intake and output, daily weight  -Consulted [facility], appreciate recommendations  -2.4 L fluid removal with paracentesis today.  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  He has cirrhosis with significant ascites requiring weekly paracentesis.  Underwent TIPS procedure 12/28/2020 through [facility].  Despite this he still required paracentesis nearly weekly although only has been getting roughly 2.5-4 L off each time.  Underwent paracentesis day PTA on 1/13 with 2.6 L fluid removal.  Previously on nadolol which was discontinued, sometime after TIPS procedure I believe.  He is on Lasix 20 mg twice daily and lactulose 20 g twice daily at baseline.  He has trace-1+ lower extremity edema on exam and still fairly severe distention although abdomen is soft.  Admission bilirubin is 3.2, AST 55, albumin 1.8, and INR 2.0.  -Continue lactulose 20g twice daily, titrate to 3 stools per day  -Give Lasix 20 mg p.o. today with transfusion  -INR went up from 2.6 to 3.1 after 10 mg oral vitamin K.    Acute on chronic anemia: Hemoglobin initially at 10.2 which is his baseline.  Significant drop in hemoglobin next morning to 6.5 with repeat 2 hours later up to 7.3.  Asymptomatic from this and blood pressure remains  systolic where it has been.  Did have a bowel movement with lactulose and no red or black discoloration.  I suspect this is mostly dilutional from 1 L NS and 75 g IV albumin he received day 1.  Less suspicious for GI bleed,  although still possible given esophageal varices and his cirrhosis.  Other source of bleeding would be intra-abdominal from the 2 recent paracentesis although he is having no abdominal pain now and if significant bleeding in this area it should be at least causing some pain.  -Given history of esophageal varices  he was empirically started onIV Protonix twice daily and octreotide drip empirically until hemoglobin appears more stable  -Hemoglobin 6.9 after albumin infusion yesterday, likely mostly delusional but given it is persistently low we will transfuse 1 unit RBCs today and give 20 mg oral Lasix with this    HCC: Previous radioembolization of different liver lesions/HCC through [facility] in May 2020.  For follow-up MRIs the lesions were described as nonviable and smaller.  This is likely what is seen on CT scan here.    Hypokalemia, hypophosphatemia: Potassium 3.2.  Potassium, magnesium, phosphorus replacement protocols ordered.    Pancytopenia secondary to cirrhosis: WBC 2.0, hemoglobin 6.9 today, platelet count 52.  Suspect is primarily secondary to cirrhosis.      DVT Prophylaxis: Pneumatic Compression Devices  Code Status: Full Code  FEN: 2 g sodium restriction  Discharge Dispo: Home with son who he lives with  Estimated Disch Date / # of Days until Disch: Pending labs tomorrow anticipate discharge after fifth dose of IV ceftriaxone in the afternoon    I saw the patient at the bedside with his son interpreting via speaker phone as he has needed to do in the past.  The patient's dialect is not available through telephone/iPad  services.        Interval History (Subjective):      Afebrile.  Feels ok.  No abdominal pain.  Having ongoing leg swelling.  Good UOP with 1.3 L with oral diuretics yesterday.  Paracentesis today with 2.4 L fluid removal.  Hemoglobin down to 6.9 today.  No lightheadedness or shortness of breath.  Transfusing 1 unit RBCs.                  Physical Exam:      Last Vital  "Signs:  /51 (BP Location: Left arm)   Pulse 77   Temp 98.2  F (36.8  C) (Temporal)   Resp 18   Ht 1.626 m (5' 4\")   Wt 57.3 kg (126 lb 6.4 oz)   SpO2 100%   BMI 21.70 kg/m      Intake/Output Summary (Last 24 hours) at 1/18/2021 1202  Last data filed at 1/18/2021 0654  Gross per 24 hour   Intake 510.9 ml   Output 1350 ml   Net -839.1 ml       Constitutional: Awake, NAD   Eyes: sclera slightly icteric  HEENT:  MMM  Respiratory: no focal crackles or wheeze, on room air  Cardiovascular: RRR, high-pitched 3/6 systolic murmur  GI: Moderate to severe distention, soft, bowel sounds present, nontender to palpation  Skin: no rash   Musculoskeletal/extremities: 1+ bilateral lower extremity pitting edema  Neurologic: Alert, follows commands, per son he is not confused  Psychiatric: calm, cooperative          Medications:      All current medications were reviewed with changes reflected in problem list.         Data:      All new lab and imaging data was reviewed.   Labs:  Recent Labs   Lab 01/15/21  0444 01/15/21  0303 01/15/21  0302   CULT Culture negative monitoring continues  Culture negative monitoring continues No growth after 3 days No growth after 3 days     Recent Labs   Lab 01/18/21  0731 01/17/21  0545 01/16/21  1932 01/16/21  0821 01/16/21  0821   WBC 2.0* 2.4*  --   --  2.4*   HGB 6.9* 7.5* 7.4*   < > 7.3*   HCT 21.5* 23.1*  --   --  22.0*   MCV 96 95  --   --  95   PLT 52* 52*  --   --  46*    < > = values in this interval not displayed.     Recent Labs   Lab 01/18/21  0731 01/17/21  1946 01/17/21  1417 01/17/21  0545 01/16/21  0611 01/15/21  0303 01/15/21  0303     --   --  141 140  --  138   POTASSIUM 3.4  --  3.5 3.3* 3.5   < > 3.2*   CHLORIDE 112*  --   --  114* 113*  --  110*   CO2 22  --   --  23 22  --  25   ANIONGAP 5  --   --  4 5  --  3   *  --   --  140* 74  --  124*   BUN 3*  --   --  6* 8  --  7   CR 0.70  --   --  0.60* 0.58*  --  0.70   GFRESTIMATED 90  --   --  >90 >90  -- "  >90   GFRESTBLACK >90  --   --  >90 >90  --  >90   JOHN 7.2*  --   --  7.2* 7.2*  --  7.0*   MAG 1.8  --   --  2.0 2.0  --   --    PHOS 2.8 2.0*  --  1.9* 2.0*   < >  --    PROTTOTAL  --   --   --  5.3* 4.8*  --  6.1*   ALBUMIN  --   --   --  2.1* 2.1*  --  1.8*   BILITOTAL  --   --   --  3.6* 4.7*  --  3.2*   ALKPHOS  --   --   --  235* 230*  --  431*   AST  --   --   --  24 24  --  55*   ALT  --   --   --  16 13  --  26    < > = values in this interval not displayed.     INR 3.13    Imaging:   None today    Rad Ulrich MD

## 2021-01-19 VITALS
HEIGHT: 64 IN | SYSTOLIC BLOOD PRESSURE: 100 MMHG | RESPIRATION RATE: 16 BRPM | WEIGHT: 125.2 LBS | OXYGEN SATURATION: 99 % | TEMPERATURE: 98.1 F | HEART RATE: 75 BPM | BODY MASS INDEX: 21.37 KG/M2 | DIASTOLIC BLOOD PRESSURE: 50 MMHG

## 2021-01-19 LAB
ALBUMIN SERPL-MCNC: 2.2 G/DL (ref 3.4–5)
ALP SERPL-CCNC: 189 U/L (ref 40–150)
ALT SERPL W P-5'-P-CCNC: 14 U/L (ref 0–70)
ANION GAP SERPL CALCULATED.3IONS-SCNC: 3 MMOL/L (ref 3–14)
AST SERPL W P-5'-P-CCNC: 23 U/L (ref 0–45)
BILIRUB SERPL-MCNC: 2.7 MG/DL (ref 0.2–1.3)
BUN SERPL-MCNC: 3 MG/DL (ref 7–30)
CALCIUM SERPL-MCNC: 7.1 MG/DL (ref 8.5–10.1)
CHLORIDE SERPL-SCNC: 112 MMOL/L (ref 94–109)
CO2 SERPL-SCNC: 23 MMOL/L (ref 20–32)
CREAT SERPL-MCNC: 0.69 MG/DL (ref 0.66–1.25)
ERYTHROCYTE [DISTWIDTH] IN BLOOD BY AUTOMATED COUNT: 15.5 % (ref 10–15)
GFR SERPL CREATININE-BSD FRML MDRD: >90 ML/MIN/{1.73_M2}
GLUCOSE SERPL-MCNC: 153 MG/DL (ref 70–99)
HCT VFR BLD AUTO: 25.5 % (ref 40–53)
HGB BLD-MCNC: 8.2 G/DL (ref 13.3–17.7)
MAGNESIUM SERPL-MCNC: 1.8 MG/DL (ref 1.6–2.3)
MCH RBC QN AUTO: 30.7 PG (ref 26.5–33)
MCHC RBC AUTO-ENTMCNC: 32.2 G/DL (ref 31.5–36.5)
MCV RBC AUTO: 96 FL (ref 78–100)
PHOSPHATE SERPL-MCNC: 2.2 MG/DL (ref 2.5–4.5)
PLATELET # BLD AUTO: 65 10E9/L (ref 150–450)
POTASSIUM SERPL-SCNC: 3.8 MMOL/L (ref 3.4–5.3)
POTASSIUM SERPL-SCNC: 3.8 MMOL/L (ref 3.4–5.3)
PROT SERPL-MCNC: 5 G/DL (ref 6.8–8.8)
RBC # BLD AUTO: 2.67 10E12/L (ref 4.4–5.9)
SODIUM SERPL-SCNC: 138 MMOL/L (ref 133–144)
WBC # BLD AUTO: 3 10E9/L (ref 4–11)

## 2021-01-19 PROCEDURE — 80053 COMPREHEN METABOLIC PANEL: CPT | Performed by: INTERNAL MEDICINE

## 2021-01-19 PROCEDURE — 36415 COLL VENOUS BLD VENIPUNCTURE: CPT | Performed by: INTERNAL MEDICINE

## 2021-01-19 PROCEDURE — 83735 ASSAY OF MAGNESIUM: CPT | Performed by: INTERNAL MEDICINE

## 2021-01-19 PROCEDURE — 84100 ASSAY OF PHOSPHORUS: CPT | Performed by: INTERNAL MEDICINE

## 2021-01-19 PROCEDURE — C9113 INJ PANTOPRAZOLE SODIUM, VIA: HCPCS | Performed by: INTERNAL MEDICINE

## 2021-01-19 PROCEDURE — 85027 COMPLETE CBC AUTOMATED: CPT | Performed by: INTERNAL MEDICINE

## 2021-01-19 PROCEDURE — 250N000013 HC RX MED GY IP 250 OP 250 PS 637: Performed by: INTERNAL MEDICINE

## 2021-01-19 PROCEDURE — 250N000011 HC RX IP 250 OP 636: Performed by: INTERNAL MEDICINE

## 2021-01-19 PROCEDURE — 99239 HOSP IP/OBS DSCHRG MGMT >30: CPT | Performed by: INTERNAL MEDICINE

## 2021-01-19 RX ORDER — CEFTRIAXONE 2 G/1
2 INJECTION, POWDER, FOR SOLUTION INTRAMUSCULAR; INTRAVENOUS EVERY 24 HOURS
Status: COMPLETED | OUTPATIENT
Start: 2021-01-19 | End: 2021-01-19

## 2021-01-19 RX ORDER — FUROSEMIDE 20 MG
20 TABLET ORAL
Status: DISCONTINUED | OUTPATIENT
Start: 2021-01-19 | End: 2021-01-19 | Stop reason: HOSPADM

## 2021-01-19 RX ORDER — CIPROFLOXACIN 500 MG/1
500 TABLET, FILM COATED ORAL DAILY
Qty: 30 TABLET | Refills: 0 | Status: SHIPPED | OUTPATIENT
Start: 2021-01-19 | End: 2021-04-23

## 2021-01-19 RX ORDER — SODIUM CHLORIDE 9 MG/ML
INJECTION, SOLUTION INTRAVENOUS CONTINUOUS
Status: DISCONTINUED | OUTPATIENT
Start: 2021-01-19 | End: 2021-01-19 | Stop reason: HOSPADM

## 2021-01-19 RX ADMIN — CEFTRIAXONE 2 G: 2 INJECTION, POWDER, FOR SOLUTION INTRAMUSCULAR; INTRAVENOUS at 14:05

## 2021-01-19 RX ADMIN — FUROSEMIDE 20 MG: 20 TABLET ORAL at 09:38

## 2021-01-19 RX ADMIN — PANTOPRAZOLE SODIUM 40 MG: 40 INJECTION, POWDER, FOR SOLUTION INTRAVENOUS at 09:38

## 2021-01-19 ASSESSMENT — MIFFLIN-ST. JEOR: SCORE: 1198.9

## 2021-01-19 ASSESSMENT — ACTIVITIES OF DAILY LIVING (ADL)
ADLS_ACUITY_SCORE: 14

## 2021-01-19 NOTE — PLAN OF CARE
Orientation: Alert and oriented x4  VSS. 100% on RA, afebrile this shift.   Tele:NA  LS: Clear  GI: 2 soft stools overnight. Abdomen round, soft at this time. Refused lactulose last night  : Adequate urine output.   Skin: Left elbow skin tear covered with mepilex, C/D/I. Bruising/petechiae throughout skin. +2BLE  Activity: SBA with IV pole, refuses gait belt. Pt slept comfortably throughout mostshift.   Pain: Denies pain  Plan: Potassium replaced last night, recheck was 3.8, also on mag/phos protocol, will recheck both in AM. Octreotide running at 10ml/hr, new IV put in last evening in left lower forearm. Plan is to possibly discharge back home with son after dose of rocephin, so late afternoon if able to discharge. GI following, continue with current cares.

## 2021-01-19 NOTE — PLAN OF CARE
A&Ox4, forgetful at times but also speaks tigrinya and son is interpreting for us via phone. VSS ex softer BP's. 1 unit of blood transfused after paracentesis. IV infiltrated and new placed. Bandaid over paracentesis site, scant drainage noted to area. Shower done today. Plans to discharge home hopefully tomorrow after antibiotic. Re check mag, phos in AM. Replaced potassium once this shift, recheck at 1630. Edema noted to BLE feet. Umbilical hernia present. Pt appears uncomfortable at times but no prn medications ordered for pain management. Has an abd binder for support.

## 2021-01-19 NOTE — PLAN OF CARE
Pts son arrived to pick patient up at 1550. This staff reviewed the AVS with the son and the son signed for and was given the filled script of cipro. Patient was assisted to the sons car via WC and was discharged home at 1615. Day shift removed the patients IV. Day shift also packed the patient up for discharge. Son helped pt finished getting dressed prior to discharge.

## 2021-01-19 NOTE — PLAN OF CARE
"A&Ox4. Tigrinya speaking, son is interpreting via phone. Denies pain today and states \"feeling better\". IV rocephin last dose this afternoon and then discharging home with son. Voiding well. No BM's this shift but declined lactulose. 2gm sodium diet but son is providing food. AVS to be done when son arrives for discharge. Antibiotic for discharge.   "

## 2021-01-19 NOTE — DISCHARGE SUMMARY
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  Also, reports weakness for 2 days.  He underwent paracentesis with 2.6 L fluid removal day prior to presentation.  Febrile here initially along with elevated lactic acid.  CT chest/abdomen/pelvis with IV contrast was done which showed no sign of PE, small bilateral pleural effusions, minimal pericardial effusion, cirrhosis with large volume ascites, and some subtle thickening of the small bowel and colon which is likely from venous congestion.  Diagnostic paracentesis done in the ER which is consistent with SBP.  Received IV vancomycin and cefepime now narrowed to ceftriaxone.  Receiving IV albumin for SBP.  Consulted [facility].  Significant drop in hemoglobin, however no evidence for active bleeding and he is asymptomatic.  Suspect dilutional from 75 g IV albumin 1 L normal saline.  Empirically starting twice daily IV Protonix and octreotide drip just in case given history of esophageal varices.  2.4 liters fluid removal with paracentesis 1/18, fluid has significant less WBCs.  Hemoglobin under 7 so transfused 1 unit RBCs on 1/18.  Hemoglobin now above 9.  No sign  of bleeding.  Discharging this afternoon after 5 days IV ceftriaxone.  GI recommending oral ciprofloxacin 500 mg daily for SBP prophylaxis.  Follow-up with primary care doctor next week with CMP and CBC.  Follow-up with his hepatologist to be arranged.     Problem List/Assessment and Plan:   SBP: Presenting with fevers and chills of 1 day duration along with some redness around area of previous paracentesis site and lower abdominal pain.  Has been relatively weak for the last 2 days, but son denies any confusion.  Febrile initially at 100.8  F, no leukocytosis, slight tachycardia.  Lactic acid elevated at 2.8 which normalized after 500 mL normal saline.  Diagnostic paracentesis done in the ER which shows 5290 WBCs with 79% neutrophils consistent with SBP.  He did receive vancomycin and cefepime in the ER.  -Ultrasound TIPS with Doppler was done given his ongoing significant ascites which showed patent antegrade flow and flow velocities ranging from  cm/s  -Finished a 5-day course of IV ceftriaxone here  -IV albumin for SBP, received 75 g 25% 1/15 and 50 g IV day 3  -Consulted [facility], appreciate recommendations  -2.4 L fluid removal with repeat paracentesis 1/18.  Significantly less WBCs  -GI recommending oral ciprofloxacin 500 mg daily for SBP prophylaxis.     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  He has cirrhosis with significant ascites requiring weekly paracentesis.  Underwent TIPS procedure 12/28/2020 through [facility].  Despite this he still required paracentesis nearly weekly although only has been getting roughly 2.5-4 L off each time.  Underwent paracentesis day PTA on 1/13 with 2.6 L fluid removal.  Previously on nadolol which was discontinued, sometime after TIPS procedure I believe.  He is on Lasix 20 mg twice daily and lactulose 20 g twice daily at baseline.  He has trace-1+ lower extremity edema on exam and still fairly severe distention although  abdomen is soft.  Admission bilirubin is 3.2, AST 55, albumin 1.8, and INR 2.0.  -Continue lactulose 20g twice daily, titrate to 3 stools per day  -INR went up from 2.6 to 3.1 after 10 mg oral vitamin K  -Resumed home Lasix on discharge  -Follow-up with his hepatologist to be arranged     Acute on chronic anemia: Hemoglobin initially at 10.2 which is his baseline.  Significant drop in hemoglobin next morning to 6.5 with repeat 2 hours later up to 7.3.  Asymptomatic from this and blood pressure remains  systolic where it has been.  Did have a bowel movement with lactulose and no red or black discoloration.  I suspect this is mostly dilutional from 1 L NS and 75 g IV albumin he received day 1.  Less suspicious for GI bleed, although still possible given esophageal varices and his cirrhosis.  Other source of bleeding would be intra-abdominal from the 2 recent paracentesis although he is having no abdominal pain now and if significant bleeding in this area it should be at least causing some pain.  -Given history of esophageal varices  he was empirically started on IV Protonix twice daily and octreotide drip empirically.  Although hemoglobin down slightly with only shifting with albumin no sign of bleeding.  -Hemoglobin above9 after 1 unit RBCs transfusion on 1/18  -CBC with PCP in 1 week     HCC: Previous radioembolization of different liver lesions/HCC through [facility] in May 2020.  For follow-up MRIs the lesions were described as nonviable and smaller.  This is likely what is seen on CT scan here.     Hypokalemia, hypophosphatemia: Potassium 3.2.  Potassium, magnesium, phosphorus replacement protocols ordered while here.     Pancytopenia secondary to cirrhosis: WBC 2.0, hemoglobin 6.9 at neida, platelet count 52.  Suspect is primarily secondary to cirrhosis.       Discharge Disposition:  Discharged to home     Allergies:  Allergies   Allergen Reactions     Penicillins Other (See Comments)     Syncope       "    Discharge Medications:   Current Discharge Medication List      START taking these medications    Details   ciprofloxacin (CIPRO) 500 MG tablet Take 1 tablet (500 mg) by mouth daily For SBP prophylaxis  Qty: 30 tablet, Refills: 0    Associated Diagnoses: SBP (spontaneous bacterial peritonitis) (H)         CONTINUE these medications which have NOT CHANGED    Details   ferrous sulfate (FEROSUL) 325 (65 Fe) MG tablet Take 325 mg by mouth daily (with breakfast)      furosemide (LASIX) 20 MG tablet Take 20 mg by mouth 2 times daily       lactulose (CONSTULOSE) 10 GM/15ML solution TAKE 30ML BY ORAL ROUTE 2 TIMES EVERY DAY              Condition on Discharge:  Discharge condition: Stable   Discharge vitals: Blood pressure 106/47, pulse 79, temperature 98.8  F (37.1  C), temperature source Temporal, resp. rate 18, height 1.626 m (5' 4\"), weight 56.8 kg (125 lb 3.2 oz), SpO2 98 %.   Code status on discharge: Full Code     History of Illness:  See detailed admission note for full details.    Physical Exam:  Blood pressure 106/47, pulse 79, temperature 98.8  F (37.1  C), temperature source Temporal, resp. rate 18, height 1.626 m (5' 4\"), weight 56.8 kg (125 lb 3.2 oz), SpO2 98 %.  Wt Readings from Last 1 Encounters:   01/19/21 56.8 kg (125 lb 3.2 oz)     Constitutional: Awake, NAD   Eyes:  Sclera white  HEENT:  MMM  Respiratory: no focal crackles or wheeze   Cardiovascular: RRR, high-pitched 3/6 systolic murmur  GI: severe distention, soft, bowel sounds present, nontender to palpation  Skin: no rash   Musculoskeletal/extremities: 1+ bilateral lower extremity pitting edema  Neurologic: Alert, follows commands, per son he is not confused  Psychiatric: calm, cooperative     Procedures other than Imaging:  Paracentesis x 2     Imaging:  Results for orders placed or performed during the hospital encounter of 01/15/21   XR Chest Port 1 View    Narrative    EXAM: XR CHEST PORT 1 VW  LOCATION: [facility]  DATE/TIME: " 1/15/2021 3:27 AM    INDICATION: Difficulty breathing.  COMPARISON: 04/04/2016.      Impression    IMPRESSION: Relatively low lung volumes. Mild interstitial prominence. Questionable small area of infiltrate of the lower right lung. Mild atelectasis or scarring at the left base. Normal heart size. Calcified plaque of the aortic arch. No pleural fluid   or pneumothorax.   CT Chest (PE) Abdomen Pelvis w Contrast    Narrative    EXAM: CT CHEST PE ABDOMEN PELVIS W CONTRAST  LOCATION: [facility]  DATE/TIME: 1/15/2021 3:59 AM    INDICATION: Sepsis, shortness of breath.    COMPARISON: None.    TECHNIQUE: CT chest pulmonary angiogram and routine CT abdomen pelvis with IV contrast. Arterial phase through the chest and venous phase through the abdomen and pelvis. Multiplanar reformats and MIP reconstructions were performed. Dose reduction   techniques were used.     CONTRAST: 100 mL [id number].    FINDINGS:  ANGIOGRAM CHEST: Pulmonary arteries are normal caliber and negative for pulmonary emboli. Thoracic aorta is negative for dissection. No CT evidence of right heart strain.     LUNGS AND PLEURA: No acute infiltrates or consolidation. Mild scattered subpleural scarring and/or atelectasis in the lungs. Central airways are clear. Small bilateral pleural effusions, right greater than left.    MEDIASTINUM/AXILLAE: No hilar or mediastinal adenopathy. Normal heart size. Minimal pericardial effusion. Normal caliber thoracic aorta. Coronary artery calcification. Esophagus is grossly negative. Question some ill-defined asymmetric soft tissue   fullness incompletely visualized in the right lower neck. Clinical correlation in this area. Axillary regions are unremarkable.    HEPATOBILIARY: Cirrhotic configuration of the liver with diffuse nodular contour and areas of parenchymal atrophy. Ill-defined area of heterogeneous enhancement in the right hepatic lobe superiorly along with a few areas of indeterminate  low-attenuation   nodularity elsewhere in the right hepatic lobe. For example, there is an indeterminate 2.0 cm low-attenuation lesion in the right hepatic lobe posteriorly on image 60. These are indeterminate. Review of the patient's history indicates that the patient   has a history of hepatocellular carcinoma. No recent prior studies are available for comparison. TIPS shunt catheter in place in the right hepatic lobe; this appears patent. Gallbladder is either absent or contracted. There may be a couple gallstones   within a contracted gallbladder. No biliary dilatation.    PANCREAS: Normal.    SPLEEN: Spleen is enlarged measuring up to 15 cm in length compatible with portal venous hypertension.    ADRENAL GLANDS: Normal.    KIDNEYS/BLADDER: There are several small benign-appearing bilateral renal cysts with no follow-up needed. Kidneys are otherwise negative. Excreted contrast material in the renal collecting systems and bladder. No hydronephrosis.    BOWEL: Stomach is grossly negative. Bowel is normal in caliber with no evidence for obstruction. There is some subtle generalized mild wall thickening involving both the small bowel and colon. This is favored to be secondary to underlying passive venous   congestion and portal venous hypertension rather than enteritis or colitis. Clinical correlation. Normal appendix.    LYMPH NODES: Nonspecific borderline to mildly enlarged bilateral groin lymph nodes are nonspecific.    VASCULATURE: Normal caliber abdominal aorta. Numerous varices in the upper abdomen compatible with portal venous hypertension.    PELVIC ORGANS: Prostatic enlargement.    ADDITIONAL FINDINGS: Large amount of ascites throughout the abdomen and pelvis. Generalized subcutaneous edema. Moderate-sized umbilical hernia containing ascites.    MUSCULOSKELETAL: Mild scattered degenerative changes in the spine.      Impression    IMPRESSION:  1.  Negative for pulmonary embolism.    2.  No evidence for  pneumonitis.    3.  Small bilateral pleural effusions, right greater than left.    4.  Minimal pericardial effusion.    5.  There is some questionable ill-defined asymmetric soft tissue fullness incompletely visualized in the right lower neck. Clinical correlation in this area and follow-up as clinically warranted.    6.  Cirrhotic configuration of the liver with a patent TIPS shunt catheter in the right hepatic lobe. However, there is some ill-defined heterogeneous enhancement as well as some indeterminate low-attenuation lesions in the right hepatic lobe. Patient   has a history of hepatocellular carcinoma per review of the chart. However, no recent comparison studies are available. Clinical correlation and correlation with any recent prior studies would be helpful.    7.  Evidence of portal venous hypertension with splenic enlargement, numerous varices in the abdomen, and a large amount of ascites throughout the abdomen and pelvis.    8.  There is some subtle diffuse wall thickening involving both small bowel and colon. This is favored to be more likely secondary to passive venous congestion in the setting of portal venous hypertension, however clinical correlation is recommended to   exclude any signs of enteritis or colitis which is felt to be less likely.    9.  Prostatic enlargement.    10.  Moderate-sized umbilical hernia contains ascites.     US TIPSS Doppler    Narrative    US TIPSS DOPPLER   1/15/2021 9:07 AM    INDICATION: Hepatic cirrhosis. TIPS placement. Recurrence ascites.    COMPARISON: No pertinent comparison study is available for review.    FINDINGS:   The TIPS is patent with antegrade flow and flow velocities ranging  from  cm/s. No discrete stenosis is identified. A large amount  of intra-abdominal ascites is present.    DONTRELL ARNOLD MD   US Paracentesis    Narrative    US PARACENTESIS 1/18/2021 11:09 AM     HISTORY: HIGH VOLUME paracentesis with or without diagnostic fluid  analysis  with labs to be drawn if ordered. Total paracentesis volume  as much as possible.    FINDINGS: Limited preprocedure ultrasound was performed, images show a  sufficient amount of ascites for paracentesis. An image was archived.  Written and oral informed consent was obtained. A pause for the cause  procedure to verify the correct patient and correct procedure. The  skin overlying the right lower quadrant was prepped and draped in the  usual sterile fashion. The subcutaneous tissues were anesthetized with  10 mL 1% lidocaine. Under direct ultrasound guidance the catheter was  advanced into the peritoneal space and 2.4 L of  straw colored fluid  was drained. The catheter was removed and a sterile dressing was  applied. There were no immediate complications. Ultrasound images were  permanently stored.  Patient left the ultrasound suite in satisfactory  condition.      Impression    IMPRESSION: Technically successful paracentesis without immediate  complications.    DARWIN BROCK DO        Consultations:  Consultation during this admission received from gastroenterology.       Recent Lab Results:  Recent Labs   Lab 01/19/21  0721 01/18/21  0731 01/17/21  0545   WBC 3.0* 2.0* 2.4*   HGB 8.2* 6.9* 7.5*   HCT 25.5* 21.5* 23.1*   MCV 96 96 95   PLT 65* 52* 52*     Recent Labs   Lab 01/18/21  1045 01/15/21  0444 01/15/21  0303 01/15/21  0302   CULT Culture negative monitoring continues Culture negative monitoring continues  Culture negative monitoring continues No growth after 4 days No growth after 4 days     Recent Labs   Lab 01/19/21  0721 01/18/21  2353 01/18/21  1627 01/18/21  0731 01/17/21  1946 01/17/21  0545 01/17/21  0545 01/16/21  0611     --   --  139  --   --  141 140   POTASSIUM 3.8 3.8 3.3* 3.4  --    < > 3.3* 3.5   CHLORIDE 112*  --   --  112*  --   --  114* 113*   CO2 23  --   --  22  --   --  23 22   ANIONGAP 3  --   --  5  --   --  4 5   *  --   --  118*  --   --  140* 74   BUN 3*  --   --   3*  --   --  6* 8   CR 0.69  --   --  0.70  --   --  0.60* 0.58*   GFRESTIMATED >90  --   --  90  --   --  >90 >90   GFRESTBLACK >90  --   --  >90  --   --  >90 >90   JOHN 7.1*  --   --  7.2*  --   --  7.2* 7.2*   MAG 1.8  --   --  1.8  --   --  2.0 2.0   PHOS 2.2*  --   --  2.8 2.0*  --  1.9* 2.0*   PROTTOTAL 5.0*  --   --   --   --   --  5.3* 4.8*   ALBUMIN 2.2*  --   --   --   --   --  2.1* 2.1*   BILITOTAL 2.7*  --   --   --   --   --  3.6* 4.7*   ALKPHOS 189*  --   --   --   --   --  235* 230*   AST 23  --   --   --   --   --  24 24   ALT 14  --   --   --   --   --  16 13    < > = values in this interval not displayed.     Recent Labs   Lab 01/18/21  0731 01/17/21  0545 01/15/21  0303   INR 3.13* 2.65* 2.07*       Peritoneal fluid from 1/15:  5290 WBCs, 79% neutrophils    Peritoneal fluid from 1/18:  860 WBCs, 29% neutrophils       Pending Results:    Unresulted Labs Ordered in the Past 30 Days of this Admission     Date and Time Order Name Status Description    1/17/2021 1039 Fluid Culture Aerobic Bacterial Preliminary     1/15/2021 0444 Anaerobic bacterial culture Preliminary     1/15/2021 0440 Fluid Culture Aerobic Bacterial Preliminary     1/15/2021 0310 Blood culture Preliminary     1/15/2021 0310 Blood culture Preliminary          These results will be followed up by patient's primary care provider.    Discharge Instructions and Follow-Up:   Discharge Procedure Orders   Activity   Order Comments: Your activity upon discharge: activity as tolerated     Order Specific Question Answer Comments   Is discharge order? Yes      Reason for your hospital stay   Order Comments: You were hospitalized for fevers and weakness and found to have SBP and infection of your abdominal fluid.  You received a 5-day course of IV antibiotics for this and your repeat paracentesis fluid looked improved.  Your blood counts were lower while here although no sign of any bleeding.  You did receive a blood transfusion.  Some of this  was from a lot of shifting of fluids in your body from the IV albumin you received.  Recommend follow-up labs in 5 to 7 days.  Or for fevers at home.  GI has recommended starting oral ciprofloxacin daily to try to prevent SBP moving forward.     Follow-up and recommended labs and tests    Order Comments: Follow up with primary care provider, Amanda Aguero, within 5-7 days for hospital follow- up.  The following labs/tests are recommended: CBC and CMP.    Follow-up with Dr. Garibay in hepatology clinic.     Full Code     Order Specific Question Answer Comments   Code status determined by: Discussion with patient/ legal decision maker      Diet   Order Comments: Follow this diet upon discharge:       2 Gram Sodium Diet     Order Specific Question Answer Comments   Is discharge order? Yes          I, Rad Ulrich MD, personally saw the patient today and spent greater than 30 minutes discharging this patient.    Rad Ulrich MD

## 2021-01-19 NOTE — PROGRESS NOTES
Doing well.  Peritoneal fluid cell count much better.  Feels better.  Hg up to 8.2 after transfusion, no sign bleeding.  Discharge this after noon after 5th day ceftriaxone.   -? For GI If SBP prophylaxis with low dose ciprofloxacin recommended moving forward.

## 2021-01-19 NOTE — PROGRESS NOTES
"GASTROENTEROLOGY PROGRESS NOTE        SUBJECTIVE:  not available. Tried to call son but no answer. No abdominal distension. No abdominal tenderness on exam. No stools.      OBJECTIVE:    /47 (BP Location: Left arm)   Pulse 79   Temp 98.8  F (37.1  C) (Temporal)   Resp 18   Ht 1.626 m (5' 4\")   Wt 56.8 kg (125 lb 3.2 oz)   SpO2 98%   BMI 21.49 kg/m    Temp (24hrs), Av.2  F (36.8  C), Min:98  F (36.7  C), Max:98.4  F (36.9  C)    Patient Vitals for the past 72 hrs:   Weight   21 0625 56.8 kg (125 lb 3.2 oz)   21 0622 57.3 kg (126 lb 6.4 oz)       Intake/Output Summary (Last 24 hours) at 2021 1040  Last data filed at 2021 0654  Gross per 24 hour   Intake 510.9 ml   Output 1350 ml   Net -839.1 ml        PHYSICAL EXAM     Constitutional: Thin, chronically ill-appearing, no apparent distress    Abdomen: Soft, round, nontender to palpation.         Additional Comments:  ROS, FH, SH: See initial GI consult for details.     I have reviewed the patient's new clinical lab results:     Recent Labs   Lab Test 21  0721 21  0731 21  0545 01/15/21  0303 01/15/21  0303   WBC 3.0* 2.0* 2.4*   < > 6.2   HGB 8.2* 6.9* 7.5*   < > 10.2*   MCV 96 96 95   < > 93   PLT 65* 52* 52*   < > 108*   INR  --  3.13* 2.65*  --  2.07*    < > = values in this interval not displayed.     Recent Labs   Lab Test 21  0721 21  2353 21  1627 21  0731 21  0545 21  0545   POTASSIUM 3.8 3.8 3.3* 3.4   < > 3.3*   CHLORIDE 112*  --   --  112*  --  114*   CO2 23  --   --  22  --  23   BUN 3*  --   --  3*  --  6*   ANIONGAP 3  --   --  5  --  4    < > = values in this interval not displayed.     Recent Labs   Lab Test 21  0721 21  0545 21  0611 01/15/21  0526 16  0330 16  0330 09/18/15  1828 09/18/15  1828 09/18/15  182   ALBUMIN 2.2* 2.1* 2.1*  --    < >  --    < >  --  3.4   BILITOTAL 2.7* 3.6* 4.7*  --    < >  --    < >  --  0.4 "   ALT 14 16 13  --    < >  --    < >  --  32   AST 23 24 24  --    < >  --    < >  --  31   PROTEIN  --   --   --  Negative  --  Negative  --  Negative  --    LIPASE  --   --   --   --   --   --   --   --  233    < > = values in this interval not displayed.        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    1.  CHASE cirrhosis / history of HCC: Complicated by portal hypertension with ascites.  No evidence of active GI bleeding.  He continues on PPI and octreotide.  --TIPS procedure 12/28/2020 unfortunately still requiring weekly paracentesis.  --Paracentesis 1/13 2.6 L removed.  Previously on nadolol which was discontinued after the TIPS procedure.  Positive SBP, today is day 5 of IV ceftriaxone.   --Repeat paracentesis 1/18 2.4L removed. +SBP but improving numbers.   --Tubes patent on ultrasound.  --Continues on Lasix.   --Continue with lactulose, goal 3-4 bowel movements per day.  --Recommend ciprofloxacin 500mg daily for SBP prophylaxis after discharge.   --Will arrange follow up appt with Dr. Garibay.   --Ok to discharge from GI perspective.       Anne Sutton, PAC  [facility] ([facility])

## 2021-01-20 LAB
BACTERIA SPEC CULT: NO GROWTH
SPECIMEN SOURCE: NORMAL

## 2021-01-21 LAB
BACTERIA SPEC CULT: NO GROWTH
BACTERIA SPEC CULT: NO GROWTH
SPECIMEN SOURCE: NORMAL
SPECIMEN SOURCE: NORMAL

## 2021-01-22 LAB
BACTERIA SPEC CULT: NORMAL
Lab: NORMAL
SPECIMEN SOURCE: NORMAL

## 2021-01-23 LAB
BACTERIA SPEC CULT: NO GROWTH
SPECIMEN SOURCE: NORMAL

## 2021-01-25 ENCOUNTER — OFFICE VISIT (OUTPATIENT)
Dept: LAB | Facility: CLINIC | Age: 81
End: 2021-01-25
Payer: MEDICARE

## 2021-01-25 DIAGNOSIS — Z11.59 ENCOUNTER FOR SCREENING FOR OTHER VIRAL DISEASES: ICD-10-CM

## 2021-01-25 LAB
SARS-COV-2 RNA RESP QL NAA+PROBE: NORMAL
SPECIMEN SOURCE: NORMAL

## 2021-01-25 PROCEDURE — U0003 INFECTIOUS AGENT DETECTION BY NUCLEIC ACID (DNA OR RNA); SEVERE ACUTE RESPIRATORY SYNDROME CORONAVIRUS 2 (SARS-COV-2) (CORONAVIRUS DISEASE [COVID-19]), AMPLIFIED PROBE TECHNIQUE, MAKING USE OF HIGH THROUGHPUT TECHNOLOGIES AS DESCRIBED BY CMS-2020-01-R: HCPCS | Performed by: INTERNAL MEDICINE

## 2021-01-25 PROCEDURE — U0005 INFEC AGEN DETEC AMPLI PROBE: HCPCS | Performed by: INTERNAL MEDICINE

## 2021-01-26 LAB
LABORATORY COMMENT REPORT: NORMAL
SARS-COV-2 RNA RESP QL NAA+PROBE: NEGATIVE
SPECIMEN SOURCE: NORMAL

## 2021-01-28 ENCOUNTER — HOSPITAL ENCOUNTER (OUTPATIENT)
Dept: ULTRASOUND IMAGING | Facility: CLINIC | Age: 81
Discharge: HOME | End: 2021-01-28
Attending: INTERNAL MEDICINE | Admitting: INTERNAL MEDICINE
Payer: MEDICARE

## 2021-01-28 VITALS — DIASTOLIC BLOOD PRESSURE: 60 MMHG | OXYGEN SATURATION: 100 % | SYSTOLIC BLOOD PRESSURE: 104 MMHG | HEART RATE: 80 BPM

## 2021-01-28 DIAGNOSIS — R18.8 OTHER ASCITES: ICD-10-CM

## 2021-01-28 PROCEDURE — 49083 ABD PARACENTESIS W/IMAGING: CPT

## 2021-01-28 RX ORDER — DEXTROSE MONOHYDRATE 25 G/50ML
25-50 INJECTION, SOLUTION INTRAVENOUS
Status: CANCELLED | OUTPATIENT
Start: 2021-01-28

## 2021-01-28 RX ORDER — NICOTINE POLACRILEX 4 MG
15-30 LOZENGE BUCCAL
Status: CANCELLED | OUTPATIENT
Start: 2021-01-28

## 2021-01-28 RX ORDER — ALBUMIN (HUMAN) 12.5 G/50ML
12.5 SOLUTION INTRAVENOUS ONCE
Status: CANCELLED | OUTPATIENT
Start: 2021-01-28 | End: 2021-01-28

## 2021-01-28 NOTE — PROGRESS NOTES
Pt was in Radiology today for a paracentesis. Procedure performed by Dr Butcher Procedure was tolerated well, vitals remained stable. 3000 cc's of dark yellow slightly cloudy colored fluid removed. Albumin was refused because we were not able to IV on first stick, pt has been feeling much better and is ok holding that for nowl. Pt left department in stable satisfactory condition with his son. There is no evidence of bleeding upon discharge.  Glue and bandage applied

## 2021-01-28 NOTE — PROCEDURES
[facility] [place]    Procedure: US Para    Date/Time: 1/28/2021 5:17 PM  Performed by: Parag Warner MD  Authorized by: Parag Warner MD     UNIVERSAL PROTOCOL   Site Marked: NA  Prior Images Obtained and Reviewed:  Yes  Required items: Required blood products, implants, devices and special equipment available    Patient identity confirmed:  Verbally with patient, arm band, provided demographic data and hospital-assigned identification number  Patient was reevaluated immediately before administering moderate or deep sedation or anesthesia  Confirmation Checklist:  Patient's identity using two indicators, relevant allergies, procedure was appropriate and matched the consent or emergent situation and correct equipment/implants were available  Time out: Immediately prior to the procedure a time out was called    Universal Protocol: the Joint Commission Universal Protocol was followed    Preparation: Patient was prepped and draped in usual sterile fashion    ESBL (mL):  2         ANESTHESIA    Anesthesia: Local infiltration  Local Anesthetic:  Lidocaine 1% without epinephrine      SEDATION    Patient Sedated: No    See dictated procedure note for full details.  Findings: US Para in LLQ    Specimens: none    Complications: None    Condition: Stable    PROCEDURE   Patient Tolerance:  Patient tolerated the procedure well with no immediate complications    Length of time physician/provider present for 1:1 monitoring during sedation: 0

## 2021-02-08 DIAGNOSIS — Z11.59 ENCOUNTER FOR SCREENING FOR OTHER VIRAL DISEASES: ICD-10-CM

## 2021-02-08 LAB
SARS-COV-2 RNA RESP QL NAA+PROBE: NORMAL
SPECIMEN SOURCE: NORMAL

## 2021-02-08 PROCEDURE — U0005 INFEC AGEN DETEC AMPLI PROBE: HCPCS | Performed by: INTERNAL MEDICINE

## 2021-02-08 PROCEDURE — U0003 INFECTIOUS AGENT DETECTION BY NUCLEIC ACID (DNA OR RNA); SEVERE ACUTE RESPIRATORY SYNDROME CORONAVIRUS 2 (SARS-COV-2) (CORONAVIRUS DISEASE [COVID-19]), AMPLIFIED PROBE TECHNIQUE, MAKING USE OF HIGH THROUGHPUT TECHNOLOGIES AS DESCRIBED BY CMS-2020-01-R: HCPCS | Performed by: INTERNAL MEDICINE

## 2021-02-11 ENCOUNTER — HOSPITAL ENCOUNTER (OUTPATIENT)
Dept: ULTRASOUND IMAGING | Facility: CLINIC | Age: 81
Discharge: HOME | End: 2021-02-11
Attending: INTERNAL MEDICINE | Admitting: INTERNAL MEDICINE
Payer: MEDICARE

## 2021-02-11 ENCOUNTER — IMMUNIZATION (OUTPATIENT)
Dept: NURSING | Facility: CLINIC | Age: 81
End: 2021-02-11
Payer: MEDICARE

## 2021-02-11 VITALS
RESPIRATION RATE: 16 BRPM | HEART RATE: 80 BPM | DIASTOLIC BLOOD PRESSURE: 43 MMHG | OXYGEN SATURATION: 100 % | SYSTOLIC BLOOD PRESSURE: 125 MMHG

## 2021-02-11 DIAGNOSIS — R18.8 OTHER ASCITES: ICD-10-CM

## 2021-02-11 PROCEDURE — 0001A PR COVID VAC PFIZER DIL RECON 30 MCG/0.3 ML IM: CPT

## 2021-02-11 PROCEDURE — 250N000009 HC RX 250: Performed by: RADIOLOGY

## 2021-02-11 PROCEDURE — 91300 PR COVID VAC PFIZER DIL RECON 30 MCG/0.3 ML IM: CPT

## 2021-02-11 PROCEDURE — 272N000042 US PARACENTESIS

## 2021-02-11 PROCEDURE — 49083 ABD PARACENTESIS W/IMAGING: CPT

## 2021-02-11 RX ADMIN — LIDOCAINE HYDROCHLORIDE 10 ML: 10 INJECTION, SOLUTION EPIDURAL; INFILTRATION; INTRACAUDAL; PERINEURAL at 14:13

## 2021-02-11 NOTE — PROGRESS NOTES
Patient tolerated paracentesis well.  3200 mL of straw colored fluid drained done by Dr Mittal  Dressing clean dry and intact with no drainage.  NO albumin given, declined. Patient states understanding discharge instructions, taking P.O and home per son.  Deandre Hanson, RN, BSN

## 2021-02-11 NOTE — PROCEDURES
[facility]    Procedure: Paracentesis    Date/Time: 2/11/2021 3:17 PM  Performed by: Kayli Mittal MD  Authorized by: Kayli Mittal MD     UNIVERSAL PROTOCOL   Site Marked: Yes  Prior Images Obtained and Reviewed:  Yes  Required items: Required blood products, implants, devices and special equipment available    Patient identity confirmed:  Verbally with patient  Patient was reevaluated immediately before administering moderate or deep sedation or anesthesia  Confirmation Checklist:  Patient's identity using two indicators, procedure was appropriate and matched the consent or emergent situation, correct equipment/implants were available and relevant allergies  Time out: Immediately prior to the procedure a time out was called    Universal Protocol: the Joint Commission Universal Protocol was followed    Preparation: Patient was prepped and draped in usual sterile fashion        PROCEDURE   Patient Tolerance:  Patient tolerated the procedure well with no immediate complications    Length of time physician/provider present for 1:1 monitoring during sedation: 0

## 2021-02-18 ENCOUNTER — HOSPITAL ENCOUNTER (OUTPATIENT)
Dept: ULTRASOUND IMAGING | Facility: CLINIC | Age: 81
Discharge: HOME | End: 2021-02-18
Attending: INTERNAL MEDICINE | Admitting: INTERNAL MEDICINE
Payer: MEDICARE

## 2021-02-18 VITALS — DIASTOLIC BLOOD PRESSURE: 68 MMHG | SYSTOLIC BLOOD PRESSURE: 135 MMHG | RESPIRATION RATE: 16 BRPM | HEART RATE: 82 BPM

## 2021-02-18 DIAGNOSIS — Z11.59 ENCOUNTER FOR SCREENING FOR OTHER VIRAL DISEASES: Primary | ICD-10-CM

## 2021-02-18 DIAGNOSIS — R18.8 OTHER ASCITES: ICD-10-CM

## 2021-02-18 PROCEDURE — 49083 ABD PARACENTESIS W/IMAGING: CPT

## 2021-02-18 PROCEDURE — 250N000009 HC RX 250: Performed by: RADIOLOGY

## 2021-02-18 RX ADMIN — LIDOCAINE HYDROCHLORIDE 10 ML: 10 INJECTION, SOLUTION EPIDURAL; INFILTRATION; INTRACAUDAL; PERINEURAL at 14:07

## 2021-02-18 NOTE — PROGRESS NOTES
Paracentesis complete.  Consent obtained with Dr Yang.  Pt tolerated well, drained 3500 mLs clear demetrius colored fluid.  Site at LLQ.  Site covered with a sterile bandaid.  Site CDI.   VSS.  No complications noted.  Reviewed discharge instructions.  Pt stable on discharge.

## 2021-02-18 NOTE — IR NOTE
ULTRASOUND GUIDED PARACENTESIS   2/18/2021 2:33 PM     HISTORY:  Ascites    PROCEDURE:   Informed consent was obtained from the patient prior to the procedure with discussion including the possible risks of bleeding, infection and organ injury . Using 5 mL of 1% lidocaine for local anesthesia, sterile technique, and sonographic guidance with permanent image documentation, I placed an 8F paracentesis catheter into the peritoneal fluid collection. This was used to aspirate 3500 mL of yellow, serous fluid in vacuum bottles, and some of this was sent for any laboratory studies that had been ordered. There were no immediate complications.  Intravenous albumen replacement was performed according to protocol.    IMPRESSION:  Ultrasound guided paracentesis.

## 2021-02-22 DIAGNOSIS — Z11.59 ENCOUNTER FOR SCREENING FOR OTHER VIRAL DISEASES: ICD-10-CM

## 2021-02-22 LAB
SARS-COV-2 RNA RESP QL NAA+PROBE: NORMAL
SPECIMEN SOURCE: NORMAL

## 2021-02-22 PROCEDURE — U0003 INFECTIOUS AGENT DETECTION BY NUCLEIC ACID (DNA OR RNA); SEVERE ACUTE RESPIRATORY SYNDROME CORONAVIRUS 2 (SARS-COV-2) (CORONAVIRUS DISEASE [COVID-19]), AMPLIFIED PROBE TECHNIQUE, MAKING USE OF HIGH THROUGHPUT TECHNOLOGIES AS DESCRIBED BY CMS-2020-01-R: HCPCS | Performed by: INTERNAL MEDICINE

## 2021-02-22 PROCEDURE — U0005 INFEC AGEN DETEC AMPLI PROBE: HCPCS | Performed by: INTERNAL MEDICINE

## 2021-02-25 ENCOUNTER — HOSPITAL ENCOUNTER (OUTPATIENT)
Dept: ULTRASOUND IMAGING | Facility: CLINIC | Age: 81
Discharge: HOME | End: 2021-02-25
Attending: INTERNAL MEDICINE | Admitting: INTERNAL MEDICINE
Payer: MEDICARE

## 2021-02-25 VITALS — DIASTOLIC BLOOD PRESSURE: 52 MMHG | SYSTOLIC BLOOD PRESSURE: 132 MMHG | HEART RATE: 87 BPM

## 2021-02-25 DIAGNOSIS — R18.8 OTHER ASCITES: ICD-10-CM

## 2021-02-25 PROCEDURE — 49083 ABD PARACENTESIS W/IMAGING: CPT

## 2021-02-25 PROCEDURE — 250N000009 HC RX 250: Performed by: RADIOLOGY

## 2021-02-25 RX ADMIN — LIDOCAINE HYDROCHLORIDE 10 ML: 10 INJECTION, SOLUTION EPIDURAL; INFILTRATION; INTRACAUDAL; PERINEURAL at 14:09

## 2021-02-25 NOTE — PROGRESS NOTES
Consent for paracentesis obtained from son by Dr. Almaraz. Pt tolerated 3800 ml's yellow fluid removal well. Pt and son declined IV start for albumin.  Will agree to being given if 4000 ml's Pressure to site then sterile dressing.  Son states understanding of post cares.  Discharged ambuatory with no evident complications.

## 2021-02-26 DIAGNOSIS — Z11.59 ENCOUNTER FOR SCREENING FOR OTHER VIRAL DISEASES: ICD-10-CM

## 2021-03-01 DIAGNOSIS — Z11.59 ENCOUNTER FOR SCREENING FOR OTHER VIRAL DISEASES: ICD-10-CM

## 2021-03-04 ENCOUNTER — IMMUNIZATION (OUTPATIENT)
Dept: NURSING | Facility: CLINIC | Age: 81
End: 2021-03-04
Payer: MEDICARE

## 2021-03-04 ENCOUNTER — HOSPITAL ENCOUNTER (OUTPATIENT)
Dept: ULTRASOUND IMAGING | Facility: CLINIC | Age: 81
Discharge: HOME | End: 2021-03-04
Attending: INTERNAL MEDICINE | Admitting: INTERNAL MEDICINE
Payer: MEDICARE

## 2021-03-04 VITALS
DIASTOLIC BLOOD PRESSURE: 53 MMHG | HEART RATE: 89 BPM | SYSTOLIC BLOOD PRESSURE: 124 MMHG | RESPIRATION RATE: 16 BRPM | OXYGEN SATURATION: 99 %

## 2021-03-04 DIAGNOSIS — R18.8 OTHER ASCITES: ICD-10-CM

## 2021-03-04 PROCEDURE — 91300 PR COVID VAC PFIZER DIL RECON 30 MCG/0.3 ML IM: CPT

## 2021-03-04 PROCEDURE — 0002A PR COVID VAC PFIZER DIL RECON 30 MCG/0.3 ML IM: CPT

## 2021-03-04 PROCEDURE — 250N000009 HC RX 250: Performed by: RADIOLOGY

## 2021-03-04 PROCEDURE — 49083 ABD PARACENTESIS W/IMAGING: CPT

## 2021-03-04 RX ADMIN — LIDOCAINE HYDROCHLORIDE 10 ML: 10 INJECTION, SOLUTION EPIDURAL; INFILTRATION; INTRACAUDAL; PERINEURAL at 14:23

## 2021-03-04 NOTE — PROGRESS NOTES
Paracentesis complete.  Consent obtained with Dr Moseley.  Pt tolerated well, drained 3000 mLs clear demetrius colored fluid.  Site at LRQ.  Site covered with a sterile bandaid.  Site CDI.    VSS.  No complications noted.  Reviewed discharge instructions with pt.  Pt stable on discharge.

## 2021-03-04 NOTE — PROCEDURES
[facility]    Procedure: Paracentesis.     Date/Time: 3/4/2021 3:14 PM  Performed by: Rosenda Moseley DO  Authorized by: Rosenda Moseley DO     UNIVERSAL PROTOCOL   Site Marked: Yes  Prior Images Obtained and Reviewed:  Yes  Required items: Required blood products, implants, devices and special equipment available    Patient identity confirmed:  Verbally with patient, arm band, provided demographic data and hospital-assigned identification number  Patient was reevaluated immediately before administering moderate or deep sedation or anesthesia  Confirmation Checklist:  Patient's identity using two indicators, relevant allergies, procedure was appropriate and matched the consent or emergent situation and correct equipment/implants were available  Time out: Immediately prior to the procedure a time out was called    Universal Protocol: the Joint Commission Universal Protocol was followed    Preparation: Patient was prepped and draped in usual sterile fashion           ANESTHESIA    Anesthesia: Local infiltration  Local Anesthetic:  Lidocaine 1% without epinephrine      SEDATION    Patient Sedated: No    See dictated procedure note for full details.  Findings: Paracentesis.     Specimens: none    Complications: None    Condition: Stable    PROCEDURE   Patient Tolerance:  Patient tolerated the procedure well with no immediate complications    Length of time physician/provider present for 1:1 monitoring during sedation: 0

## 2021-03-11 ENCOUNTER — HOSPITAL ENCOUNTER (OUTPATIENT)
Dept: ULTRASOUND IMAGING | Facility: CLINIC | Age: 81
Discharge: HOME | End: 2021-03-11
Attending: INTERNAL MEDICINE | Admitting: INTERNAL MEDICINE
Payer: MEDICARE

## 2021-03-11 VITALS — HEART RATE: 99 BPM | SYSTOLIC BLOOD PRESSURE: 150 MMHG | DIASTOLIC BLOOD PRESSURE: 59 MMHG

## 2021-03-11 DIAGNOSIS — K74.60 LIVER CIRRHOSIS SECONDARY TO NASH (H): ICD-10-CM

## 2021-03-11 DIAGNOSIS — K75.81 LIVER CIRRHOSIS SECONDARY TO NASH (H): ICD-10-CM

## 2021-03-11 DIAGNOSIS — K74.60 UNSPECIFIED CIRRHOSIS OF LIVER (H): ICD-10-CM

## 2021-03-11 PROCEDURE — 250N000009 HC RX 250: Performed by: RADIOLOGY

## 2021-03-11 PROCEDURE — 49083 ABD PARACENTESIS W/IMAGING: CPT

## 2021-03-11 RX ORDER — LIDOCAINE HYDROCHLORIDE 10 MG/ML
10 INJECTION, SOLUTION EPIDURAL; INFILTRATION; INTRACAUDAL; PERINEURAL ONCE
Status: COMPLETED | OUTPATIENT
Start: 2021-03-11 | End: 2021-03-11

## 2021-03-11 RX ADMIN — LIDOCAINE HYDROCHLORIDE 10 ML: 10 INJECTION, SOLUTION EPIDURAL; INFILTRATION; INTRACAUDAL; PERINEURAL at 14:21

## 2021-03-11 NOTE — PROGRESS NOTES
Pt was in Radiology today for a paracentesis. Procedure performed by HALLE Moseley.Procedure was tolerated well, vitals remained stable. 3800 cc's of cloudy yellow colored fluid removed.Pt refusing Albumin today  Pt left department in stable satisfactory condition with son. There is no evidence of bleeding upon discharge.

## 2021-03-11 NOTE — PROCEDURES
[facility]    Procedure: Paracentesis.     Date/Time: 3/11/2021 4:15 PM  Performed by: Rosenda Moseley DO  Authorized by: Rosenda Moseley DO     UNIVERSAL PROTOCOL   Site Marked: Yes  Prior Images Obtained and Reviewed:  Yes  Required items: Required blood products, implants, devices and special equipment available    Patient identity confirmed:  Verbally with patient, arm band, provided demographic data and hospital-assigned identification number  Patient was reevaluated immediately before administering moderate or deep sedation or anesthesia  Confirmation Checklist:  Patient's identity using two indicators, relevant allergies, procedure was appropriate and matched the consent or emergent situation and correct equipment/implants were available  Time out: Immediately prior to the procedure a time out was called    Universal Protocol: the Joint Commission Universal Protocol was followed    Preparation: Patient was prepped and draped in usual sterile fashion           ANESTHESIA    Anesthesia: Local infiltration  Local Anesthetic:  Lidocaine 1% without epinephrine      SEDATION    Patient Sedated: No    See dictated procedure note for full details.  Findings: Paracentesis.     Specimens: none    Complications: None    Condition: Stable    PROCEDURE   Patient Tolerance:  Patient tolerated the procedure well with no immediate complications    Length of time physician/provider present for 1:1 monitoring during sedation: 0

## 2021-03-15 DIAGNOSIS — Z11.59 ENCOUNTER FOR SCREENING FOR OTHER VIRAL DISEASES: ICD-10-CM

## 2021-03-18 ENCOUNTER — HOSPITAL ENCOUNTER (OUTPATIENT)
Dept: ULTRASOUND IMAGING | Facility: CLINIC | Age: 81
Discharge: HOME | End: 2021-03-18
Attending: INTERNAL MEDICINE | Admitting: INTERNAL MEDICINE
Payer: MEDICARE

## 2021-03-18 VITALS — DIASTOLIC BLOOD PRESSURE: 68 MMHG | SYSTOLIC BLOOD PRESSURE: 134 MMHG

## 2021-03-18 DIAGNOSIS — R18.8 OTHER ASCITES: ICD-10-CM

## 2021-03-18 PROCEDURE — 250N000009 HC RX 250: Performed by: RADIOLOGY

## 2021-03-18 PROCEDURE — 49083 ABD PARACENTESIS W/IMAGING: CPT

## 2021-03-18 RX ADMIN — LIDOCAINE HYDROCHLORIDE 10 ML: 10 INJECTION, SOLUTION EPIDURAL; INFILTRATION; INTRACAUDAL; PERINEURAL at 14:13

## 2021-03-18 NOTE — PROCEDURES
[facility]    Procedure: Paracentesis.     Date/Time: 3/18/2021 2:17 PM  Performed by: Rosenda Moseley DO  Authorized by: Rosenda Moseley DO     UNIVERSAL PROTOCOL   Site Marked: Yes  Prior Images Obtained and Reviewed:  Yes  Required items: Required blood products, implants, devices and special equipment available    Patient identity confirmed:  Verbally with patient, arm band, provided demographic data and hospital-assigned identification number  Patient was reevaluated immediately before administering moderate or deep sedation or anesthesia  Confirmation Checklist:  Patient's identity using two indicators, relevant allergies, procedure was appropriate and matched the consent or emergent situation and correct equipment/implants were available  Time out: Immediately prior to the procedure a time out was called    Universal Protocol: the Joint Commission Universal Protocol was followed    Preparation: Patient was prepped and draped in usual sterile fashion           ANESTHESIA    Anesthesia: Local infiltration  Local Anesthetic:  Lidocaine 1% without epinephrine      SEDATION    Patient Sedated: No    See dictated procedure note for full details.  Findings: Paracentesis.     Specimens: none    Complications: None    Condition: Stable    PROCEDURE   Patient Tolerance:  Patient tolerated the procedure well with no immediate complications    Length of time physician/provider present for 1:1 monitoring during sedation: 0

## 2021-03-18 NOTE — PROGRESS NOTES
Consent for paracentesis obtained by Dr. Moseley. Pt tolerated 2200 ml's yellow fluid removal well with stable vital signs.  Pressure to site then sterile dressing.  Son states understanding of post site care.  Pt discharged ambulatory with son to home with no evident bleeding or complications.

## 2021-03-22 DIAGNOSIS — Z11.59 ENCOUNTER FOR SCREENING FOR OTHER VIRAL DISEASES: ICD-10-CM

## 2021-03-22 LAB
SARS-COV-2 RNA RESP QL NAA+PROBE: NORMAL
SPECIMEN SOURCE: NORMAL

## 2021-03-22 PROCEDURE — U0005 INFEC AGEN DETEC AMPLI PROBE: HCPCS | Performed by: INTERNAL MEDICINE

## 2021-03-22 PROCEDURE — U0003 INFECTIOUS AGENT DETECTION BY NUCLEIC ACID (DNA OR RNA); SEVERE ACUTE RESPIRATORY SYNDROME CORONAVIRUS 2 (SARS-COV-2) (CORONAVIRUS DISEASE [COVID-19]), AMPLIFIED PROBE TECHNIQUE, MAKING USE OF HIGH THROUGHPUT TECHNOLOGIES AS DESCRIBED BY CMS-2020-01-R: HCPCS | Performed by: INTERNAL MEDICINE

## 2021-03-23 DIAGNOSIS — Z11.59 ENCOUNTER FOR SCREENING FOR OTHER VIRAL DISEASES: ICD-10-CM

## 2021-03-25 ENCOUNTER — HOSPITAL ENCOUNTER (OUTPATIENT)
Dept: ULTRASOUND IMAGING | Facility: CLINIC | Age: 81
Discharge: HOME | End: 2021-03-25
Attending: INTERNAL MEDICINE | Admitting: INTERNAL MEDICINE
Payer: MEDICARE

## 2021-03-25 VITALS — DIASTOLIC BLOOD PRESSURE: 52 MMHG | SYSTOLIC BLOOD PRESSURE: 123 MMHG | HEART RATE: 85 BPM

## 2021-03-25 DIAGNOSIS — R18.8 OTHER ASCITES: ICD-10-CM

## 2021-03-25 PROCEDURE — 49083 ABD PARACENTESIS W/IMAGING: CPT

## 2021-03-25 PROCEDURE — 250N000009 HC RX 250: Performed by: RADIOLOGY

## 2021-03-25 RX ORDER — ALBUMIN (HUMAN) 12.5 G/50ML
12.5 SOLUTION INTRAVENOUS ONCE
Status: CANCELLED | OUTPATIENT
Start: 2021-03-25 | End: 2021-03-25

## 2021-03-25 RX ADMIN — LIDOCAINE HYDROCHLORIDE 10 ML: 10 INJECTION, SOLUTION EPIDURAL; INFILTRATION; INTRACAUDAL; PERINEURAL at 14:12

## 2021-03-25 NOTE — PROGRESS NOTES
Consent for paracentesis obtained by Dr. Hartman.  Pt tolerated 1950 ml's fluid removal well.  Vital signs stable.  Sterile dressing to site after pressure held.  Discharged ambultory to home with daughter in law with no evident bleeding or complications.  States understanding of post care instructions.

## 2021-03-25 NOTE — PROCEDURES
[facility]    Procedure: US paracentesis    Date/Time: 3/25/2021 3:24 PM  Performed by: Parag Warner MD  Authorized by: Parag Warner MD     UNIVERSAL PROTOCOL   Site Marked: NA  Prior Images Obtained and Reviewed:  Yes  Required items: Required blood products, implants, devices and special equipment available    Patient identity confirmed:  Verbally with patient, arm band, provided demographic data and hospital-assigned identification number  Patient was reevaluated immediately before administering moderate or deep sedation or anesthesia  Confirmation Checklist:  Patient's identity using two indicators, relevant allergies, procedure was appropriate and matched the consent or emergent situation and correct equipment/implants were available  Time out: Immediately prior to the procedure a time out was called    Universal Protocol: the Joint Commission Universal Protocol was followed    Preparation: Patient was prepped and draped in usual sterile fashion    ESBL (mL):  2         ANESTHESIA    Anesthesia: Local infiltration  Local Anesthetic:  Lidocaine 1% without epinephrine      SEDATION    Patient Sedated: No    See dictated procedure note for full details.  Findings: US guided left paracentesis    Specimens: none    Complications: None    Condition: Stable    PROCEDURE   Patient Tolerance:  Patient tolerated the procedure well with no immediate complications    Length of time physician/provider present for 1:1 monitoring during sedation: 0

## 2021-03-29 DIAGNOSIS — Z11.59 ENCOUNTER FOR SCREENING FOR OTHER VIRAL DISEASES: Primary | ICD-10-CM

## 2021-04-01 ENCOUNTER — HOSPITAL ENCOUNTER (OUTPATIENT)
Dept: ULTRASOUND IMAGING | Facility: CLINIC | Age: 81
Discharge: HOME | End: 2021-04-01
Attending: INTERNAL MEDICINE | Admitting: INTERNAL MEDICINE
Payer: MEDICARE

## 2021-04-01 VITALS
OXYGEN SATURATION: 96 % | HEART RATE: 80 BPM | SYSTOLIC BLOOD PRESSURE: 129 MMHG | DIASTOLIC BLOOD PRESSURE: 52 MMHG | RESPIRATION RATE: 16 BRPM

## 2021-04-01 DIAGNOSIS — R18.8 OTHER ASCITES: ICD-10-CM

## 2021-04-01 PROCEDURE — 250N000009 HC RX 250: Performed by: RADIOLOGY

## 2021-04-01 PROCEDURE — 272N000706 US PARACENTESIS

## 2021-04-01 RX ORDER — LIDOCAINE HYDROCHLORIDE 10 MG/ML
10 INJECTION, SOLUTION EPIDURAL; INFILTRATION; INTRACAUDAL; PERINEURAL ONCE
Status: COMPLETED | OUTPATIENT
Start: 2021-04-01 | End: 2021-04-01

## 2021-04-01 RX ADMIN — LIDOCAINE HYDROCHLORIDE 10 ML: 10 INJECTION, SOLUTION EPIDURAL; INFILTRATION; INTRACAUDAL; PERINEURAL at 14:38

## 2021-04-01 NOTE — PROGRESS NOTES
Paracentesis complete.  Consent obtained with Dr. Stark.  Pt tolerated well, drained 2,100mLs dark yellow colored fluid.  Site at Left.  Site covered with a bandaid.  Site CDI.  Albumin not given.  Sample not sent to lab.  VSS.  No complications noted.  Reviewed discharge instructions with pt.  Pt used can on discharge and home with son.

## 2021-04-07 ENCOUNTER — HOSPITAL ENCOUNTER (EMERGENCY)
Facility: CLINIC | Age: 81
Discharge: OTHER INSTITUTION | End: 2021-04-08
Attending: PHYSICIAN ASSISTANT | Admitting: PHYSICIAN ASSISTANT
Payer: MEDICARE

## 2021-04-07 DIAGNOSIS — K42.9 UMBILICAL HERNIA WITHOUT OBSTRUCTION AND WITHOUT GANGRENE: ICD-10-CM

## 2021-04-07 DIAGNOSIS — N39.45 CONTINUOUS LEAKAGE: ICD-10-CM

## 2021-04-07 DIAGNOSIS — R18.8 ASCITIC FLUID: ICD-10-CM

## 2021-04-07 LAB
ALBUMIN SERPL-MCNC: 1.5 G/DL (ref 3.4–5)
ALP SERPL-CCNC: 137 U/L (ref 40–150)
ALT SERPL W P-5'-P-CCNC: 28 U/L (ref 0–70)
ANION GAP SERPL CALCULATED.3IONS-SCNC: 5 MMOL/L (ref 3–14)
AST SERPL W P-5'-P-CCNC: 46 U/L (ref 0–45)
BASOPHILS # BLD AUTO: 0 10E9/L (ref 0–0.2)
BASOPHILS NFR BLD AUTO: 0.2 %
BILIRUB SERPL-MCNC: 1.4 MG/DL (ref 0.2–1.3)
BUN SERPL-MCNC: 8 MG/DL (ref 7–30)
CALCIUM SERPL-MCNC: 7.3 MG/DL (ref 8.5–10.1)
CHLORIDE SERPL-SCNC: 109 MMOL/L (ref 94–109)
CO2 SERPL-SCNC: 22 MMOL/L (ref 20–32)
CREAT SERPL-MCNC: 0.78 MG/DL (ref 0.66–1.25)
DIFFERENTIAL METHOD BLD: ABNORMAL
EOSINOPHIL # BLD AUTO: 0.4 10E9/L (ref 0–0.7)
EOSINOPHIL NFR BLD AUTO: 8.9 %
ERYTHROCYTE [DISTWIDTH] IN BLOOD BY AUTOMATED COUNT: 16.4 % (ref 10–15)
GFR SERPL CREATININE-BSD FRML MDRD: 86 ML/MIN/{1.73_M2}
GLUCOSE SERPL-MCNC: 119 MG/DL (ref 70–99)
HCT VFR BLD AUTO: 25.7 % (ref 40–53)
HGB BLD-MCNC: 8.5 G/DL (ref 13.3–17.7)
IMM GRANULOCYTES # BLD: 0 10E9/L (ref 0–0.4)
IMM GRANULOCYTES NFR BLD: 0.4 %
INR PPP: 2.07 (ref 0.86–1.14)
LACTATE BLD-SCNC: 1.7 MMOL/L (ref 0.7–2)
LYMPHOCYTES # BLD AUTO: 0.3 10E9/L (ref 0.8–5.3)
LYMPHOCYTES NFR BLD AUTO: 5.3 %
MCH RBC QN AUTO: 29.3 PG (ref 26.5–33)
MCHC RBC AUTO-ENTMCNC: 33.1 G/DL (ref 31.5–36.5)
MCV RBC AUTO: 89 FL (ref 78–100)
MONOCYTES # BLD AUTO: 0.4 10E9/L (ref 0–1.3)
MONOCYTES NFR BLD AUTO: 7.6 %
NEUTROPHILS # BLD AUTO: 3.7 10E9/L (ref 1.6–8.3)
NEUTROPHILS NFR BLD AUTO: 77.6 %
NRBC # BLD AUTO: 0 10*3/UL
NRBC BLD AUTO-RTO: 0 /100
PLATELET # BLD AUTO: 96 10E9/L (ref 150–450)
POTASSIUM SERPL-SCNC: 3.9 MMOL/L (ref 3.4–5.3)
PROT SERPL-MCNC: 6.3 G/DL (ref 6.8–8.8)
RBC # BLD AUTO: 2.9 10E12/L (ref 4.4–5.9)
SODIUM SERPL-SCNC: 136 MMOL/L (ref 133–144)
WBC # BLD AUTO: 4.7 10E9/L (ref 4–11)

## 2021-04-07 PROCEDURE — 85610 PROTHROMBIN TIME: CPT | Performed by: PHYSICIAN ASSISTANT

## 2021-04-07 PROCEDURE — 83605 ASSAY OF LACTIC ACID: CPT | Performed by: PHYSICIAN ASSISTANT

## 2021-04-07 PROCEDURE — 96365 THER/PROPH/DIAG IV INF INIT: CPT

## 2021-04-07 PROCEDURE — 80053 COMPREHEN METABOLIC PANEL: CPT | Performed by: PHYSICIAN ASSISTANT

## 2021-04-07 PROCEDURE — 96366 THER/PROPH/DIAG IV INF ADDON: CPT

## 2021-04-07 PROCEDURE — 99285 EMERGENCY DEPT VISIT HI MDM: CPT

## 2021-04-07 PROCEDURE — 85025 COMPLETE CBC W/AUTO DIFF WBC: CPT | Performed by: PHYSICIAN ASSISTANT

## 2021-04-07 PROCEDURE — 87635 SARS-COV-2 COVID-19 AMP PRB: CPT | Performed by: PHYSICIAN ASSISTANT

## 2021-04-07 PROCEDURE — 36415 COLL VENOUS BLD VENIPUNCTURE: CPT | Performed by: PHYSICIAN ASSISTANT

## 2021-04-07 PROCEDURE — C9803 HOPD COVID-19 SPEC COLLECT: HCPCS

## 2021-04-08 ENCOUNTER — ANESTHESIA (OUTPATIENT)
Dept: SURGERY | Facility: CLINIC | Age: 81
DRG: 354 | End: 2021-04-08
Payer: MEDICARE

## 2021-04-08 ENCOUNTER — HOSPITAL ENCOUNTER (INPATIENT)
Facility: CLINIC | Age: 81
LOS: 3 days | Discharge: HOME HEALTH | DRG: 354 | End: 2021-04-11
Attending: SURGERY | Admitting: SURGERY
Payer: MEDICARE

## 2021-04-08 ENCOUNTER — ANESTHESIA EVENT (OUTPATIENT)
Dept: SURGERY | Facility: CLINIC | Age: 81
DRG: 354 | End: 2021-04-08
Payer: MEDICARE

## 2021-04-08 VITALS
OXYGEN SATURATION: 100 % | TEMPERATURE: 98.3 F | HEART RATE: 72 BPM | SYSTOLIC BLOOD PRESSURE: 101 MMHG | RESPIRATION RATE: 16 BRPM | DIASTOLIC BLOOD PRESSURE: 44 MMHG

## 2021-04-08 DIAGNOSIS — K42.9 UMBILICAL HERNIA WITHOUT OBSTRUCTION AND WITHOUT GANGRENE: ICD-10-CM

## 2021-04-08 DIAGNOSIS — K74.60 CIRRHOSIS OF LIVER WITH ASCITES, UNSPECIFIED HEPATIC CIRRHOSIS TYPE (H): Primary | ICD-10-CM

## 2021-04-08 DIAGNOSIS — R18.8 CIRRHOSIS OF LIVER WITH ASCITES, UNSPECIFIED HEPATIC CIRRHOSIS TYPE (H): Primary | ICD-10-CM

## 2021-04-08 LAB
ALBUMIN SERPL-MCNC: 1.4 G/DL (ref 3.4–5)
ALP SERPL-CCNC: 120 U/L (ref 40–150)
ALT SERPL W P-5'-P-CCNC: 23 U/L (ref 0–70)
ANION GAP SERPL CALCULATED.3IONS-SCNC: 7 MMOL/L (ref 3–14)
AST SERPL W P-5'-P-CCNC: 34 U/L (ref 0–45)
BILIRUB DIRECT SERPL-MCNC: 0.7 MG/DL (ref 0–0.2)
BILIRUB SERPL-MCNC: 2.1 MG/DL (ref 0.2–1.3)
BUN SERPL-MCNC: 8 MG/DL (ref 7–30)
CALCIUM SERPL-MCNC: 7.6 MG/DL (ref 8.5–10.1)
CHLORIDE SERPL-SCNC: 110 MMOL/L (ref 94–109)
CO2 SERPL-SCNC: 19 MMOL/L (ref 20–32)
CREAT SERPL-MCNC: 0.73 MG/DL (ref 0.66–1.25)
ERYTHROCYTE [DISTWIDTH] IN BLOOD BY AUTOMATED COUNT: 16.5 % (ref 10–15)
GFR SERPL CREATININE-BSD FRML MDRD: 88 ML/MIN/{1.73_M2}
GLUCOSE BLDC GLUCOMTR-MCNC: 65 MG/DL (ref 70–99)
GLUCOSE BLDC GLUCOMTR-MCNC: 75 MG/DL (ref 70–99)
GLUCOSE SERPL-MCNC: 82 MG/DL (ref 70–99)
HCT VFR BLD AUTO: 25.4 % (ref 40–53)
HGB BLD-MCNC: 8.3 G/DL (ref 13.3–17.7)
LABORATORY COMMENT REPORT: NORMAL
LABORATORY COMMENT REPORT: NORMAL
MCH RBC QN AUTO: 29.1 PG (ref 26.5–33)
MCHC RBC AUTO-ENTMCNC: 32.7 G/DL (ref 31.5–36.5)
MCV RBC AUTO: 89 FL (ref 78–100)
PLATELET # BLD AUTO: 89 10E9/L (ref 150–450)
POTASSIUM SERPL-SCNC: 4.3 MMOL/L (ref 3.4–5.3)
PROT SERPL-MCNC: 5.7 G/DL (ref 6.8–8.8)
RBC # BLD AUTO: 2.85 10E12/L (ref 4.4–5.9)
SARS-COV-2 RNA RESP QL NAA+PROBE: NEGATIVE
SARS-COV-2 RNA RESP QL NAA+PROBE: NEGATIVE
SODIUM SERPL-SCNC: 136 MMOL/L (ref 133–144)
SPECIMEN SOURCE: NORMAL
SPECIMEN SOURCE: NORMAL
WBC # BLD AUTO: 4.1 10E9/L (ref 4–11)

## 2021-04-08 PROCEDURE — 710N000010 HC RECOVERY PHASE 1, LEVEL 2, PER MIN: Performed by: SURGERY

## 2021-04-08 PROCEDURE — 250N000011 HC RX IP 250 OP 636

## 2021-04-08 PROCEDURE — 258N000001 HC RX 258: Performed by: PHYSICIAN ASSISTANT

## 2021-04-08 PROCEDURE — 250N000025 HC SEVOFLURANE, PER MIN: Performed by: SURGERY

## 2021-04-08 PROCEDURE — 80076 HEPATIC FUNCTION PANEL: CPT | Performed by: STUDENT IN AN ORGANIZED HEALTH CARE EDUCATION/TRAINING PROGRAM

## 2021-04-08 PROCEDURE — 250N000011 HC RX IP 250 OP 636: Performed by: STUDENT IN AN ORGANIZED HEALTH CARE EDUCATION/TRAINING PROGRAM

## 2021-04-08 PROCEDURE — 96365 THER/PROPH/DIAG IV INF INIT: CPT

## 2021-04-08 PROCEDURE — 272N000001 HC OR GENERAL SUPPLY STERILE: Performed by: SURGERY

## 2021-04-08 PROCEDURE — P9041 ALBUMIN (HUMAN),5%, 50ML: HCPCS | Performed by: STUDENT IN AN ORGANIZED HEALTH CARE EDUCATION/TRAINING PROGRAM

## 2021-04-08 PROCEDURE — 360N000075 HC SURGERY LEVEL 2, PER MIN: Performed by: SURGERY

## 2021-04-08 PROCEDURE — 370N000017 HC ANESTHESIA TECHNICAL FEE, PER MIN: Performed by: SURGERY

## 2021-04-08 PROCEDURE — U0003 INFECTIOUS AGENT DETECTION BY NUCLEIC ACID (DNA OR RNA); SEVERE ACUTE RESPIRATORY SYNDROME CORONAVIRUS 2 (SARS-COV-2) (CORONAVIRUS DISEASE [COVID-19]), AMPLIFIED PROBE TECHNIQUE, MAKING USE OF HIGH THROUGHPUT TECHNOLOGIES AS DESCRIBED BY CMS-2020-01-R: HCPCS | Performed by: STUDENT IN AN ORGANIZED HEALTH CARE EDUCATION/TRAINING PROGRAM

## 2021-04-08 PROCEDURE — 999N000141 HC STATISTIC PRE-PROCEDURE NURSING ASSESSMENT: Performed by: SURGERY

## 2021-04-08 PROCEDURE — 258N000003 HC RX IP 258 OP 636

## 2021-04-08 PROCEDURE — 258N000001 HC RX 258

## 2021-04-08 PROCEDURE — U0005 INFEC AGEN DETEC AMPLI PROBE: HCPCS | Performed by: STUDENT IN AN ORGANIZED HEALTH CARE EDUCATION/TRAINING PROGRAM

## 2021-04-08 PROCEDURE — 85027 COMPLETE CBC AUTOMATED: CPT | Performed by: STUDENT IN AN ORGANIZED HEALTH CARE EDUCATION/TRAINING PROGRAM

## 2021-04-08 PROCEDURE — 120N000002 HC R&B MED SURG/OB UMMC

## 2021-04-08 PROCEDURE — 999N001017 HC STATISTIC GLUCOSE BY METER IP

## 2021-04-08 PROCEDURE — 250N000009 HC RX 250: Performed by: SURGERY

## 2021-04-08 PROCEDURE — P9041 ALBUMIN (HUMAN),5%, 50ML: HCPCS

## 2021-04-08 PROCEDURE — 80048 BASIC METABOLIC PNL TOTAL CA: CPT | Performed by: STUDENT IN AN ORGANIZED HEALTH CARE EDUCATION/TRAINING PROGRAM

## 2021-04-08 PROCEDURE — 0WQF0ZZ REPAIR ABDOMINAL WALL, OPEN APPROACH: ICD-10-PCS | Performed by: SURGERY

## 2021-04-08 PROCEDURE — 250N000009 HC RX 250

## 2021-04-08 PROCEDURE — 96366 THER/PROPH/DIAG IV INF ADDON: CPT

## 2021-04-08 PROCEDURE — 36415 COLL VENOUS BLD VENIPUNCTURE: CPT | Performed by: STUDENT IN AN ORGANIZED HEALTH CARE EDUCATION/TRAINING PROGRAM

## 2021-04-08 RX ORDER — SODIUM CHLORIDE, SODIUM LACTATE, POTASSIUM CHLORIDE, CALCIUM CHLORIDE 600; 310; 30; 20 MG/100ML; MG/100ML; MG/100ML; MG/100ML
INJECTION, SOLUTION INTRAVENOUS CONTINUOUS
Status: DISCONTINUED | OUTPATIENT
Start: 2021-04-08 | End: 2021-04-08 | Stop reason: HOSPADM

## 2021-04-08 RX ORDER — NALOXONE HYDROCHLORIDE 0.4 MG/ML
0.4 INJECTION, SOLUTION INTRAMUSCULAR; INTRAVENOUS; SUBCUTANEOUS
Status: DISCONTINUED | OUTPATIENT
Start: 2021-04-08 | End: 2021-04-11 | Stop reason: HOSPADM

## 2021-04-08 RX ORDER — SPIRONOLACTONE 50 MG/1
50 TABLET, FILM COATED ORAL 2 TIMES DAILY
Status: DISCONTINUED | OUTPATIENT
Start: 2021-04-09 | End: 2021-04-11 | Stop reason: HOSPADM

## 2021-04-08 RX ORDER — CIPROFLOXACIN 500 MG/1
500 TABLET, FILM COATED ORAL DAILY
Status: DISCONTINUED | OUTPATIENT
Start: 2021-04-09 | End: 2021-04-09

## 2021-04-08 RX ORDER — PROCHLORPERAZINE MALEATE 5 MG
5 TABLET ORAL EVERY 6 HOURS PRN
Status: DISCONTINUED | OUTPATIENT
Start: 2021-04-08 | End: 2021-04-11 | Stop reason: HOSPADM

## 2021-04-08 RX ORDER — DOCUSATE SODIUM 100 MG/1
100 CAPSULE, LIQUID FILLED ORAL DAILY PRN
COMMUNITY

## 2021-04-08 RX ORDER — OXYCODONE HYDROCHLORIDE 5 MG/1
5 TABLET ORAL EVERY 4 HOURS PRN
Status: DISCONTINUED | OUTPATIENT
Start: 2021-04-08 | End: 2021-04-09

## 2021-04-08 RX ORDER — LACTULOSE 10 G/15ML
20 SOLUTION ORAL 2 TIMES DAILY
Status: DISCONTINUED | OUTPATIENT
Start: 2021-04-09 | End: 2021-04-11 | Stop reason: HOSPADM

## 2021-04-08 RX ORDER — ONDANSETRON 4 MG/1
4 TABLET, ORALLY DISINTEGRATING ORAL EVERY 30 MIN PRN
Status: DISCONTINUED | OUTPATIENT
Start: 2021-04-08 | End: 2021-04-08 | Stop reason: HOSPADM

## 2021-04-08 RX ORDER — DEXTROSE MONOHYDRATE 25 G/50ML
INJECTION, SOLUTION INTRAVENOUS PRN
Status: DISCONTINUED | OUTPATIENT
Start: 2021-04-08 | End: 2021-04-08

## 2021-04-08 RX ORDER — FENTANYL CITRATE 50 UG/ML
25-50 INJECTION, SOLUTION INTRAMUSCULAR; INTRAVENOUS
Status: DISCONTINUED | OUTPATIENT
Start: 2021-04-08 | End: 2021-04-08 | Stop reason: HOSPADM

## 2021-04-08 RX ORDER — LIDOCAINE 40 MG/G
CREAM TOPICAL
Status: DISCONTINUED | OUTPATIENT
Start: 2021-04-08 | End: 2021-04-11 | Stop reason: HOSPADM

## 2021-04-08 RX ORDER — PROPOFOL 10 MG/ML
INJECTION, EMULSION INTRAVENOUS PRN
Status: DISCONTINUED | OUTPATIENT
Start: 2021-04-08 | End: 2021-04-08

## 2021-04-08 RX ORDER — SPIRONOLACTONE 50 MG/1
50 TABLET, FILM COATED ORAL 2 TIMES DAILY
COMMUNITY

## 2021-04-08 RX ORDER — ALBUMIN HUMAN 5 %
INTRAVENOUS SOLUTION INTRAVENOUS PRN
Status: DISCONTINUED | OUTPATIENT
Start: 2021-04-08 | End: 2021-04-08

## 2021-04-08 RX ORDER — LIDOCAINE HYDROCHLORIDE 20 MG/ML
INJECTION, SOLUTION INFILTRATION; PERINEURAL PRN
Status: DISCONTINUED | OUTPATIENT
Start: 2021-04-08 | End: 2021-04-08

## 2021-04-08 RX ORDER — PIPERACILLIN SODIUM, TAZOBACTAM SODIUM 3; .375 G/15ML; G/15ML
3.38 INJECTION, POWDER, LYOPHILIZED, FOR SOLUTION INTRAVENOUS EVERY 6 HOURS
Status: DISCONTINUED | OUTPATIENT
Start: 2021-04-08 | End: 2021-04-08

## 2021-04-08 RX ORDER — NALOXONE HYDROCHLORIDE 0.4 MG/ML
0.2 INJECTION, SOLUTION INTRAMUSCULAR; INTRAVENOUS; SUBCUTANEOUS
Status: DISCONTINUED | OUTPATIENT
Start: 2021-04-08 | End: 2021-04-11 | Stop reason: HOSPADM

## 2021-04-08 RX ORDER — FUROSEMIDE 20 MG
20 TABLET ORAL 2 TIMES DAILY
Status: DISCONTINUED | OUTPATIENT
Start: 2021-04-09 | End: 2021-04-11 | Stop reason: HOSPADM

## 2021-04-08 RX ORDER — ONDANSETRON 2 MG/ML
4 INJECTION INTRAMUSCULAR; INTRAVENOUS EVERY 30 MIN PRN
Status: DISCONTINUED | OUTPATIENT
Start: 2021-04-08 | End: 2021-04-08 | Stop reason: HOSPADM

## 2021-04-08 RX ORDER — ACETAMINOPHEN 325 MG/1
650 TABLET ORAL EVERY 6 HOURS PRN
Status: DISCONTINUED | OUTPATIENT
Start: 2021-04-11 | End: 2021-04-09

## 2021-04-08 RX ORDER — ONDANSETRON 2 MG/ML
4 INJECTION INTRAMUSCULAR; INTRAVENOUS EVERY 6 HOURS PRN
Status: DISCONTINUED | OUTPATIENT
Start: 2021-04-08 | End: 2021-04-11 | Stop reason: HOSPADM

## 2021-04-08 RX ORDER — BUPIVACAINE HYDROCHLORIDE AND EPINEPHRINE 2.5; 5 MG/ML; UG/ML
INJECTION, SOLUTION INFILTRATION; PERINEURAL PRN
Status: DISCONTINUED | OUTPATIENT
Start: 2021-04-08 | End: 2021-04-08 | Stop reason: HOSPADM

## 2021-04-08 RX ORDER — SODIUM CHLORIDE, SODIUM GLUCONATE, SODIUM ACETATE, POTASSIUM CHLORIDE AND MAGNESIUM CHLORIDE 526; 502; 368; 37; 30 MG/100ML; MG/100ML; MG/100ML; MG/100ML; MG/100ML
INJECTION, SOLUTION INTRAVENOUS CONTINUOUS PRN
Status: DISCONTINUED | OUTPATIENT
Start: 2021-04-08 | End: 2021-04-08

## 2021-04-08 RX ORDER — ONDANSETRON 2 MG/ML
INJECTION INTRAMUSCULAR; INTRAVENOUS PRN
Status: DISCONTINUED | OUTPATIENT
Start: 2021-04-08 | End: 2021-04-08

## 2021-04-08 RX ORDER — CIPROFLOXACIN 2 MG/ML
400 INJECTION, SOLUTION INTRAVENOUS EVERY 12 HOURS
Status: DISCONTINUED | OUTPATIENT
Start: 2021-04-08 | End: 2021-04-09

## 2021-04-08 RX ORDER — ALBUMIN, HUMAN INJ 5% 5 %
500 SOLUTION INTRAVENOUS ONCE
Status: COMPLETED | OUTPATIENT
Start: 2021-04-08 | End: 2021-04-08

## 2021-04-08 RX ORDER — DOCUSATE SODIUM 100 MG/1
100 CAPSULE, LIQUID FILLED ORAL DAILY PRN
Status: DISCONTINUED | OUTPATIENT
Start: 2021-04-09 | End: 2021-04-11 | Stop reason: HOSPADM

## 2021-04-08 RX ORDER — OXYCODONE HYDROCHLORIDE 5 MG/1
5 TABLET ORAL EVERY 6 HOURS PRN
Status: DISCONTINUED | OUTPATIENT
Start: 2021-04-08 | End: 2021-04-09

## 2021-04-08 RX ORDER — FENTANYL CITRATE 50 UG/ML
INJECTION, SOLUTION INTRAMUSCULAR; INTRAVENOUS PRN
Status: DISCONTINUED | OUTPATIENT
Start: 2021-04-08 | End: 2021-04-08

## 2021-04-08 RX ORDER — LIDOCAINE HYDROCHLORIDE 10 MG/ML
INJECTION, SOLUTION INFILTRATION; PERINEURAL PRN
Status: DISCONTINUED | OUTPATIENT
Start: 2021-04-08 | End: 2021-04-08 | Stop reason: HOSPADM

## 2021-04-08 RX ORDER — ONDANSETRON 4 MG/1
4 TABLET, ORALLY DISINTEGRATING ORAL EVERY 6 HOURS PRN
Status: DISCONTINUED | OUTPATIENT
Start: 2021-04-08 | End: 2021-04-11 | Stop reason: HOSPADM

## 2021-04-08 RX ORDER — HYDROMORPHONE HYDROCHLORIDE 1 MG/ML
.3-.5 INJECTION, SOLUTION INTRAMUSCULAR; INTRAVENOUS; SUBCUTANEOUS EVERY 5 MIN PRN
Status: DISCONTINUED | OUTPATIENT
Start: 2021-04-08 | End: 2021-04-08 | Stop reason: HOSPADM

## 2021-04-08 RX ORDER — LACTULOSE 10 G/15ML
20 SOLUTION ORAL 2 TIMES DAILY
Status: DISCONTINUED | OUTPATIENT
Start: 2021-04-08 | End: 2021-04-08

## 2021-04-08 RX ORDER — DEXTROSE MONOHYDRATE, SODIUM CHLORIDE, AND POTASSIUM CHLORIDE 50; 1.49; 9 G/1000ML; G/1000ML; G/1000ML
INJECTION, SOLUTION INTRAVENOUS CONTINUOUS
Status: DISCONTINUED | OUTPATIENT
Start: 2021-04-08 | End: 2021-04-08 | Stop reason: HOSPADM

## 2021-04-08 RX ORDER — FUROSEMIDE 20 MG
20 TABLET ORAL 2 TIMES DAILY
Status: DISCONTINUED | OUTPATIENT
Start: 2021-04-08 | End: 2021-04-08

## 2021-04-08 RX ORDER — SPIRONOLACTONE 50 MG/1
50 TABLET, FILM COATED ORAL 2 TIMES DAILY
Status: DISCONTINUED | OUTPATIENT
Start: 2021-04-08 | End: 2021-04-08

## 2021-04-08 RX ADMIN — DEXTROSE MONOHYDRATE 12.5 G: 25 INJECTION, SOLUTION INTRAVENOUS at 19:39

## 2021-04-08 RX ADMIN — CIPROFLOXACIN 400 MG: 2 INJECTION, SOLUTION INTRAVENOUS at 19:00

## 2021-04-08 RX ADMIN — LIDOCAINE HYDROCHLORIDE 100 MG: 20 INJECTION, SOLUTION INFILTRATION; PERINEURAL at 19:56

## 2021-04-08 RX ADMIN — ALBUMIN (HUMAN) 250 ML: 12.5 SOLUTION INTRAVENOUS at 20:11

## 2021-04-08 RX ADMIN — FENTANYL CITRATE 25 MCG: 50 INJECTION, SOLUTION INTRAMUSCULAR; INTRAVENOUS at 20:36

## 2021-04-08 RX ADMIN — POTASSIUM CHLORIDE, DEXTROSE MONOHYDRATE AND SODIUM CHLORIDE: 150; 5; 900 INJECTION, SOLUTION INTRAVENOUS at 13:30

## 2021-04-08 RX ADMIN — FENTANYL CITRATE 100 MCG: 50 INJECTION, SOLUTION INTRAMUSCULAR; INTRAVENOUS at 19:56

## 2021-04-08 RX ADMIN — ALBUMIN HUMAN 500 ML: 0.05 INJECTION, SOLUTION INTRAVENOUS at 19:00

## 2021-04-08 RX ADMIN — PHENYLEPHRINE HYDROCHLORIDE 100 MCG: 10 INJECTION INTRAVENOUS at 20:25

## 2021-04-08 RX ADMIN — METRONIDAZOLE 500 MG: 500 INJECTION, SOLUTION INTRAVENOUS at 18:15

## 2021-04-08 RX ADMIN — SODIUM CHLORIDE, SODIUM GLUCONATE, SODIUM ACETATE, POTASSIUM CHLORIDE AND MAGNESIUM CHLORIDE: 526; 502; 368; 37; 30 INJECTION, SOLUTION INTRAVENOUS at 19:44

## 2021-04-08 RX ADMIN — PROPOFOL 60 MG: 10 INJECTION, EMULSION INTRAVENOUS at 19:57

## 2021-04-08 RX ADMIN — ONDANSETRON 4 MG: 2 INJECTION INTRAMUSCULAR; INTRAVENOUS at 20:24

## 2021-04-08 ASSESSMENT — ENCOUNTER SYMPTOMS
WOUND: 1
FEVER: 0
CONFUSION: 0
BLOOD IN STOOL: 0

## 2021-04-08 NOTE — ED NOTES
Pt's sheets and gown wet. Stood pt up to change sheet with assist of ERT. Upon standing, copious amounts of clear fluid poring onto floor. ABD applied, pt soaking through dressing. MERARI Srivastava notified. Pt reports improvement in abd pain/distention. VSS.

## 2021-04-08 NOTE — ED TRIAGE NOTES
Pt arrives via EMS from home d/t abd pain/bloating. Pt lives at home with son. Scheduled to have paracentesis tomorrow. Per EMS, pt leaking clear fluid from hernia. VSS. No fevers or acute confusion. ABC intact. Primary language [place]. A&O x4.

## 2021-04-08 NOTE — PHARMACY-ADMISSION MEDICATION HISTORY
Admission medication history interview status for this patient is complete. See [facility] admission navigator for allergy information, prior to admission medications and immunization status.     Medication history interview done, indicate source(s): Family - son, Karl (Ph [phone])  Medication history resources (including written lists, pill bottles, clinic record): SureScripts and Care Everywhere  Pharmacy: [facility]    Changes made to PTA medication list:  Added: spironolactone and docusate  Deleted: iron  Changed: Lactulose can be decreased to once daily if 2 stools per day.     Actions taken by pharmacist (provider contacted, etc): Called patient's son to verify home med list     Additional medication history information: Patient took 3 capsules of clindamycin 300mg QID x 5 days (prescribed 4/5) but then discontinued due to diarrhea.     Medication reconciliation/reorder completed by provider prior to medication history?  N   (Y/N)       Prior to Admission medications    Medication Sig Last Dose Taking? Auth Provider   ciprofloxacin (CIPRO) 500 MG tablet Take 1 tablet (500 mg) by mouth daily For SBP prophylaxis 4/7/2021 at am Yes Rad Ulrich MD   docusate sodium (COLACE) 100 MG capsule Take 100 mg by mouth daily as needed for constipation Past Month at Unknown time Yes Unknown, Entered By History   furosemide (LASIX) 20 MG tablet Take 20 mg by mouth 2 times daily  4/7/2021 at Unknown time Yes Reported, Patient   lactulose (CONSTULOSE) 10 GM/15ML solution Take 20 g by mouth 2 times daily May decrease to once per day if 2 stools per day 4/7/2021 at am Yes Reported, Patient   spironolactone (ALDACTONE) 50 MG tablet Take 50 mg by mouth 2 times daily 4/7/2021 at am Yes Unknown, Entered By History

## 2021-04-08 NOTE — PHARMACY-ADMISSION MEDICATION HISTORY
Admission medication history completed at [facility] 4/8 am.  See below for details:      Admission medication history interview status for this patient is complete. See [facility] admission navigator for allergy information, prior to admission medications and immunization status.      Medication history interview done, indicate source(s): Family - son, Karl (Ph [phone])  Medication history resources (including written lists, pill bottles, clinic record): SureScripts and Care Everywhere  Pharmacy: [facility]     Changes made to PTA medication list:  Added: spironolactone and docusate  Deleted: iron  Changed: Lactulose can be decreased to once daily if 2 stools per day.      Actions taken by pharmacist (provider contacted, etc): Called patient's son to verify home med list      Additional medication history information: Patient took 3 capsules of clindamycin 300mg QID x 5 days (prescribed 4/5) but then discontinued due to diarrhea.      Medication reconciliation/reorder completed by provider prior to medication history?  N   (Y/N)                Prior to Admission medications    Medication Sig Last Dose Taking? Auth Provider   ciprofloxacin (CIPRO) 500 MG tablet Take 1 tablet (500 mg) by mouth daily For SBP prophylaxis 4/7/2021 at am Yes Rad Ulrich MD   docusate sodium (COLACE) 100 MG capsule Take 100 mg by mouth daily as needed for constipation Past Month at Unknown time Yes Unknown, Entered By History   furosemide (LASIX) 20 MG tablet Take 20 mg by mouth 2 times daily  4/7/2021 at Unknown time Yes Reported, Patient   lactulose (CONSTULOSE) 10 GM/15ML solution Take 20 g by mouth 2 times daily May decrease to once per day if 2 stools per day 4/7/2021 at am Yes Reported, Patient   spironolactone (ALDACTONE) 50 MG tablet Take 50 mg by mouth 2 times daily 4/7/2021 at am Yes Unknown, Entered By History

## 2021-04-08 NOTE — ED NOTES
Pt given warm blanket. ERT took off ABD pad, as it was 100% saturated and placed chux pad and towels under. Awaiting placement at [facility].

## 2021-04-08 NOTE — ED NOTES
Report called to Veronica, [phone], at [facility]. Son, Karl, updated with plan and address of unit. Pt remains sleeping.

## 2021-04-08 NOTE — ED NOTES
ABD soaked with clear liquid. Pt cleaned and new dressing applied. Pt repositioned. Lights dimmed. Side rails up x2. Call light and urinal within reach.

## 2021-04-08 NOTE — ED PROVIDER NOTES
Emergency Department Attending Supervision Note  4/7/2021  10:12 PM      I evaluated this patient in conjunction with Az Srivastava PA-C.      Briefly, the patient presented via EMS with clear fluid leakage from his umbilical hernia which began leaking around 5752-0584 today. He states this hernia usually leaks 1-2 times per month. He states the leakage today is more forceful. There is a wound at the tip. Denies pain. Per patient's son, there is no increased confusion, fever, or blood in stool.     On my exam,    General:  No respiratory distress    Cardiovascular: Good cap refill.    Respiratory: Breathing non labored.     Gastrointestinal: Abdomen soft. No guarding, no rebound. Large abdominal pain with fluid weight. Umbilical hernia with ulcer at the hernia tip. Leakage of peritoneal fluid from that tip.     Musculoskeletal: No tenderness. No bony deformity.     Skin: No rashes or petechiae     Neurologic: non focal      Psychiatric: Appropriate     Results:  CMP: Glucose 119(H), Calcium 7.3(L), Bilirubin Total 1.4(H), Albumin 1.5(L), Protein Total 6.3(L), AST 46(H), o/w WNL (Creatinine 0.78)    CBC: WBC 4.7, HGB 8.5(L), PLT 96(L)   INR: 2.07(H)  Lactic acid (result time 2230) 1.7      Asymptomatic COVID19 Virus PCR by nasopharyngeal swab pending     ED course:  2205: Az Srivastava requested to staff the patient with me. Please see their note for details.      2205: I performed an exam of the patient, as documented above.     2329: I spoke with Az Srivastava. Patient will be transferred to the [facility] for a surgical closure to prevent SBP.     My impression is a hernia with ulceration leading to a potential site for entry of bacteria to cause SBP.  The patient will need to be transferred to the  for surgical intervention.  Currently he has abdomen has been drained here spontaneously he is otherwise stable        Diagnosis    ICD-10-CM    1. Ascitic fluid  R18.8 Comprehensive metabolic panel     INR   2. Continuous  leakage  N39.45      3.  Cirrhosis of the liver  4.  Umbilical hernia with skin breakdown.      I, Wendy Bass, am serving as a scribe at 11:15 PM on 4/7/2021 to document services personally performed by Santos Cisse MD based on my observations and the provider's statements to me.        Santos Cisse MD  04/07/21 5640

## 2021-04-08 NOTE — ED NOTES
Bed: ED36  Expected date: 4/7/21  Expected time: 8:57 PM  Means of arrival: Ambulance  Comments:  Shanika Todd

## 2021-04-08 NOTE — ED NOTES
I received signout on this patient from Dr. Mcclelland, who received signout from Dr. Cisse, who staffed the patient with Az Srivastava PA-C (who saw the patient initially).  Briefly, the patient presented with an abdominal hernia that was leaking ascitic fluid.  General surgery was consulted at [facility] who recommended the patient be transferred to the [facility] given the risk for spontaneous bacterial peritonitis based on the fluid draining but is now open to the external environment.  General surgery was contacted at [facility] who accepted the patient for admission and planned operation on April 8, 2021.     Patient Vitals for the past 24 hrs:   BP Temp Temp src Pulse Resp SpO2   04/08/21 1500 101/44 -- -- 72 -- 100 %   04/08/21 1330 106/47 -- -- 81 -- 100 %   04/08/21 1315 -- -- -- -- -- 100 %   04/08/21 1300 -- -- -- -- -- 100 %   04/08/21 1245 -- -- -- -- -- 100 %   04/08/21 1230 -- -- -- -- -- 100 %   04/08/21 1215 -- -- -- -- -- 100 %   04/08/21 1200 -- -- -- -- -- 100 %   04/08/21 1145 -- -- -- -- -- 100 %   04/08/21 1130 -- -- -- -- -- 100 %   04/08/21 1115 -- -- -- -- -- 100 %   04/08/21 1100 -- -- -- -- -- 100 %   04/08/21 1045 -- -- -- -- -- 100 %   04/08/21 1030 -- -- -- -- -- 100 %   04/08/21 1015 -- -- -- -- -- 100 %   04/08/21 0955 98/45 -- -- 81 -- 100 %   04/08/21 0945 -- -- -- -- -- 100 %   04/08/21 0930 -- -- -- -- -- 100 %   04/08/21 0915 -- -- -- -- -- 100 %   04/08/21 0900 -- -- -- -- 16 100 %   04/08/21 0845 -- -- -- -- -- 100 %   04/08/21 0830 -- -- -- -- -- 100 %   04/08/21 0815 -- -- -- -- 16 100 %   04/08/21 0800 -- -- -- 80 -- 100 %   04/08/21 0745 -- -- -- -- -- 100 %   04/08/21 0730 -- -- -- -- -- 100 %   04/08/21 0715 -- -- -- -- -- 100 %   04/08/21 0700 -- -- -- 81 -- 100 %   04/08/21 0615 110/55 -- -- 88 16 100 %   04/08/21 0500 113/50 -- -- 84 18 100 %   04/08/21 0400 98/59 -- -- 79 18 100 %   04/08/21 0300 107/54 -- -- 84 14 100 %   04/08/21 0230 --  -- -- -- -- 100 %   04/08/21 0215 -- -- -- -- -- 100 %   04/08/21 0200 133/62 -- -- -- -- 100 %   04/08/21 0150 133/62 -- -- -- -- --   04/08/21 0130 -- -- -- -- -- 100 %   04/08/21 0120 -- -- -- -- -- 100 %   04/08/21 0115 -- -- -- -- -- 100 %   04/08/21 0110 -- -- -- -- -- 100 %   04/08/21 0100 122/54 -- -- 90 -- 100 %   04/08/21 0045 -- -- -- -- -- 100 %   04/08/21 0030 -- -- -- -- -- 100 %   04/08/21 0015 121/69 -- -- 91 -- 100 %   04/08/21 0000 124/54 -- -- 83 16 100 %   04/07/21 2345 111/57 -- -- 84 -- 100 %   04/07/21 2330 119/55 -- -- 85 -- 100 %   04/07/21 2325 -- -- -- -- -- 100 %   04/07/21 2320 -- -- -- -- -- 100 %   04/07/21 2315 110/52 -- -- 81 -- 100 %   04/07/21 2300 115/52 -- -- 84 -- 100 %   04/07/21 2255 -- -- -- -- -- 100 %   04/07/21 2250 -- -- -- -- -- 100 %   04/07/21 2245 131/59 -- -- 92 -- 100 %   04/07/21 2240 -- -- -- -- -- 100 %   04/07/21 2235 133/55 -- -- 92 -- 100 %   04/07/21 2200 -- -- -- -- -- 100 %   04/07/21 2155 -- -- -- -- -- 100 %   04/07/21 2130 122/50 98.3  F (36.8  C) Temporal 92 16 100 %     Constitutional: Vital signs reviewed as above.     Gastrointestinal:    Soft.    Bowel sounds normal.    There is no mild distension.    There is an umbilical hernia with ulcerative/granulation tissue at the tip.   There is no rebound or guarding.   Skin: There is an umbilical hernia with ulcerative/granulation tissue at the tip. There is leakage of ascitic fluid.  Psychiatric: The patient appears calm      ED Course as of Apr 08 1515   Thu Apr 08, 2021   0645 Received s/o from Dr. Mcclelland.  Patient has reportedly been accepted for surgical intervention at the [facility] but boarded here overnight as there were no beds at the [place] nor anywhere in the [place] metro area.      0720 D/W Dr. Barlow.  He notes that the patient should have surgery that would include hernia repair without mesh and while ideally this would be done today, if it does not happen until  tomorrow that probably is not critical.  He notes that it is a fairly conceptually straightforward procedure though he notes that he has not personally evaluated the patient.       1518 Patient signed back over to Az Srivastava PA-C. Patient should be heading to the [facility] very soon.        Impression    ICD-10-CM    1. Ascitic fluid  R18.8 Comprehensive metabolic panel     INR     Asymptomatic SARS-CoV-2 COVID-19 Virus (Coronavirus) by PCR     Glucose by meter     Glucose by meter   2. Continuous leakage  N39.45    3. Umbilical hernia without obstruction and without gangrene  K42.9             Fernando Rossi,   04/08/21 1515

## 2021-04-08 NOTE — ED NOTES
Spoke to patient's son, Karl, who gave verbal consent for patient to transfer. Due to patient's language barrier and Alzheimer's disease, this was deemed most appropriate. This was witnessed with Ashley JARAMILLO RN who spoke to son as well. Blood glucose check: 75. Will start on maintenance fluids per provider.

## 2021-04-08 NOTE — ED NOTES
Karl Zamudio, son, went home for the night. Plan is to transfer in the morning.    Son--[phone]    Pt's primary language--Delvis

## 2021-04-08 NOTE — ED PROVIDER NOTES
History   Chief Complaint:  Hernia     The history is provided by the patient. The history is limited by a language barrier (Tigrinya). A  was used.      oRbb Vallejo is a 81 year old male with history of WOOWDARD, hypertension, and Alzheimer who presents via EMS with clear fluid leakage from his umbilical hernia. It began leaking ~7819-5030 today. This hernia typically leaks 1-2x monthly however the leakage is more forceful today. There is also a wound at the tip. He gets recurrent paracentesis for this and is due for one tomorrow. He denies any pain. He lives with his son. Per son, there is no increased confusion, fever, or dark stools or vomiting.  He has a history of prior SBP and is on Cipro for prophylaxis as well as was recently started on clindamycin by his primary for possible skin infection at the site.  He was previously deemed not to be a candidate for repair of the umbilical hernia.  He was previously on Coumadin but is no longer taking this for some time now.    Review of Systems   Constitutional: Negative for fever.   Gastrointestinal: Negative for blood in stool.   Skin: Positive for wound.   Psychiatric/Behavioral: Negative for confusion.   All other systems reviewed and are negative.    Allergies:  Propranolol  Penicillins    Medications:  Cipro  Aldactone  Cleocin    Past Medical History:    Cirrhosis  Dementia  Hypertension  Lactic acidosis  Umbilical hernia  Hepatocellular carcinoma  Anemia  Esophageal varices  Anemia  Thrombocytopenia  Pancytopenia  Hearing loss  Alzheimer     Past Surgical History:    Cataract IOL  Banding esophageal varices  Paracentesis  TIPS procedure    Social History:  Patient presents alone via EMS.  He speaks Tigrinya.  He lives with his son.    Physical Exam     Patient Vitals for the past 24 hrs:   BP Temp Temp src Pulse Resp SpO2   04/07/21 2300 115/52 -- -- 84 -- 100 %   04/07/21 2255 -- -- -- -- -- 100 %   04/07/21 2250 -- -- -- -- -- 100 %    04/07/21 2245 131/59 -- -- 92 -- 100 %   04/07/21 2240 -- -- -- -- -- 100 %   04/07/21 2235 133/55 -- -- 92 -- 100 %   04/07/21 2200 -- -- -- -- -- 100 %   04/07/21 2155 -- -- -- -- -- 100 %   04/07/21 2130 122/50 98.3  F (36.8  C) Temporal 92 16 100 %       Physical Exam  General: Awake, alert, pleasant, non-toxic.  Head:  Scalp is NC/AT  Eyes:  Conjunctiva normal, PERRL  ENT:  The external nose and ears are normal.   Neck:  Normal range of motion without rigidity.  CV:  Regular rate and rhythm    No pathologic murmur, rubs, or gallops.  Resp:  Breath sounds are clear bilaterally.  No crackles, wheezes, rhonchi, stridor.    Non-labored, no retractions or accessory muscle use  Abdomen: Abdomen is soft, distended, there is a umbilical hernia present with open wound leaking large volume ascitic fluid approximately 5 to 10 mm in diameter with some macerated tissue around it.  There does not appear to be any bowel or mass present within this hernia.  No tenderness,.  MS:  No lower extremity edema or asymmetric calf swelling. Normal ROM in all joints without effusions.    No midline cervical, thoracic, or lumbar tenderness  Skin:  Warm and dry, No rash or lesions noted. 2+ peripheral pulses in all extremities  Neuro: Alert and oriented x3.  No gross motor deficits.  No facial asymmetry.  No asterixis.  Psych: Awake. Alert. Normal affect. Appropriate interactions.    Emergency Department Course     Laboratory:  CMP: Glucose 119(H), Calcium 7.3(L), Bilirubin Total 1.4(H), Albumin 1.5(L), Protein Total 6.3(L), AST 46(H), o/w WNL (Creatinine 0.78)    CBC: WBC 4.7, HGB 8.5(L), PLT 96(L)   INR: 2.07(H)  Lactic acid (result time 2230) 1.7     Asymptomatic COVID19 Virus PCR by nasopharyngeal swab pending     Emergency Department Course:    Reviewed:  I reviewed nursing notes, vitals, past medical history and care everywhere    Assessments:  2105 I obtained history and examined the patient as noted above.   2119 I rechecked the  patient and spoke with his son.    I rechecked the patient.    Consults:    I spoke with Dr. Gordon of general surgery.   I spoke with Dr. Hinds of general surgery.    Disposition:  Patient was accepted by Dr. Hinds of general surgery for transfer to the [facility] for admission and planned operation tomorrow.  Unfortunately no beds available at [facility] but will have bed tomorrow for transfer.  Patient to [facility] as no other beds available in the metropolitan area at tertiary [facility]'s.      Impression & Plan     Medical Decision Makin-year-old male with complex past medical history including Chase with recurrent ascites and paracentesis and SBP presents for significant leakage of fluid from umbilical hernia and abdomen.  This is consistent with ascitic fluid leak.  He denies any other abdominal pain and does not have evidence of other intra-abdominal catastrophe such as bowel obstruction, incarcerated hernia, appendicitis, diverticulitis, pancreatitis, cholecystitis.  He denies any fever or abdominal pain and has no leukocytosis and low suspicion for SBP at this time as he notes he has continued to take his prophylaxis.  Discussed with general surgery here at [facility] who advised that prompt operative repair was indicated for this to prevent ongoing leak as this is unlikely to heal, is likely to continue to worsen as ascites reaccumulates, and also places the patient at risk for episodes of SBP given open communication with environment.  Unfortunately they do not perform this procedure here at [facility].  They recommended transfer to the [facility] for surgical consultation for repair.  I spoke with general surgery at the [facility] who agrees to accept the patient for transfer with planned surgery tomorrow and n.p.o. after midnight.  Unfortunately after general surgery accepted the patient to the [place]  [place] indicated that they do not have any beds available for transfer until tomorrow.  I did attempt to look around the [facility] and there were no beds available at any other tertiary Medical Center including [facility].  For this reason decision was made for the patient to board here in the [facility] emergency department.  Again he is already accepted at the [facility] and will be transferred once bed availability permits.  The patient and his son expressed an understanding of this.    Covid-19  Robb Vallejo was evaluated during a global COVID-19 pandemic, which necessitated consideration that the patient might be at risk for infection with the SARS-CoV-2 virus that causes COVID-19.   Applicable protocols for evaluation were followed during the patient's care.   COVID-19 was considered as part of the patient's evaluation. The plan for testing is:  a test was obtained during this visit.    Diagnosis:    ICD-10-CM    1. Ascitic fluid  R18.8 Comprehensive metabolic panel     INR   2. Continuous leakage  N39.45          Scribe Disclosure:  I, Juna Farias, am serving as a scribe at 9:19 PM on 4/7/2021 to document services personally performed by Az Srivastava PA-C based on my observations and the provider's statements to me.            Az Srivastava PA-C  04/08/21 0018       Santos Cisse MD  04/08/21 0048

## 2021-04-08 NOTE — H&P
[facility] Surgery Admission History and Physical     Robb Vallejo MRN# [id number]   YOB: 1940 Age: 81 year old      Date of Admission:  4/8/2021    CC: umbilical hernia     Assessment: 82 y/o m w/ PMH of cirrhosis secondary to WOODWARD receiving weekly paracentesis s/p TIPS 12/2020, HCC s/p radio embolization, anemia, thrombocytopenia, alzheimer's, umbilical hernia, who presents for direct admission for concerns of a direct connection through his umbilical hernia to the peritoneal cavity. His vitals are stable, has no leukocytosis, and an exam consistent w/ an ulcerated umbilical hernia w/ a tract putting out significant ascites fluid. He has a class C child-pughs score of 11 based on today's labs. At this time there is not concern of intraabdominal infection. He was started on clindamycin for possible infection at the site of his umbilical hernia, we will likely place him on abx for a few days to treat this.            Plan:  -urgent OR scheduling of open hernia repair   -cipro and flagyl for SBP ppx  -x1 500mL 5% albumin prior to procedure  -NPO for surgery  -son is available by phone for translation  -admit to   -home medications ordered for tomorrow     HPI: 82 y/o Tigrinya speaking m w/ PMH of cirrhosis secondary to WOODWARD receiving weekly paracentesis s/p TIPS 12/2020, HCC s/p radio embolization, anemia, thrombocytopenia, alzheimer's, umbilical hernia, who presents for direct admission for concerns of ascites leakage from his umbilical hernia.      Mr. Vallejo was interviewed with the aid of his son who speaks Tigrinya, we were unable to find an  with this language capability. Mr. Vallejo states that yesterday he had significant yellow clear fluid output from his umbilical hernia. He has had output from this hernia before, but it was only 1-2x/ month and was minimal. Note he was started on clindamycin 04/05 for concerns of infection of the overlying umbilical hernia skin. He  presented to the [facility] ED where he was evaluated and found to have an ulcerated umbilical hernia that was leaking fluid consistent w/ ascites. He did not have symptoms consistent with SBP, but general surgery was consulted advising prompt operative repair given the increased risk of SBP episodes with an open communication. They recommended transfer to [facility] for urgent surgical repair. He was boarded in the ED and transferred this afternoon.    Today, Mr. Vallejo feels well, he denies fevers, chills, N/V, abdominal pain, chest pain, SOB. He states his umbilical hernia has not changed significantly over the past few months although could be a bit larger. He was due for paracentesis today. He does posit some constipation as he has not had a BM since Monday, he does state he is taking his lactulose as prescribed. His voids well. Denies blood in the stool or urine. He has been NPO since midnight.           Past Medical History:  Past Medical History:   Diagnosis Date     Cirrhosis (H)      Dementia      Hypertension        Past Surgical History:  No past surgical history on file.    Allergies:     Allergies   Allergen Reactions     Penicillins Other (See Comments)     Syncope         Medications:  No current facility-administered medications on file prior to encounter.   ciprofloxacin (CIPRO) 500 MG tablet, Take 1 tablet (500 mg) by mouth daily For SBP prophylaxis  docusate sodium (COLACE) 100 MG capsule, Take 100 mg by mouth daily as needed for constipation  furosemide (LASIX) 20 MG tablet, Take 20 mg by mouth 2 times daily   lactulose (CONSTULOSE) 10 GM/15ML solution, Take 20 g by mouth 2 times daily May decrease to once per day if 2 stools per day  spironolactone (ALDACTONE) 50 MG tablet, Take 50 mg by mouth 2 times daily        Medications prior to Admission:  Medications Prior to Admission   Medication Sig Dispense Refill Last Dose     ciprofloxacin (CIPRO) 500 MG tablet Take 1 tablet (500 mg) by mouth  daily For SBP prophylaxis 30 tablet 0      docusate sodium (COLACE) 100 MG capsule Take 100 mg by mouth daily as needed for constipation        furosemide (LASIX) 20 MG tablet Take 20 mg by mouth 2 times daily         lactulose (CONSTULOSE) 10 GM/15ML solution Take 20 g by mouth 2 times daily May decrease to once per day if 2 stools per day        spironolactone (ALDACTONE) 50 MG tablet Take 50 mg by mouth 2 times daily          Social History:  Social History     Socioeconomic History     Marital status:      Spouse name: Not on file     Number of children: Not on file     Years of education: Not on file     Highest education level: Not on file   Occupational History     Not on file   Social Needs     Financial resource strain: Not on file     Food insecurity     Worry: Not on file     Inability: Not on file     Transportation needs     Medical: Not on file     Non-medical: Not on file   Tobacco Use     Smoking status: Unknown If Ever Smoked     Tobacco comment: Current non-smoker.   Substance and Sexual Activity     Alcohol use: No     Alcohol/week: 0.0 standard drinks     Drug use: No     Sexual activity: Not on file   Lifestyle     Physical activity     Days per week: Not on file     Minutes per session: Not on file     Stress: Not on file   Relationships     Social connections     Talks on phone: Not on file     Gets together: Not on file     Attends Buddhist service: Not on file     Active member of club or organization: Not on file     Attends meetings of clubs or organizations: Not on file     Relationship status: Not on file     Intimate partner violence     Fear of current or ex partner: Not on file     Emotionally abused: Not on file     Physically abused: Not on file     Forced sexual activity: Not on file   Other Topics Concern     Not on file   Social History Narrative    4/4/2016: he and his wife live with his adult son and son's family. Independent with all ADLs. Ambulates independently.  Speaks [place], non-English speaker.       Family History:  No family history on file.    ROS:  The remainder of the complete ROS was negative unless noted in the HPI.    Exam:  /48 (BP Location: Right arm)   Pulse 77   Temp 96.1  F (35.6  C) (Oral)   Resp 14   SpO2 100%   General: Alert, interactive, NAD  Resp: CTAB, no crackles or wheezes  Cardiac: RRR, NS1,S2, moderate aortic stenosis murmur   Abdomen: Soft, nontender, nondistended. +BS. Umbilical hernia 2-3cm in diameter, reducible, non-tender. Hernia is ulcerated at the midline most superficial portion w/ evidence of a tract leaking significant amounts of clear yellow fluid.   Extremities: No LE edema or obvious joint abnormalities  Skin: Warm and dry, no jaundice or rash    Labs:  All laboratory data reviewed  Imaging:    EKG:          Grzegorz Block MD  PGY-1     Seen and discussed with Dr. Kennedy on 04/08/2021

## 2021-04-09 ENCOUNTER — APPOINTMENT (OUTPATIENT)
Dept: PHYSICAL THERAPY | Facility: CLINIC | Age: 81
DRG: 354 | End: 2021-04-09
Attending: SURGERY
Payer: MEDICARE

## 2021-04-09 LAB
ABO + RH BLD: NORMAL
ABO + RH BLD: NORMAL
ANION GAP SERPL CALCULATED.3IONS-SCNC: 8 MMOL/L (ref 3–14)
BLD GP AB SCN SERPL QL: NORMAL
BLD PROD TYP BPU: NORMAL
BLD PROD TYP BPU: NORMAL
BLD UNIT ID BPU: 0
BLOOD BANK CMNT PATIENT-IMP: NORMAL
BLOOD PRODUCT CODE: NORMAL
BPU ID: NORMAL
BUN SERPL-MCNC: 9 MG/DL (ref 7–30)
CALCIUM SERPL-MCNC: 7.5 MG/DL (ref 8.5–10.1)
CHLORIDE SERPL-SCNC: 109 MMOL/L (ref 94–109)
CO2 SERPL-SCNC: 19 MMOL/L (ref 20–32)
CREAT SERPL-MCNC: 0.75 MG/DL (ref 0.66–1.25)
ERYTHROCYTE [DISTWIDTH] IN BLOOD BY AUTOMATED COUNT: 16.7 % (ref 10–15)
GFR SERPL CREATININE-BSD FRML MDRD: 87 ML/MIN/{1.73_M2}
GLUCOSE BLDC GLUCOMTR-MCNC: 124 MG/DL (ref 70–99)
GLUCOSE BLDC GLUCOMTR-MCNC: 75 MG/DL (ref 70–99)
GLUCOSE SERPL-MCNC: 99 MG/DL (ref 70–99)
HCT VFR BLD AUTO: 21.6 % (ref 40–53)
HGB BLD-MCNC: 6.6 G/DL (ref 13.3–17.7)
HGB BLD-MCNC: 6.6 G/DL (ref 13.3–17.7)
MCH RBC QN AUTO: 29.1 PG (ref 26.5–33)
MCHC RBC AUTO-ENTMCNC: 30.6 G/DL (ref 31.5–36.5)
MCV RBC AUTO: 95 FL (ref 78–100)
NUM BPU REQUESTED: 1
PLATELET # BLD AUTO: 54 10E9/L (ref 150–450)
POTASSIUM SERPL-SCNC: 3.9 MMOL/L (ref 3.4–5.3)
PROT SERPL-MCNC: 5.2 G/DL (ref 6.8–8.8)
RBC # BLD AUTO: 2.27 10E12/L (ref 4.4–5.9)
SODIUM SERPL-SCNC: 136 MMOL/L (ref 133–144)
SPECIMEN EXP DATE BLD: NORMAL
TRANSFUSION STATUS PATIENT QL: NORMAL
TRANSFUSION STATUS PATIENT QL: NORMAL
WBC # BLD AUTO: 2.8 10E9/L (ref 4–11)

## 2021-04-09 PROCEDURE — 86900 BLOOD TYPING SEROLOGIC ABO: CPT | Performed by: SURGERY

## 2021-04-09 PROCEDURE — 86850 RBC ANTIBODY SCREEN: CPT | Performed by: SURGERY

## 2021-04-09 PROCEDURE — 85027 COMPLETE CBC AUTOMATED: CPT | Performed by: STUDENT IN AN ORGANIZED HEALTH CARE EDUCATION/TRAINING PROGRAM

## 2021-04-09 PROCEDURE — 85018 HEMOGLOBIN: CPT | Performed by: STUDENT IN AN ORGANIZED HEALTH CARE EDUCATION/TRAINING PROGRAM

## 2021-04-09 PROCEDURE — 250N000013 HC RX MED GY IP 250 OP 250 PS 637

## 2021-04-09 PROCEDURE — 86901 BLOOD TYPING SEROLOGIC RH(D): CPT | Performed by: SURGERY

## 2021-04-09 PROCEDURE — 250N000013 HC RX MED GY IP 250 OP 250 PS 637: Performed by: SURGERY

## 2021-04-09 PROCEDURE — 97161 PT EVAL LOW COMPLEX 20 MIN: CPT | Mod: GP | Performed by: PHYSICAL THERAPIST

## 2021-04-09 PROCEDURE — 999N001017 HC STATISTIC GLUCOSE BY METER IP

## 2021-04-09 PROCEDURE — 99223 1ST HOSP IP/OBS HIGH 75: CPT | Mod: 24 | Performed by: NURSE PRACTITIONER

## 2021-04-09 PROCEDURE — 36415 COLL VENOUS BLD VENIPUNCTURE: CPT | Performed by: STUDENT IN AN ORGANIZED HEALTH CARE EDUCATION/TRAINING PROGRAM

## 2021-04-09 PROCEDURE — 80048 BASIC METABOLIC PNL TOTAL CA: CPT | Performed by: STUDENT IN AN ORGANIZED HEALTH CARE EDUCATION/TRAINING PROGRAM

## 2021-04-09 PROCEDURE — 250N000011 HC RX IP 250 OP 636: Performed by: SURGERY

## 2021-04-09 PROCEDURE — 97530 THERAPEUTIC ACTIVITIES: CPT | Mod: GP | Performed by: PHYSICAL THERAPIST

## 2021-04-09 PROCEDURE — 86923 COMPATIBILITY TEST ELECTRIC: CPT | Performed by: SURGERY

## 2021-04-09 PROCEDURE — 250N000013 HC RX MED GY IP 250 OP 250 PS 637: Performed by: STUDENT IN AN ORGANIZED HEALTH CARE EDUCATION/TRAINING PROGRAM

## 2021-04-09 PROCEDURE — 76705 ECHO EXAM OF ABDOMEN: CPT | Mod: 26 | Performed by: STUDENT IN AN ORGANIZED HEALTH CARE EDUCATION/TRAINING PROGRAM

## 2021-04-09 PROCEDURE — P9016 RBC LEUKOCYTES REDUCED: HCPCS | Performed by: SURGERY

## 2021-04-09 PROCEDURE — 120N000002 HC R&B MED SURG/OB UMMC

## 2021-04-09 PROCEDURE — 84155 ASSAY OF PROTEIN SERUM: CPT | Performed by: STUDENT IN AN ORGANIZED HEALTH CARE EDUCATION/TRAINING PROGRAM

## 2021-04-09 RX ORDER — CIPROFLOXACIN 250 MG/1
500 TABLET, FILM COATED ORAL
Status: DISCONTINUED | OUTPATIENT
Start: 2021-04-10 | End: 2021-04-11 | Stop reason: HOSPADM

## 2021-04-09 RX ORDER — ACETAMINOPHEN 325 MG/1
650 TABLET ORAL EVERY 6 HOURS PRN
Status: DISCONTINUED | OUTPATIENT
Start: 2021-04-09 | End: 2021-04-09

## 2021-04-09 RX ORDER — ACETAMINOPHEN 325 MG/1
325 TABLET ORAL EVERY 8 HOURS PRN
Status: DISCONTINUED | OUTPATIENT
Start: 2021-04-09 | End: 2021-04-09

## 2021-04-09 RX ORDER — ACETAMINOPHEN 325 MG/1
650 TABLET ORAL EVERY 8 HOURS PRN
Status: DISCONTINUED | OUTPATIENT
Start: 2021-04-09 | End: 2021-04-11 | Stop reason: HOSPADM

## 2021-04-09 RX ADMIN — ACETAMINOPHEN 650 MG: 325 TABLET, FILM COATED ORAL at 14:39

## 2021-04-09 RX ADMIN — SPIRONOLACTONE 50 MG: 50 TABLET, FILM COATED ORAL at 09:18

## 2021-04-09 RX ADMIN — CIPROFLOXACIN 400 MG: 2 INJECTION, SOLUTION INTRAVENOUS at 05:58

## 2021-04-09 RX ADMIN — FUROSEMIDE 20 MG: 20 TABLET ORAL at 20:57

## 2021-04-09 RX ADMIN — OXYCODONE HYDROCHLORIDE 5 MG: 5 TABLET ORAL at 00:04

## 2021-04-09 RX ADMIN — LACTULOSE 20 G: 20 SOLUTION ORAL at 09:18

## 2021-04-09 RX ADMIN — PROCHLORPERAZINE EDISYLATE 5 MG: 5 INJECTION INTRAMUSCULAR; INTRAVENOUS at 07:52

## 2021-04-09 RX ADMIN — OXYCODONE HYDROCHLORIDE 5 MG: 5 TABLET ORAL at 06:04

## 2021-04-09 RX ADMIN — LACTULOSE 20 G: 20 SOLUTION ORAL at 20:57

## 2021-04-09 RX ADMIN — ACETAMINOPHEN 650 MG: 325 TABLET, FILM COATED ORAL at 06:05

## 2021-04-09 RX ADMIN — FUROSEMIDE 20 MG: 20 TABLET ORAL at 09:18

## 2021-04-09 RX ADMIN — SPIRONOLACTONE 50 MG: 50 TABLET, FILM COATED ORAL at 20:57

## 2021-04-09 RX ADMIN — ONDANSETRON 4 MG: 2 INJECTION INTRAMUSCULAR; INTRAVENOUS at 07:10

## 2021-04-09 ASSESSMENT — ACTIVITIES OF DAILY LIVING (ADL)
ADLS_ACUITY_SCORE: 15
ADLS_ACUITY_SCORE: 14

## 2021-04-09 NOTE — ANESTHESIA POSTPROCEDURE EVALUATION
Patient: Robb Vallejo    Procedure(s):  HERNIORRHAPHY, UMBILICAL, OPEN    Diagnosis:Umbilical hernia [K42.9]  Diagnosis Additional Information: No value filed.    Anesthesia Type:  General    Note:  Disposition: Admission   Postop Pain Control: Uneventful            Sign Out: Well controlled pain   PONV: No   Neuro/Psych: Uneventful            Sign Out: Acceptable/Baseline neuro status   Airway/Respiratory: Uneventful            Sign Out: Acceptable/Baseline resp. status   CV/Hemodynamics: Uneventful            Sign Out: Acceptable CV status   Other NRE: NONE   DID A NON-ROUTINE EVENT OCCUR? No         Last vitals:  Vitals:    04/08/21 2145 04/08/21 2200 04/08/21 2215   BP: 115/54 105/58 110/55   Pulse: 95 91 88   Resp: 10 10 12   Temp:   35.8  C (96.5  F)   SpO2: 100% 100% 100%       Last vitals prior to Anesthesia Care Transfer:  CRNA VITALS  4/8/2021 2052 - 4/8/2021 2152 4/8/2021             NIBP:  143/64    Pulse:  103    NIBP Mean:  78    SpO2:  100 %    Resp Rate (observed):  9          Electronically Signed By: Alin Leiva MD  April 8, 2021  10:55 PM

## 2021-04-09 NOTE — OR NURSING
Dr. Leiva was asked for a sign out which he stated he would do. Have reached the nursing supervisor and have explained the issue of no interepeter and that son is willing to come in and stay with patient to interpret. Waiting an answer.

## 2021-04-09 NOTE — OP NOTE
PROCEDURE DATE:  04/08/2021    PREOPERATIVE DIAGNOSIS:   1-Leaking umbilical hernia  2- CHASE Liver cirrhosis   3-History of TIPS   4- HCC s/p embolization     POSTOPERATIVE DIAGNOSIS:  Same    PROCEDURE:  Open umbilical hernia repair    ATTENDING SURGEON:  Dylon Freed MD    ASSISTANTS:  Nuvia Triplett MD PGY-2  Germán Kamara MD PGY-5     ESTIMATED BLOOD LOSS:  15 ml      COMPLICATIONS:  None immediate     SPECIMEN:   None     OPERATIVE INDICATION:  This is a 81-year-old patient with prior history of cirrhosis secondary to Chase who presents with a leaking umbilical hernia.  The patient has child C cirrhosis.  We discussed with the patient risks and benefits of the operation.  We discussed that he is high risk given his liver cirrhosis and ascites.  The patient and son expressed understanding and agreed to proceed to operating room.    OPERATIVE DETAILS:  The patient was brought to the operating room.  He was placed in the supine position.  Endotracheal anesthesia was induced.  The abdomen was prepped and draped in the usual sterile technique.  A timeout was performed.  Started by making a midline incision.  This was carried down through fascia.  The buccal stalk was amputated.  We identified the fascial defect which was about 1 cm in size.  The intraperitoneal cavity did not have significant amount of ascites.  There was no succus or contamination.  The edges of the defect were dissected down to healthy fascia.  The sac was excised and discarded.  The fascia was then closed with a series of interrupted 3-0 PDS sutures.  A deep layer was closed with running 3-0 Vicryl suture on top of the fascial closure.  A second subcutaneous layer was closed with series of interrupted 3-0 Vicryl sutures.  The skin was closed with running 4-0 Monocryl.  Dermabond was applied.  Counts were correct x2.  The patient tolerated the procedure well.  Dr. Freed was present and scrubbed for all critical portions of the  operation.      Germán Kamara MD  General Surgery, PGY-5

## 2021-04-09 NOTE — PROGRESS NOTES
General Surgery Progress Note  POD#1      Subjective:  Feels well post-operatively. Pain is controlled. Denies fevers, chills, sob. Had an episodes of nausea w/ his oxycodone.     Vitals:  Vitals:    04/08/21 2230 04/08/21 2245 04/08/21 2321 04/09/21 0546   BP: 110/51 110/52 123/53 99/44   BP Location:   Right arm Right arm   Pulse: 93 94 92 88   Resp: 14  16 13   Temp:   97.3  F (36.3  C) 96.6  F (35.9  C)   TempSrc:   Oral Axillary   SpO2: 100% 100% 100% 100%     I/O:  I/O last 3 completed shifts:  In: 700   Out: -     Physical Exam:  Gen: AAOx3, NAD  Pulm: Non-labored breathing  Abd: Soft, non-distended, appropriately tender, umbilical incision c/d/i w/ exofin mesh    Ext:  Warm and well-perfused    BMP  Recent Labs   Lab 04/09/21  0701 04/08/21  1637 04/07/21  2218    136 136   POTASSIUM 3.9 4.3 3.9   CHLORIDE 109 110* 109   CO2 19* 19* 22   BUN 9 8 8   CR 0.75 0.73 0.78   GLC 99 82 119*     CBC  Recent Labs   Lab 04/09/21  0748 04/09/21  0701 04/08/21  1637 04/07/21  2218   WBC  --  2.8* 4.1 4.7   HGB 6.6* 6.6* 8.3* 8.5*   HCT  --  21.6* 25.4* 25.7*   PLT  --  54* 89* 96*         ASSESSMENT: 82 y/o m w/ PMH of cirrhosis secondary to WOODWARD receiving weekly paracentesis s/p TIPS 12/2020, HCC s/p radio embolization, anemia, thrombocytopenia, alzheimer's, umbilical hernia leaking ascites through ulceration now s/p open umbilical hernia repair 04/08. He is progressing well. Hepatology was consulted and recommended diagnostic/therapeutic paracentesis. CAPS notified and will see patient today. Likely discharge tomorrow. He did have some nausea w/ his oxycodone, we will trial alternatives such as tylenol and ibuprofen. He states his pain is controlled without oxycodone this morning.     -paracentesis today, diagnostic/therapeutic  -ok for home ppx cipro, will treat SBP if there is evidence on paracentesis  -ADAT  -home medications ordered will start today  -will discontinue oxycodone, can take acetaminophen for  pain <1g/day     Grzegorz Block MD  PGY-1    Patient was seen and discussed with Dr. Barlow

## 2021-04-09 NOTE — PLAN OF CARE
5A OT - Hold    Evaluation orders received and appreciated. PT initiated care first. Per PT, pt lives with his adult son and family. Pt's family provides assist w/ ADLs and plans to continue providing assist. Pt and son declining IP OT as pt's son plans to continue providing assist as needed. OT will hold at this time, until PT has had opportunity to confirm that pt does not have IP OT needs. Anticipate OT will ultimately defer to PT for strengthening and mobility within precautions.

## 2021-04-09 NOTE — PLAN OF CARE
Time: 6155-2861     Reason for admission: Abdominal ascites [R18.8]\  Patient had Umbilical hernia repair today.     Patient is in need of a [employer]      Vitals: /53 (BP Location: Right arm)   Pulse 92   Temp 97.3  F (36.3  C) (Oral)   Resp 16   SpO2 100%    Activity: assist of one.    Pain: Denied pain.   Neuro: axo x4 History of alzheimers.   Cardiac: Slight aortic heart murmer.   Respiratory: WDL   GI/: WDL  Diet:  Clear liquid.   Lines:   Peripheral IV 04/08/21 Left;Posterior Lower forearm (Active)   Site Assessment WDL 04/08/21 2300   Line Status Infusing 04/08/21 2300   Phlebitis Scale 0-->no symptoms 04/08/21 2300   Number of days: 0     Skin/Wounds: WDL ex Surgical incision wound abdomen.     Plan: Continue to monitor post op. Pain management.      Continue to monitor and follow POC

## 2021-04-09 NOTE — ANESTHESIA PREPROCEDURE EVALUATION
Anesthesia Pre-Procedure Evaluation    Patient: Robb Vallejo   MRN: [id number] : 1940        Preoperative Diagnosis: Umbilical hernia [K42.9]   Procedure : Procedure(s):  HERNIORRHAPHY, UMBILICAL, OPEN     Past Medical History:   Diagnosis Date     Cirrhosis (H)      Dementia      Hypertension       No past surgical history on file.   Allergies   Allergen Reactions     Penicillins Other (See Comments)     Syncope       Propranolol      Hallucinations, Passed out.       Social History     Tobacco Use     Smoking status: Unknown If Ever Smoked     Tobacco comment: Current non-smoker.   Substance Use Topics     Alcohol use: No     Alcohol/week: 0.0 standard drinks      Wt Readings from Last 1 Encounters:   21 56.8 kg (125 lb 3.2 oz)        Anesthesia Evaluation   Pt has had prior anesthetic. Type: General.    No history of anesthetic complications       ROS/MED HX  ENT/Pulmonary:  - neg pulmonary ROS     Neurologic: Comment: S/p TIPS    (+) dementia,     Cardiovascular:     (+) hypertension-----    METS/Exercise Tolerance:     Hematologic: Comments: Thrombocytopenia    (+) anemia,     Musculoskeletal:       GI/Hepatic:     (+) hepatitis type Other, liver disease,     Renal/Genitourinary:       Endo:  - neg endo ROS     Psychiatric/Substance Use:       Infectious Disease:  - neg infectious disease ROS     Malignancy:  - neg malignancy ROS     Other:            Physical Exam    Airway        Mallampati: III   TM distance: > 3 FB   Neck ROM: full   Mouth opening: > 3 cm    Respiratory Devices and Support         Dental       (+) upper dentures and lower dentures      Cardiovascular   cardiovascular exam normal          Pulmonary   pulmonary exam normal                OUTSIDE LABS:  CBC:   Lab Results   Component Value Date    WBC 4.1 2021    WBC 4.7 2021    HGB 8.3 (L) 2021    HGB 8.5 (L) 2021    HCT 25.4 (L) 2021    HCT 25.7 (L) 2021    PLT 89 (L) 2021    PLT  96 (L) 04/07/2021     BMP:   Lab Results   Component Value Date     04/08/2021     04/07/2021    POTASSIUM 4.3 04/08/2021    POTASSIUM 3.9 04/07/2021    CHLORIDE 110 (H) 04/08/2021    CHLORIDE 109 04/07/2021    CO2 19 (L) 04/08/2021    CO2 22 04/07/2021    BUN 8 04/08/2021    BUN 8 04/07/2021    CR 0.73 04/08/2021    CR 0.78 04/07/2021    GLC 82 04/08/2021     (H) 04/07/2021     COAGS:   Lab Results   Component Value Date    PTT 33 04/04/2016    INR 2.07 (H) 04/07/2021     POC:   Lab Results   Component Value Date    BGM 65 (L) 04/08/2021     HEPATIC:   Lab Results   Component Value Date    ALBUMIN 1.4 (L) 04/08/2021    PROTTOTAL 5.7 (L) 04/08/2021    ALT 23 04/08/2021    AST 34 04/08/2021    ALKPHOS 120 04/08/2021    BILITOTAL 2.1 (H) 04/08/2021     OTHER:   Lab Results   Component Value Date    LACT 1.7 04/07/2021    JOHN 7.6 (L) 04/08/2021    PHOS 2.2 (L) 01/19/2021    MAG 1.8 01/19/2021    LIPASE 233 09/18/2015       Anesthesia Plan    ASA Status:  3   NPO Status:  NPO Appropriate    Anesthesia Type: General.     - Airway: LMA   Induction: Intravenous.   Maintenance: Balanced.        Consents    Anesthesia Plan(s) and associated risks, benefits, and realistic alternatives discussed. Questions answered and patient/representative(s) expressed understanding.     - Discussed with:  Patient,       - Extended Intubation/Ventilatory Support Discussed: No.      - Patient is DNR/DNI Status: No    Use of blood products discussed: No .     Postoperative Care    Pain management: IV analgesics.   PONV prophylaxis: Ondansetron (or other 5HT-3)     Comments:         H&P reviewed: Unable to attach H&P to encounter due to EHR limitations. H&P Update: appropriate H&P reviewed, patient examined. No interval changes since H&P (within 30 days).         Alin Leiva MD

## 2021-04-09 NOTE — ANESTHESIA CARE TRANSFER NOTE
Patient: Robb Vallejo    Procedure(s):  HERNIORRHAPHY, UMBILICAL, OPEN    Diagnosis: Umbilical hernia [K42.9]  Diagnosis Additional Information: No value filed.    Anesthesia Type:   General     Note:    Oropharynx: spontaneously breathing  Level of Consciousness: awake  Oxygen Supplementation: face mask    Independent Airway: airway patency satisfactory and stable  Dentition: dentition unchanged  Vital Signs Stable: post-procedure vital signs reviewed and stable  Report to RN Given: handoff report given  Patient transferred to: PACU    Handoff Report: Identifed the Patient, Identified the Reponsible Provider, Reviewed the pertinent medical history, Discussed the surgical course, Reviewed Intra-OP anesthesia mangement and issues during anesthesia, Set expectations for post-procedure period and Allowed opportunity for questions and acknowledgement of understanding      Vitals: (Last set prior to Anesthesia Care Transfer)  CRNA VITALS  4/8/2021 2052 - 4/8/2021 2127 4/8/2021             NIBP:  143/64    Pulse:  103    NIBP Mean:  78    SpO2:  100 %    Resp Rate (observed):  9        Electronically Signed By: Kade Fournier MD  April 8, 2021  9:27 PM

## 2021-04-09 NOTE — CONSULTS
Hepatology Consultation    Robb Vallejo   MRN# [id number]     Age: 81 year old YOB: 1940       Referring provider: Viji Barlow  Attending Hepatologist: Dr. Leventhal   Consult requested for: cirrhosis post surgery       Assessment and Recommendation:   Assessment:   Mr. 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       Recommendations:   1. S/p umbilical hernia repair  2. Leaking ascites  3. S/p TIPS  - surgery completed 4/8. No drain placed. To help with healing, recommend more frequent braxton (2x weekly).   - please have CAPS team perform diagnostic/therapeutic para if present- orders placed for cell count/diff/cultures/cytology.   - TIPS placed on 12/16/20 with initial gradiant decrease to 4 mm Hg. US 3/17 with patent TIPS. Echo does have some sign of diastolic dysfunction which may be contributing to his continued ascites.   - since plan for possible discharge today, I will reach out to primary RN taking at [facility] for follow up as he normally follows with [facility].  - continue with SBP ppx as previous. May resume prior diuretics.     4. WOODWARD cirrhosis  5. HCC  6. HE  - MELD 19  - most recent MRI with nonviable treated lesions in right lobe.   - continue lactulose BID for HE. Discussed with son that he may also use mirilax if lactulose causing gaseous distention with constipation.     Plan of care discussed with Dr. Leventhal    Thank you for the opportunity to be involved in Robb Vallejo care. Please call with any questions or concerns.     Sandra Lira, APRN, CNP  Inpatient Hepatology [facility]  Text link               History of Present Illness:   Robb Vallejo is a 81 year old male with a history of WOODWARD cirrhosis. His liver disease has been complicated  by EV s/p banding (last EGD 8/2018 with small EV), ascites s/p TIPS (12/16/20) with continued ascites following, right lobe HCC s/p Y90 treatement (2018, 2019, 5/11/20), sarcopenia and weakness who was admitted with umbilical leaking ascites requiring repair by surgery on 4/8/21. His last imaging for his HCC was on 3/5/21 with LR-TR nonviable lesion.                Past Medical History:     Past Medical History:   Diagnosis Date     Cirrhosis (H)      Dementia      Hypertension               Past Surgical History:     Cataracts R/L 2015  IR radioembolization 9/5/18, 3/11/19, 5/11/20  Open umbilical hernia repair 4/8/21             Social History:     Social History     Tobacco Use     Smoking status: Unknown If Ever Smoked     Tobacco comment: Current non-smoker.   Substance Use Topics     Alcohol use: No     Alcohol/week: 0.0 standard drinks             Family History:   No family history of liver disease or liver cancer.             Immunizations:     Immunization History   Administered Date(s) Administered     COVID-19,PF,Pfizer 02/11/2021, 03/04/2021            Allergies:     Allergies   Allergen Reactions     Penicillins Other (See Comments)     Syncope       Propranolol      Hallucinations, Passed out.              Medications:   @  Medications Prior to Admission   Medication Sig Dispense Refill Last Dose     ciprofloxacin (CIPRO) 500 MG tablet Take 1 tablet (500 mg) by mouth daily For SBP prophylaxis 30 tablet 0      docusate sodium (COLACE) 100 MG capsule Take 100 mg by mouth daily as needed for constipation        furosemide (LASIX) 20 MG tablet Take 20 mg by mouth 2 times daily         lactulose (CONSTULOSE) 10 GM/15ML solution Take 20 g by mouth 2 times daily May decrease to once per day if 2 stools per day        spironolactone (ALDACTONE) 50 MG tablet Take 50 mg by mouth 2 times daily      @          Review of Systems:    ROS: 10 point ROS neg other than the symptoms noted above in the HPI.           Physical Exam:   Blood pressure 99/44, pulse 88, temperature 96.6  F (35.9  C), temperature source Axillary, resp. rate 13, SpO2 100 %. There is no height or weight on file to calculate BMI.  Date 04/09/21 0700 - 04/10/21 0659   Shift 0646-0261 8151-4494 6220-0768 24 Hour Total   INTAKE   Shift Total       OUTPUT   Urine 725   725   Shift Total 725   725   Weight (kg)         General: In no acute distress, moderate facial muscle wasting  Neuro: AOx3, No asterixis  HEENT: PERRL Noscleral icterus, Nooral lesions  Lymph:  Nocervical lymphadenoapthy  CV:  Skin warm and dry  Lungs:  Respirations even and nonlabored on room air  Abd: Nondistended, mildly tender hypoactive BS  Extrem: +1 lower peripehral edema  Skin: Nojaundice  Psych: flat         Data:     Lab Results   Component Value Date    WBC 2.8 04/09/2021     Lab Results   Component Value Date    RBC 2.27 04/09/2021     Lab Results   Component Value Date    HGB 6.6 04/09/2021     Lab Results   Component Value Date    HCT 21.6 04/09/2021     No components found for: MCT  Lab Results   Component Value Date    MCV 95 04/09/2021     Lab Results   Component Value Date    MCH 29.1 04/09/2021     Lab Results   Component Value Date    MCHC 30.6 04/09/2021     Lab Results   Component Value Date    RDW 16.7 04/09/2021     Lab Results   Component Value Date    PLT 54 04/09/2021       Last Basic Metabolic Panel:  Lab Results   Component Value Date     04/09/2021      Lab Results   Component Value Date    POTASSIUM 3.9 04/09/2021     Lab Results   Component Value Date    CHLORIDE 109 04/09/2021     Lab Results   Component Value Date    JOHN 7.5 04/09/2021     Lab Results   Component Value Date    CO2 19 04/09/2021     Lab Results   Component Value Date    BUN 9 04/09/2021     Lab Results   Component Value Date    CR 0.75 04/09/2021     Lab Results   Component Value Date    GLC 99 04/09/2021       Liver Function Studies -   Recent Labs   Lab Test 04/08/21  1637   PROTTOTAL  5.7*   ALBUMIN 1.4*   BILITOTAL 2.1*   ALKPHOS 120   AST 34   ALT 23       Lab Results   Component Value Date    INR 2.07 04/07/2021       MELD-Na score: 19 at 4/9/2021  7:01 AM  MELD score: 18 at 4/9/2021  7:01 AM  Calculated from:  Serum Creatinine: 0.75 mg/dL (Rounded to 1 mg/dL) at 4/9/2021  7:01 AM  Serum Sodium: 136 mmol/L at 4/9/2021  7:01 AM  Total Bilirubin: 2.1 mg/dL at 4/8/2021  4:37 PM  INR(ratio): 2.07 at 4/7/2021 10:18 PM  Age: 81 years           Previous Endoscopy:     EGD 8/2018  Small EV    IMAGING:    US abd w duplex 3/17/21    Widely patent TIPS with excellent main portal vein velocities   and no suspicious velocity shifts within the TIPS itself.  Despite that,   there is a moderate to large amount of ascites.     Echo 3/16/21  Final Impressions:   1. Normal LV size, mildly increased wall thickness, normal global systolic function with an estimated EF of 60 - 65%.   2. Spectral Doppler shows Grade 1 pattern of LV diastolic filling.   3. Mildly enlarged left atrium.   4. The aortic valve is trileaflet and sclerotic, moderate stenosis(mean gradient 25 mm Hg) and trivial regurgitation.    MRI w/wo 3/5/21  Impression:  1. Cirrhosis and radiation change related to previous liver directed therapies for HCC within the right hepatic lobe, without evidence of radiographic viable disease (LR-TR nonviable)  2. Patent TIPS. Moderate-to-large amount of ascites however. Severe splenomegaly.

## 2021-04-09 NOTE — CONSULTS
Consult and Procedure Service - Procedure Note    Attending: Dr. Mounika Saleh  Resident: Dr. Zahida Pappas  Indication: Ascites     On bedside ultrasound, minimal ascitic fluid was present in all 5 areas assessed. The largest ascitic pocket was suprapubic, which could not be aspirated safely at bedside given recent open umbilical hernia repair (4/8/21) & small volume of the ascitic fluid.     No safe pocket to target today for paracentesis. If diagnostic paracentesis is still desired, would defer to IR at this time.     Please place new consult in coming days if repeat attempt at paracentesis is needed from procedure team.     Zahida Pappas MD/MPH  Internal Medicine, PGY2  p [phone]    DOS:  4/9/2021

## 2021-04-09 NOTE — OR NURSING
Have attempted to get an interepter in [facility], but there is no one available. This nurse is currently using Patients son on speaker phone to talk to patient. Son is willing to come to the hospital to be with patient if it can be allowed. Calling floor now.

## 2021-04-09 NOTE — PLAN OF CARE
Shift time: 7645-1153.    Neuro: pt is oriented to self, disoriented to place and time. Pt speaks [place]  on ipad utilized. Bed alarm in place for safety.   VS: soft BP otherwise VSS on RA  Pain: c/o abdominal pain x1, given oxycodone, pt then fell asleep. This am pt was in pain and gave oxycodone + tylenol.  Diet: clear liquid- given some broth w/o complications x2  Act: did not get OOB.   Lines: PIV SL/infusing intermittent abx  Skin: abdominal incision CDI, liquid bandage. Bruising on forearm.   Cardiovascular: murmer detected.  Resp:  LS clear. RA.   GI/: voiding. Pt says that he is not passing gas at this time. Will monitor. Pt had an episode of emesis this am around 0700. Gave IV zofran..     New this shift: cont to monitor pain.

## 2021-04-09 NOTE — PLAN OF CARE
Time 3529-7678      Reason for admission:   Abdominal ascites [R18.8]     Vitals: /44 (BP Location: Right arm)   Pulse 77   Temp 98.1  F (36.7  C) (Oral)   Resp 12   SpO2 100%      Activity: Up A1 with walker  Pain: reports abdominal pain, controlled with PRN tylenol   Neuro: A&Ox4 with   Cardiac: WDL  Respiratory: WDL on RA   GI/: nausea and poor appetite in AM, plan to order lunch and   Diet: Regular   Lines: L PIV infusing.   Skin/Wounds: WDL  Labs/imaging: Hgb 6.6 1unit PRBC infusing       This shift: 1 unit PRBC infusing. Advanced to regular diet.     Plan:     Plan for possible discharge tomorrow if tolerating diet well.

## 2021-04-10 LAB
HGB BLD-MCNC: 7.2 G/DL (ref 13.3–17.7)
LACTATE BLD-SCNC: 2 MMOL/L (ref 0.7–2)

## 2021-04-10 PROCEDURE — 999N000128 HC STATISTIC PERIPHERAL IV START W/O US GUIDANCE

## 2021-04-10 PROCEDURE — 85018 HEMOGLOBIN: CPT | Performed by: STUDENT IN AN ORGANIZED HEALTH CARE EDUCATION/TRAINING PROGRAM

## 2021-04-10 PROCEDURE — 36415 COLL VENOUS BLD VENIPUNCTURE: CPT | Performed by: STUDENT IN AN ORGANIZED HEALTH CARE EDUCATION/TRAINING PROGRAM

## 2021-04-10 PROCEDURE — 120N000002 HC R&B MED SURG/OB UMMC

## 2021-04-10 PROCEDURE — 250N000013 HC RX MED GY IP 250 OP 250 PS 637: Performed by: STUDENT IN AN ORGANIZED HEALTH CARE EDUCATION/TRAINING PROGRAM

## 2021-04-10 PROCEDURE — 250N000013 HC RX MED GY IP 250 OP 250 PS 637: Performed by: SURGERY

## 2021-04-10 PROCEDURE — 99232 SBSQ HOSP IP/OBS MODERATE 35: CPT | Performed by: NURSE PRACTITIONER

## 2021-04-10 PROCEDURE — 250N000013 HC RX MED GY IP 250 OP 250 PS 637: Performed by: NURSE PRACTITIONER

## 2021-04-10 PROCEDURE — 99207 PR CONSULT E&M CHANGED TO SUBSEQUENT LEVEL: CPT | Performed by: NURSE PRACTITIONER

## 2021-04-10 PROCEDURE — 83605 ASSAY OF LACTIC ACID: CPT | Performed by: STUDENT IN AN ORGANIZED HEALTH CARE EDUCATION/TRAINING PROGRAM

## 2021-04-10 RX ORDER — LACTULOSE 10 G/15ML
20 SOLUTION ORAL ONCE
Status: COMPLETED | OUTPATIENT
Start: 2021-04-10 | End: 2021-04-10

## 2021-04-10 RX ORDER — BISACODYL 10 MG
10 SUPPOSITORY, RECTAL RECTAL DAILY PRN
Status: DISCONTINUED | OUTPATIENT
Start: 2021-04-10 | End: 2021-04-11 | Stop reason: HOSPADM

## 2021-04-10 RX ORDER — POLYETHYLENE GLYCOL 3350 17 G/17G
17 POWDER, FOR SOLUTION ORAL DAILY
Status: DISCONTINUED | OUTPATIENT
Start: 2021-04-10 | End: 2021-04-11 | Stop reason: HOSPADM

## 2021-04-10 RX ADMIN — POLYETHYLENE GLYCOL 3350 17 G: 17 POWDER, FOR SOLUTION ORAL at 14:50

## 2021-04-10 RX ADMIN — FUROSEMIDE 20 MG: 20 TABLET ORAL at 19:18

## 2021-04-10 RX ADMIN — SPIRONOLACTONE 50 MG: 50 TABLET, FILM COATED ORAL at 19:18

## 2021-04-10 RX ADMIN — SPIRONOLACTONE 50 MG: 50 TABLET, FILM COATED ORAL at 08:55

## 2021-04-10 RX ADMIN — LACTULOSE 20 G: 20 SOLUTION ORAL at 19:18

## 2021-04-10 RX ADMIN — FUROSEMIDE 20 MG: 20 TABLET ORAL at 08:55

## 2021-04-10 RX ADMIN — LACTULOSE 20 G: 20 SOLUTION ORAL at 08:56

## 2021-04-10 RX ADMIN — LACTULOSE 20 G: 20 SOLUTION ORAL at 14:49

## 2021-04-10 RX ADMIN — ACETAMINOPHEN 650 MG: 325 TABLET, FILM COATED ORAL at 21:59

## 2021-04-10 RX ADMIN — CIPROFLOXACIN HYDROCHLORIDE 500 MG: 500 TABLET, FILM COATED ORAL at 08:55

## 2021-04-10 ASSESSMENT — ACTIVITIES OF DAILY LIVING (ADL)
ADLS_ACUITY_SCORE: 14
ADLS_ACUITY_SCORE: 15

## 2021-04-10 NOTE — PLAN OF CARE
Time 5033-4825      Reason for admission:   Abdominal ascites [R18.8]      Vitals: /42 (BP Location: Left arm)   Pulse 87   Temp 98.4  F (36.9  C) (Oral)   Resp 18   SpO2 100%         Activity: Up SBA  Pain: Denies pain this shift   Neuro: A&Ox4 with    Cardiac: WDL   Respiratory: WDL on RA   GI/: poor appetite, due for BM. One time lactulose and miralax given   Diet: Regular, Son bringing foods from home  Lines: L PIV SL flushes well  Skin/Wounds: WDL. Some swelling in L forearm  Labs/imaging: Hgb 7.2       Today:     Med team consulted. Plan to follow POC and discharge when able

## 2021-04-10 NOTE — PROGRESS NOTES
04/09/21 0941   Quick Adds   Type of Visit Initial PT Evaluation       Present yes   Language Thompson - son present & provided interpretation   Living Environment   People in home child(david), adult;grandchild(david);spouse   Current Living Arrangements house   Home Accessibility stairs to enter home;stairs within home   Number of Stairs, Main Entrance 2   Stair Railings, Main Entrance none   Number of Stairs, Within Home, Primary 9   Stair Railings, Within Home, Primary railing on right side (ascending)   Transportation Anticipated family or friend will provide   Living Environment Comments Pt lives with his son & son's family; pt's spouse also lives with them; they live in the lower level of the home (4 steps, then landing, then an additional 5 steps)   Self-Care   Usual Activity Tolerance good   Current Activity Tolerance fair   Regular Exercise Yes   Activity/Exercise Type walking   Exercise Amount/Frequency daily   Equipment Currently Used at Home cane, straight;grab bar, toilet;grab bar, tub/shower;hospital bed;shower chair   Activity/Exercise/Self-Care Comment Per pt's son, family provides assistance with dressing, bathing & other personal cares   Disability/Function   Hearing Difficulty or Deaf yes   Describe hearing loss bilateral hearing loss   Use of hearing assistive devices none  (pt tried hearing aids but did not like them)   Hearing Management Per pt's son, speaking slowly & with increased volume is effective   Wear Glasses or Blind yes   Vision Management reading glasses   Concentrating, Remembering or Making Decisions Difficulty yes   Concentration Management pt's son reports memory issues   Difficulty Communicating no   Difficulty Eating/Swallowing no   Walking or Climbing Stairs Difficulty yes   Walking or Climbing Stairs ambulation difficulty, requires equipment;stair climbing difficulty, dependent   Mobility Management family assists as needed   Dressing/Bathing Difficulty  yes   Dressing/Bathing bathing difficulty, requires equipment;bathing difficulty, assistance 1 person;dressing difficulty, requires equipment;dressing difficulty, assistance 1 person   Dressing/Bathing Management family provides assistance withi dressing & bathing   Toileting issues no   Doing Errands Independently Difficulty (such as shopping) no   Fall history within last six months no   Change in Functional Status Since Onset of Current Illness/Injury yes   General Information   Onset of Illness/Injury or Date of Surgery 04/08/21   Referring Physician Caridad Gonsales MD   Patient/Family Therapy Goals Statement (PT) Return to son's home   Pertinent History of Current Problem (include personal factors and/or comorbidities that impact the POC) Pt presented to [facility] ED with ulcerated umbilical hernia that was leaking fluid consistent with ascites; transferred to [facility] for urgent surgical repair. PMHx: cirrhosis d/t WOODWARD & receiving weekly paracentesis; s/p TIPS 12/2020; HCC s/p radio embolization; anemia; thrombocytopenia; Alzheimers   Existing Precautions/Restrictions abdominal   General Observations Pt supine upon PTs arrival; agreeable to PT session with son present   Pain Assessment   Patient Currently in Pain Yes, see Vital Sign flowsheet   Posture    Posture Forward head position;Protracted shoulders;Kyphosis   Range of Motion (ROM)   ROM Quick Adds ROM WFL   Strength   Strength Comments MMT deferred; exhibits generalized L/E weakness   Bed Mobility   Comment (Bed Mobility) Supine to right sidelying with use of bed rail & SBA; verbal instruction & min assist for right sidelying to sitting   Transfers   Transfer Safety Comments Sit to stand from EOB with SBA & U/E support from bed rail   Gait/Stairs (Locomotion)   Comment (Gait/Stairs) CGA to amb with U/E support on IV pole 20 ft on level; exhibits forward flexed posture, decreased step length bilaterally, decreased gait vani   Balance   Balance Comments  sitting-good at EOB without U/E support; standing with at least single U/E support & CGA   Clinical Impression   Criteria for Skilled Therapeutic Intervention yes, treatment indicated   PT Diagnosis (PT) impaired mobility   Influenced by the following impairments pain, post-op abd precautions, generalized weakness, deconditioning   Functional limitations due to impairments decreased independence with bed mobility, transfers & gait   Clinical Presentation Stable/Uncomplicated   Clinical Presentation Rationale based upon subjective information provided, objective exam findings, medical history & clinical judgement   Clinical Decision Making (Complexity) low complexity   Therapy Frequency (PT) 5x/week   Predicted Duration of Therapy Intervention (days/wks) 1 week   Planned Therapy Interventions (PT) bed mobility training;gait training;stair training;strengthening;transfer training   Risk & Benefits of therapy have been explained evaluation/treatment results reviewed;care plan/treatment goals reviewed;participants voiced agreement with care plan;participants included;patient;son   PT Discharge Planning    PT Discharge Recommendation (DC Rec) home with assist;home with home care physical therapy   PT Rationale for DC Rec Requiring assist for mobility & need for education regarding abd precautions; will benefit from continued skilled PT intervention to address mobility deficits, improve strength & activity tolerance; anticipate that pt will be able to return to son's home upon hospital discharge with assist from family & home PT intervention    PT Brief overview of current status  min assist for bed mobility; SBA for transfers; CGA to amb   Total Evaluation Time   Total Evaluation Time (Minutes) 15

## 2021-04-10 NOTE — CONSULTS
[facility]  Consult Note - Hospitalist Service, Gold      Date of Admission:  4/8/2021  Consult Requested by: General Surgery, Dr. Freed  Reason for Consult: Medical co-management of WOODWARD cirrhosis s/p TIPs and HCC following umbilical hernia repair in setting of leaking umbilical hernia     Assessment & Plan   Robb Vallejo is a 81 year old male admitted on 4/8/2021. 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  Taken to the OR on 4/8 urgently and is now s/p an open umbilical hernia repair (no mesh).  Internal medicine has been consulted for medical co-management of his underlying WOODWARD cirrhosis and anemia.     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  Pain well controlled presently.   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  Cirrhosis with significant ascites currently requiring weekly paracentesis with removal of 2-4L per time.  Last paracentesis 4/8.  Underwent TIPS (12/28/2020).  Currently on Lasix 20 mg BID, Spironolactone 50 mg BID, Lactulose 20 gm BID and Cipr 500 mg daily.  No current s/s of HE or SBP. MELD-Na 19 today.   - Continue PTA Lactulose, monitor for s/s of HE  - Continue PTA Lasix and Spironolactone   - Per hepatology recommendation, increase his OP paracentesis to 2x per week for surgical wound protection  (hepatology to arrange)  - CAPS consult to evaluate if he would benefit from paracenteses on 4/11 prior to discharge (ordered and d/w provider)  - Follow up with OP hepatologist this week    #Slow transit constipation  Last BM per family 4/5 despite Lactulose BID.  Possible contribution from narcotics following surgery.   - Additional 20 gm Lactulose this afternoon (ordered for you)  - Add Miralax daily with PRN dulcolax suppository (ordered for you)    #Pancytopenia  Likely due to cirrhosis.  Appears as though his most recent baseline Hgb is ~ 7.  No current s/s of active bleeding, no significant intra-operative blood loss.  Likely due to known cirrhosis and recent stress of surgery.  Received 1 unit pRBC on 4/9 for Hgb 6.6, currently 7.2.   - CBC in AM, if stable ok to follow up as OP   - Monitor for s/s of bleeding     #Hx HCC s/p radio embolization  Follows at [facility].  Follow up MRIs showed (last 3/5/2021) the lesions to be nonviable and smaller.    - Follow up as OP     #Dementia  Son is primary care giver.  IP PT has recommended home PT but appears as though family has declined.        The patient's care was discussed with the Attending Physician, Dr. Lawson.    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  Taken to the OR on 4/8 urgently and is now s/p an open umbilical hernia repair (no mesh).  Internal medicine has been consulted for medical co-management of his underlying WOODWARD cirrhosis and anemia.     Son is at bedside as the patient speaks only Tigrinya and he translates for him.  Currently, he is doing well.  He is having some mild abdominal discomfort.  Son notes that he has not had a BM since last Monday.  Son also feels that he has built up some ascites.  Otherwise, no difficulty breathing, no chest pain or dysuria.       Review of Systems   The 10 point Review of Systems is negative other than noted in the HPI or here.     Past Medical History    I have reviewed this patient's medical history and updated it with pertinent information if needed.   Past Medical History:   Diagnosis Date     Cirrhosis (H)      Dementia (H)      Hypertension        Past Surgical History   I have reviewed this patient's surgical history and updated it with pertinent information if needed.  Past Surgical History:   Procedure Laterality Date     HERNIORRHAPHY UMBILICAL N/A 4/8/2021    Procedure: HERNIORRHAPHY, UMBILICAL, OPEN;  Surgeon: Dylon Freed MD;  Location: [facility] OR       Social History   I have reviewed this patient's social history and updated it with pertinent information if needed.  Social History     Tobacco Use     Smoking status: Unknown If Ever Smoked     Tobacco comment: Current non-smoker.   Substance Use Topics     Alcohol use: No     Alcohol/week: 0.0 standard drinks     Drug use: No       Family History   No significant family history    Medications   Current Facility-Administered Medications   Medication     acetaminophen (TYLENOL) tablet 650 mg     benzocaine-menthol (CEPACOL) 15-3.6 MG lozenge 1 lozenge     bisacodyl (DULCOLAX) Suppository 10 mg     ciprofloxacin (CIPRO) tablet 500 mg     docusate sodium (COLACE)  capsule 100 mg     furosemide (LASIX) tablet 20 mg     lactulose (CHRONULAC) solution 20 g     lidocaine (LMX4) cream     lidocaine (LMX4) cream     lidocaine 1 % 0.1-1 mL     lidocaine 1 % 0.1-1 mL     naloxone (NARCAN) injection 0.2 mg     naloxone (NARCAN) injection 0.2 mg     naloxone (NARCAN) injection 0.4 mg     naloxone (NARCAN) injection 0.4 mg     ondansetron (ZOFRAN-ODT) ODT tab 4 mg    Or     ondansetron (ZOFRAN) injection 4 mg     polyethylene glycol (MIRALAX) Packet 17 g     prochlorperazine (COMPAZINE) injection 5 mg    Or     prochlorperazine (COMPAZINE) tablet 5 mg     sodium chloride (PF) 0.9% PF flush 3 mL     sodium chloride (PF) 0.9% PF flush 3 mL     sodium chloride (PF) 0.9% PF flush 3 mL     sodium chloride (PF) 0.9% PF flush 3 mL     sodium chloride (PF) 0.9% PF flush 3 mL     sodium chloride (PF) 0.9% PF flush 3 mL     spironolactone (ALDACTONE) tablet 50 mg       Allergies   Allergies   Allergen Reactions     Penicillins Other (See Comments)     Syncope       Propranolol      Hallucinations, Passed out.        Physical Exam   Vital Signs: Temp: 99  F (37.2  C) Temp src: Oral BP: 102/46 Pulse: 84   Resp: 18 SpO2: 100 % O2 Device: None (Room air)    Weight: 0 lbs 0 oz    General Appearance: NAD, resting comfortably in bed.   Eyes: PERRLA  HEENT: No lymphadenopathy, MMM.   Respiratory: Lungs CTAB, no wheezes or rales.   Cardiovascular: RRR, S1S2.  Grade IV/VI holosystolic murmur   GI: Abdomen soft, NTND.  Umbilical hernia incision CDI.   Skin: No peripheral edema.  Distal pulses intact.   Musculoskeletal: No joint swelling or tenderness.   Neurologic: A+O x 3. No asterixis   Psychiatric: Mood stable.     Data   Results for orders placed or performed during the hospital encounter of 04/08/21 (from the past 24 hour(s))   Lactic acid level STAT   Result Value Ref Range    Lactate for Sepsis Protocol 2.0 0.7 - 2.0 mmol/L   Hemoglobin   Result Value Ref Range    Hemoglobin 7.2 (L) 13.3 - 17.7 g/dL

## 2021-04-10 NOTE — PLAN OF CARE
Time: 1900-0730    Reason for admission: Hernia repair d/t leaking ascites    A&Ox3, mildly disoriented to time. Up w/ SBA, VSS on RA ex soft BPs. Denies pain/nausea/SOB. Abd tenderness at midline incision, dressing liquid bandage + tape CDI. Voiding in bedside urinal, concentrated/dark demetrius/malodorous urine noted, no BM this shift. Regular diet. PIV SL. Slept well overnight     Shift Highlights: Triggered sepsis, LA 2.0 -- MD notified. Hgb recheck 7.2    Plan: Possible discharge today?

## 2021-04-10 NOTE — PROGRESS NOTES
Hepatology Progress Note    Date of Service: April 10, 2021     ASSESSMENT/PLAN:   81y M with PMHx of WOODWARD cirrhosis complicated by ascites s/p TIPS 12/2020, and recurrent HCC who presents with Flood Syndrome  - S/p Umbilical repair 4/8    RECS:  -  Can have upwards of 2g of acetaminophen daily for pain control. Avoid all NSAIDs given risk of kidney injury  - Will need paracentesis 2X/week to protect surgical site  - Have contacted outpatient hepatology team at [facility] to ensure cares  - continue on SBP prophylaxis    Thomas M. Leventhal, M.D.   of Medicine  Advanced/Transplant Hepatology & Critical Care Medicine  [facility]

## 2021-04-10 NOTE — PLAN OF CARE
Time: 1500 - 1930    VSS on RA, afebrile. Pt A&Ox4, Thompson speaking. Son at bedside this evening. Regular diet, tolerating well. Voiding WDL. No BM this shift. Denies nausea. L PIV WDL, saline locked. Pt received a unit of blood this afternoon, tolerated well with no signs of reaction. Recheck Hb in AM. Continue to monitor and follow POC.

## 2021-04-10 NOTE — CONSULTS
Care Management Initial Consult    General Information  Assessment completed with: Children(Son Karl),    Type of CM/SW Visit: Offer D/C Planning    Primary Care Provider verified and updated as needed:     Readmission within the last 30 days:        Reason for Consult: discharge planning  Advance Care Planning: not on file, not discussed.       Communication Assessment  Patient's communication style: spoke to the pt's son Karl, spoken language (English or Bilingual)(Pt speaks yudy)    Hearing Difficulty or Deaf: no   Wear Glasses or Blind: yes    Cognitive  Cognitive/Neuro/Behavioral: .WDL except  Level of Consciousness: alert  Arousal Level: opens eyes spontaneously  Orientation: disoriented to, time  Mood/Behavior: calm, cooperative  Best Language: 0 - No aphasia  Speech: clear, logical    Living Environment:   People in home: child(david), adult     Current living Arrangements: house      Able to return to prior arrangements: yes       Family/Social Support:  Care provided by:  Self, family  Provides care for: no one  Marital Status:   Wife, Children          Description of Support System: Supportive    Support Assessment: Adequate family and caregiver support    Current Resources:   Patient receiving home care services:       Community Resources:    Equipment currently used at home: cane, straight, grab bar, toilet, grab bar, tub/shower, hospital bed, shower chair  Supplies currently used at home:      Employment/Financial:  Employment Status: retired        Financial Concerns:             Lifestyle & Psychosocial Needs:        Socioeconomic History     Marital status:      Spouse name: Not on file     Number of children: Not on file     Years of education: Not on file     Highest education level: Not on file     Tobacco Use     Smoking status: Unknown If Ever Smoked     Tobacco comment: Current non-smoker.   Substance and Sexual Activity     Alcohol use: No     Alcohol/week: 0.0 standard drinks      Drug use: No       Functional Status:  Prior to admission patient needed assistance:              Mental Health Status:          Chemical Dependency Status:                Values/Beliefs:  Spiritual, Cultural Beliefs, Lutheran Practices, Values that affect care:                 Additional Information:  PT has recommended home PT for the pt to improve independence with bed mobility, transfers, and gait. I discussed this with the pt's son Karl. The pt son declines home care PT. The pt son states that the pt's hernia was preventing him from mobility and this is now removed. The pt son states that the pt was able to walk to the bathroom today without assistance. He states that there is family available in the home to help the pt if he is in need.  Pt son DECLINES home PT.    Katherine Collazo RN

## 2021-04-11 ENCOUNTER — PATIENT OUTREACH (OUTPATIENT)
Dept: CARE COORDINATION | Facility: CLINIC | Age: 81
End: 2021-04-11

## 2021-04-11 ENCOUNTER — ANCILLARY PROCEDURE (OUTPATIENT)
Dept: ULTRASOUND IMAGING | Facility: CLINIC | Age: 81
End: 2021-04-11
Attending: STUDENT IN AN ORGANIZED HEALTH CARE EDUCATION/TRAINING PROGRAM
Payer: MEDICARE

## 2021-04-11 VITALS
HEART RATE: 97 BPM | SYSTOLIC BLOOD PRESSURE: 109 MMHG | RESPIRATION RATE: 16 BRPM | TEMPERATURE: 99 F | DIASTOLIC BLOOD PRESSURE: 48 MMHG | OXYGEN SATURATION: 99 %

## 2021-04-11 LAB
ERYTHROCYTE [DISTWIDTH] IN BLOOD BY AUTOMATED COUNT: 16.2 % (ref 10–15)
FIBRINOGEN PPP-MCNC: 114 MG/DL (ref 200–420)
HCT VFR BLD AUTO: 21.8 % (ref 40–53)
HGB BLD-MCNC: 7.2 G/DL (ref 13.3–17.7)
INR PPP: 2.99 (ref 0.86–1.14)
LACTATE BLD-SCNC: 1.8 MMOL/L (ref 0.7–2)
LACTATE BLD-SCNC: 2.6 MMOL/L (ref 0.7–2)
MCH RBC QN AUTO: 28.9 PG (ref 26.5–33)
MCHC RBC AUTO-ENTMCNC: 33 G/DL (ref 31.5–36.5)
MCV RBC AUTO: 88 FL (ref 78–100)
PLATELET # BLD AUTO: 59 10E9/L (ref 150–450)
RBC # BLD AUTO: 2.49 10E12/L (ref 4.4–5.9)
WBC # BLD AUTO: 4.8 10E9/L (ref 4–11)

## 2021-04-11 PROCEDURE — 258N000003 HC RX IP 258 OP 636: Performed by: NURSE PRACTITIONER

## 2021-04-11 PROCEDURE — 85027 COMPLETE CBC AUTOMATED: CPT | Performed by: NURSE PRACTITIONER

## 2021-04-11 PROCEDURE — 76705 ECHO EXAM OF ABDOMEN: CPT | Mod: 26 | Performed by: STUDENT IN AN ORGANIZED HEALTH CARE EDUCATION/TRAINING PROGRAM

## 2021-04-11 PROCEDURE — 36415 COLL VENOUS BLD VENIPUNCTURE: CPT | Performed by: NURSE PRACTITIONER

## 2021-04-11 PROCEDURE — 250N000013 HC RX MED GY IP 250 OP 250 PS 637: Performed by: SURGERY

## 2021-04-11 PROCEDURE — 83605 ASSAY OF LACTIC ACID: CPT | Performed by: STUDENT IN AN ORGANIZED HEALTH CARE EDUCATION/TRAINING PROGRAM

## 2021-04-11 PROCEDURE — 99232 SBSQ HOSP IP/OBS MODERATE 35: CPT | Performed by: PEDIATRICS

## 2021-04-11 PROCEDURE — 36415 COLL VENOUS BLD VENIPUNCTURE: CPT | Performed by: STUDENT IN AN ORGANIZED HEALTH CARE EDUCATION/TRAINING PROGRAM

## 2021-04-11 PROCEDURE — 85384 FIBRINOGEN ACTIVITY: CPT | Performed by: STUDENT IN AN ORGANIZED HEALTH CARE EDUCATION/TRAINING PROGRAM

## 2021-04-11 PROCEDURE — 250N000013 HC RX MED GY IP 250 OP 250 PS 637: Performed by: NURSE PRACTITIONER

## 2021-04-11 PROCEDURE — 250N000013 HC RX MED GY IP 250 OP 250 PS 637: Performed by: STUDENT IN AN ORGANIZED HEALTH CARE EDUCATION/TRAINING PROGRAM

## 2021-04-11 PROCEDURE — 83605 ASSAY OF LACTIC ACID: CPT | Performed by: NURSE PRACTITIONER

## 2021-04-11 PROCEDURE — 99207 PR CONSULT E&M CHANGED TO SUBSEQUENT LEVEL: CPT | Performed by: PEDIATRICS

## 2021-04-11 PROCEDURE — 85610 PROTHROMBIN TIME: CPT | Performed by: STUDENT IN AN ORGANIZED HEALTH CARE EDUCATION/TRAINING PROGRAM

## 2021-04-11 RX ADMIN — SODIUM CHLORIDE, POTASSIUM CHLORIDE, SODIUM LACTATE AND CALCIUM CHLORIDE 500 ML: 600; 310; 30; 20 INJECTION, SOLUTION INTRAVENOUS at 07:03

## 2021-04-11 RX ADMIN — POLYETHYLENE GLYCOL 3350 17 G: 17 POWDER, FOR SOLUTION ORAL at 08:33

## 2021-04-11 RX ADMIN — FUROSEMIDE 20 MG: 20 TABLET ORAL at 08:33

## 2021-04-11 RX ADMIN — CIPROFLOXACIN HYDROCHLORIDE 500 MG: 500 TABLET, FILM COATED ORAL at 08:33

## 2021-04-11 RX ADMIN — LACTULOSE 20 G: 20 SOLUTION ORAL at 08:33

## 2021-04-11 RX ADMIN — SPIRONOLACTONE 50 MG: 50 TABLET, FILM COATED ORAL at 08:33

## 2021-04-11 ASSESSMENT — ACTIVITIES OF DAILY LIVING (ADL)
ADLS_ACUITY_SCORE: 14
ADLS_ACUITY_SCORE: 15

## 2021-04-11 NOTE — PROVIDER NOTIFICATION
04/11/21 0600   Call Information   Date of Call 04/11/21   Time of Call 0641   Name of person requesting the team Yani   Title of person requesting team RN   RRT Arrival time 0642   Time RRT ended 0658   Reason for call   Type of RRT Adult   Primary reason for call Sepsis suspected   Was patient transferred from the ED, ICU, or PACU within last 24 hours prior to RRT call? No   SBAR   Situation LA 2.6   Background 82 y/o m w/ PMH of cirrhosis secondary to WOODWARD receiving weekly paracentesis s/p TIPS 12/2020, HCC s/p radio embolization, anemia, thrombocytopenia, alzheimer's, umbilical hernia, who presents for direct admission for concerns of a direct connection through his umbilical hernia to the peritoneal cavity. His vitals are stable, has no leukocytosis, and an exam consistent w/ an ulcerated umbilical hernia w/ a tract putting out significant ascites fluid. He has a class C child-pughs score of 11 based on today's labs. At this time there is not concern of intraabdominal infection. He was started on clindamycin for possible infection at the site of his umbilical hernia, we will likely place him on abx for a few days to treat this.           Notable History/Conditions Hypertension;Neurological  (Cirrhosis, Dementia)   Assessment AOx4 (per son), VSS, denies SOB or pain, not drinking much   Interventions Fluid bolus   Patient Outcome   Patient Outcome Stabilized on unit   RRT Team   Physician(s) Yolette Stuart, NP   Lead RN Sabine Lomeli   RT NA   Post RRT Intervention Assessment   Post RRT Assessment Stable/Improved   Date Follow Up Done 04/11/21   Time Follow Up Done 0843   Comments Recheck lactic acid at 1100

## 2021-04-11 NOTE — PLAN OF CARE
Sleeping most of shift. Awoke to eat dinner, good appetite.VSS except T max 99.9 at HS (Tylenol given). Son here for translation, speaks Thompson (can speak small amount os basic English). Appears comfortable, without complaints per son. Voids 300 mL this shift. Awaiting BM (Lactulose given). Offering bathroom assistance often. Does not use call light this shift, frequent rounding.

## 2021-04-11 NOTE — PROGRESS NOTES
Rapid Response Team Note    Assessment   In assessment a rapid response was called on Robb Vallejo due to SIRS/Sepsis trigger and lactic acidosis. This presentation is likely due to hypovolemia and worsened by WOODWARD cirrhosis .     Plan   -   ml  - recheck lactic in 4 hours    -  The Internal Medicine primary team was able to be reached and they are in agreement with the above plan.  -  Disposition: The patient will remain on the current unit. We will continue to monitor this patient closely.     -  Reassessment and plan follow-up will be performed by the primary team      OPAL Mckenzie CNP  [facility] RRT AMCOM Job Code Contact #[phone]    Hospital Course   Brief Summary of events leading to rapid response:   BPA for sepsis eval. He is on diuretics of lasix and spionalactone BID and he states that he has not been drinking adequate fluids. He denies pain, shortness of breath or dizziness    Admission Diagnosis:   Abdominal ascites [R18.8]     Physical Exam   Temp: 99  F (37.2  C) Temp  Min: 97.8  F (36.6  C)  Max: 99.9  F (37.7  C)  Resp: 18 Resp  Min: 18  Max: 18  SpO2: 100 % SpO2  Min: 98 %  Max: 100 %  Pulse: 109 Pulse  Min: 84  Max: 109    No data recorded  BP: 114/42 Systolic (24hrs), Av , Min:102 , Max:120   Diastolic (24hrs), Av, Min:36, Max:55     I/Os: I/O last 3 completed shifts:  In: 240 [P.O.:240]  Out: 600 [Urine:600]     Exam:   General: chronically ill appearing  Mental Status: baseline mental status.      Significant Results and Procedures   Lactic Acid:   Recent Labs   Lab Test 21  0636 04/10/21  0053 218 01/15/21  0957 01/15/21  0303   LACT  --   --  1.7 1.8 2.8*   LACTS 2.6* 2.0  --   --   --      CBC:   Recent Labs   Lab Test 04/10/21  0053 21  0748 21  0701 21  1637 21  2218   WBC  --   --  2.8* 4.1 4.7   HGB 7.2* 6.6* 6.6* 8.3* 8.5*   HCT  --   --  21.6* 25.4* 25.7*   PLT  --   --  54* 89* 96*        Sepsis Evaluation    The patient is not known to have an infection. He is on prophylaxis cipro  NO EVIDENCE OF SEPSIS at this time.  Vital sign, physical exam, and lab findings are likely due to hypovolemia.

## 2021-04-11 NOTE — PLAN OF CARE
[id number]    Pt discharged this shift, IV removed, Son providing transport. No meds for pickup. AVS given and signed.     /48 (BP Location: Right arm)   Pulse 97   Temp 99  F (37.2  C) (Oral)   Resp 16   SpO2 99%

## 2021-04-11 NOTE — CONSULTS
CAPS Paracentesis Consult    - No safe fluid pocket for diagnostic or therapeutic paracentesis.  Trace ascites.  Please see POCUS abdominal images.     Olamide Saleh DO, MS   of Medicine  General Internal Medicine  [facility] [place]  Text Page (8am - 5pm)

## 2021-04-11 NOTE — PLAN OF CARE
Time: 5307-0081     Reason for admission: Hernia repair d/t leaking ascites     A&Ox4, Thompson speaking, using son as . Up w/ SBA, bed alarm on d/t not using call light. VSS on RA ex soft BPs, intermittently tachy, afebrile. Mild tenderness at midline incision, site WNL. Denies pain/nausea/SOB. Regular diet. R PIV SL. Slept well overnight     Shift Highlights: Pt had 2 large soft/loose BMs, no s/s of bleeding. Last Hgb 7.2, will be rechecking CBC this AM. Triggered sepsis w/ AM VS d/t tachycardia -- LA 2.6, provider notified + RRT called -- 500mL LR bolus started infusing    Plan: LA recheck at 1100. Possible paracentesis before discharge

## 2021-04-11 NOTE — DISCHARGE SUMMARY
General Surgery Discharge Summary    Name: Robb Shabazz  MRN: [id number]  : 1940    Admission Date: 21   Discharge Date: 21    Preoperative Diagnosis:  - Leaking umbilical hernia  - WOODWARD liver cirrhosis  - History of TIPS  - HCC s/p radio embolization    Procedures Performed  - Umbilical hernia repair      Postoperative Diagnosis  - WOODWARD liver cirrhosis  - History of TIPS  - HCC s/p radio embolization  - s/p umbilical hernia repair     Intraoperative Complications: none  Postoperative Complications:  none    Pathology Results: none  Culture Results: none  Drain/tube present at discharge: none    Hospitalization Imaging    POC US (21)  Impression: There was very minimal free fluid below bilateral diaphragms, in the splenorenal or hepatorenal space, or in bilateral paracolic gutters. There was minimal free fluid around the urinary bladder.    Hospital Course:    Robb Vallejo is a 80 y/o male with PMHx significant for WOODWARD liver cirrhosis s/p TIPS and HCC s/p radio embolization, who initially presented to OSH ED for evaluation of a leaking umbilical hernia. Patient has chronic leakage from hernia, for which patient undergoes recurrent paracentesis. Higher rate of leakage as compared to normal  Transferred here for operative repair due to concern for development of SBP. Umbilical hernia was repaired without complication (). Hgb 6.6 POD1, so received 1 U pRBC, Hgb increased to 7.2 on POD2. Hepatology consulted and recommended diagnostic/therapeutic paracentesis. CAPS teams preformed bedside US, which revealed no ascitic pocket large enough to drain. A rapid response was called  AM for tachycardia. At the time of the response his VSS w/o leukocytosis. Lactate was 2.6, likely his normal due to issues with clearance. He was given a 500mL fluid bolus. Medicine was consulted given his co morbidities. They recommended second attempt at paracentesis  which also demonstrated  no safe window for drainage. By 04/11 the patient had stable VSS, Hg was stable, he was tolerating a regular diet and had appropriate ROBF, he was ambulating, and ultimately deemed ready for dishcharge to home.     Hospitalization Consults:  - GI Hepatology  - Internal Medicine Procedure Team  - Medicine     Discharge Exam     Gen: AAOx3, NAD  Pulm: Non-labored breathing  Abd: Soft, non-distended, appropriately tender, umbilical incision c/d/i w/ exofin mesh   Extremities: Warm and well-perfused    Discharge Instructions  Follow up with OP hepatologist this week-increase his OP paracentesis to 2x per week for surgical wound protection (hepatology to arrange)  Follow up as OP for further evaluation of pancytopenia     Discharge Medications     Robb Vallejo   Home Medication Instructions [id number]    Printed on:04/11/21 0812   Medication Information                      ciprofloxacin (CIPRO) 500 MG tablet  Take 1 tablet (500 mg) by mouth daily For SBP prophylaxis             docusate sodium (COLACE) 100 MG capsule  Take 100 mg by mouth daily as needed for constipation             furosemide (LASIX) 20 MG tablet  Take 20 mg by mouth 2 times daily              lactulose (CONSTULOSE) 10 GM/15ML solution  Take 20 g by mouth 2 times daily May decrease to once per day if 2 stools per day             spironolactone (ALDACTONE) 50 MG tablet  Take 50 mg by mouth 2 times daily               Addendum:    I have reviewed the medical student note above and agree with its contents.    Grzegorz Block MD  PGY-1

## 2021-04-11 NOTE — CONSULTS
[facility]  Consult Note - Hospitalist Service, Gold      Date of Admission:  4/8/2021  Consult Requested by: General Surgery, Dr. Freed  Reason for Consult: Medical co-management of WOODWARD cirrhosis s/p TIPs and HCC following umbilical hernia repair in setting of leaking umbilical hernia       Assessment & Plan   Robb Vallejo is a 81 year old male admitted on 4/8/2021. 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  Taken to the OR on 4/8 urgently and is now s/p an open umbilical hernia repair (no mesh).  Internal medicine has been consulted for medical co-management of his underlying WOODWARD cirrhosis and anemia.     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  Pain well controlled presently.   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  Cirrhosis with significant ascites currently requiring weekly paracentesis with removal of 2-4L per time.  Last paracentesis 4/8.  Underwent TIPS (12/28/2020).  Currently on Lasix 20 mg BID, Spironolactone 50 mg BID, Lactulose 20 gm BID and Cipr 500 mg daily.  No current s/s of HE or SBP. MELD-Na 19 today.   - Continue PTA Lactulose, monitor for s/s of HE  - Continue PTA  Lasix and Spironolactone   - Per hepatology recommendation, increase his OP paracentesis to 2x per week for surgical wound protection (hepatology to arrange)  - CAPS consulted to evaluate if he would benefit from paracenteses on 4/11 prior to discharge   - Follow up with OP hepatologist this week    #Slow transit constipation  Last BM per family 4/5 despite Lactulose BID.  Possible contribution from narcotics following surgery.   - c/w Miralax daily with PRN dulcolax suppository (ordered for you)    #Pancytopenia  Likely due to cirrhosis.  Appears as though his most recent baseline Hgb is ~ 7.  No current s/s of active bleeding, no significant intra-operative blood loss.  Likely due to known cirrhosis and recent stress of surgery.  Received 1 unit pRBC on 4/9 for Hgb 6.6, currently 7.2.   - CBC in AM, if stable ok to follow up as OP   - Monitor for s/s of bleeding     #Hx HCC s/p radio embolization  Follows at [facility].  Follow up MRIs showed (last 3/5/2021) the lesions to be nonviable and smaller.    - Follow up as OP     #Dementia  Son is primary care giver.  IP PT has recommended home PT but appears as though family has declined.     #Abnormally elevated lactate, mild lactic acidemia  - minimally elevated lactate in the setting of ESLD and hemodynamic stability; this is likely related to his liver disease and unlikely reflective of lactic acidosis, and very well could be his baseline and would prohibit discharge from and IM perspective     The patient's care was discussed with the Attending Physician, Dr. Lawson.    Elpidio Lawson MD  [facility]  Contact information available via 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  Taken to the OR on 4/8 urgently and is now s/p an open umbilical hernia repair (no mesh).  Internal medicine has been consulted for medical co-management of his underlying WOODWARD cirrhosis and anemia.     Son is at bedside as the patient speaks only Tigrinya and he translates for him.  Currently, he is doing well.  He is having some mild abdominal discomfort.  Son notes that he has not had a BM since last Monday.  Son also feels that he has built up some ascites.  Otherwise, no difficulty breathing, no chest pain or dysuria.       Review of Systems     Past Medical History    I have reviewed this patient's medical history and updated it with pertinent information if needed.   Past Medical History:   Diagnosis Date     Cirrhosis (H)      Dementia (H)      Hypertension        Past Surgical History   I have reviewed this patient's surgical history and updated it with pertinent information if needed.  Past Surgical History:   Procedure Laterality Date     HERNIORRHAPHY UMBILICAL N/A 4/8/2021    Procedure: HERNIORRHAPHY, UMBILICAL, OPEN;  Surgeon: Dylon Freed MD;  Location:  OR       Social History   I have reviewed this patient's social history and updated it with pertinent information if needed.  Social History     Tobacco Use     Smoking status: Unknown If Ever Smoked     Tobacco comment: Current non-smoker.   Substance Use Topics     Alcohol use: No     Alcohol/week: 0.0 standard drinks     Drug use: No       Family History   No significant family history    Medications    Current Facility-Administered Medications   Medication     acetaminophen (TYLENOL) tablet 650 mg     benzocaine-menthol (CEPACOL) 15-3.6 MG lozenge 1 lozenge     bisacodyl (DULCOLAX) Suppository 10 mg     ciprofloxacin (CIPRO) tablet 500 mg     docusate sodium (COLACE) capsule 100 mg     furosemide (LASIX) tablet 20 mg     lactated ringers BOLUS 500 mL     lactulose (CHRONULAC) solution 20 g     lidocaine (LMX4) cream     lidocaine (LMX4) cream     lidocaine 1 % 0.1-1 mL     lidocaine 1 % 0.1-1 mL     naloxone (NARCAN) injection 0.2 mg     naloxone (NARCAN) injection 0.2 mg     naloxone (NARCAN) injection 0.4 mg     naloxone (NARCAN) injection 0.4 mg     ondansetron (ZOFRAN-ODT) ODT tab 4 mg    Or     ondansetron (ZOFRAN) injection 4 mg     polyethylene glycol (MIRALAX) Packet 17 g     prochlorperazine (COMPAZINE) injection 5 mg    Or     prochlorperazine (COMPAZINE) tablet 5 mg     sodium chloride (PF) 0.9% PF flush 3 mL     sodium chloride (PF) 0.9% PF flush 3 mL     sodium chloride (PF) 0.9% PF flush 3 mL     sodium chloride (PF) 0.9% PF flush 3 mL     sodium chloride (PF) 0.9% PF flush 3 mL     sodium chloride (PF) 0.9% PF flush 3 mL     spironolactone (ALDACTONE) tablet 50 mg       Allergies   Allergies   Allergen Reactions     Penicillins Other (See Comments)     Syncope       Propranolol      Hallucinations, Passed out.        Physical Exam   Vital Signs: Temp: 99  F (37.2  C) Temp src: Oral BP: 109/48 Pulse: 97   Resp: 16 SpO2: 99 % O2 Device: None (Room air)    Weight: 0 lbs 0 oz    General: frail elderly man lying flat in bed, asleep but rouses to voice  Head: NC, AT  Eye: symm gaze, anicteric sclerae  ENT: patent nares wo drainage/epistaxis, MMM  Pulm: CTAB posteriuorly, comfortable WOB on RA  CV: normal rate, regular rhythm, grade II/VI holosystolic murmur throughout precordium best at RUSB  GI: soft, NTND  Neuro: awake, alert, hearing speech and phonation, intact grossly       SUBJECTIVE:  Hx per son,  who speaks pts dialect  No pain, belly feels fine, not overly full  Son says slept well and ate well, which is a good sign for him tpyically  Son asking about lactate, asking many questions which were answered to satisfaction        Data   Results for orders placed or performed during the hospital encounter of 04/08/21 (from the past 24 hour(s))   Internal Medicine Adult IP Consult for [facility]: Patient to be seen: Routine within 24 hrs; Call back #: [phone]; 81m cirrhosis 2/2 WOODWARD s/p TIPS presented 4/8 with Flood syndrome s/p repair. Assistance in management of post-surgical course; C...    Narrative    Ally Pulliam NP     4/10/2021  3:05 PM  [facility]  Consult Note - Hospitalist Service, Gold      Date of Admission:  4/8/2021  Consult Requested by: General Surgery, Dr. Freed  Reason for Consult: Medical co-management of WOODWARD cirrhosis s/p   TIPs and HCC following umbilical hernia repair in setting of   leaking umbilical hernia     Assessment & Plan   Robb Vallejo is a 81 year old male admitted on 4/8/2021. 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  Taken to the OR on 4/8   urgently and is now s/p an open umbilical hernia repair (no   mesh).  Internal medicine has been consulted for medical   co-management of his underlying WOODWARD cirrhosis and anemia.     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  Pain   well controlled presently.   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  Cirrhosis   with significant ascites currently requiring weekly paracentesis   with removal of 2-4L per time.  Last paracentesis 4/8.  Underwent   TIPS (12/28/2020).  Currently on Lasix 20 mg BID, Spironolactone   50 mg BID, Lactulose 20 gm BID and Cipr 500 mg daily.  No current   s/s of HE or SBP. MELD-Na 19 today.   - Continue PTA Lactulose, monitor for s/s of HE  - Continue PTA Lasix and Spironolactone   - Per hepatology recommendation, increase his OP paracentesis to   2x per week for surgical wound protection (hepatology to arrange)  - CAPS consult to evaluate if he would benefit from paracenteses   on 4/11 prior to discharge (ordered and d/w provider)  - Follow up with OP hepatologist this week    #Slow transit constipation  Last BM per family 4/5 despite Lactulose BID.  Possible   contribution from narcotics following surgery.   - Additional 20 gm Lactulose this afternoon (ordered for you)  - Add Miralax daily with PRN dulcolax suppository (ordered for   you)    #Pancytopenia  Likely due to cirrhosis.  Appears as though his most recent   baseline Hgb is ~ 7.  No current s/s of active bleeding, no   significant intra-operative blood loss.  Likely due to known   cirrhosis and recent stress of surgery.  Received 1 unit pRBC on   4/9 for Hgb 6.6, currently 7.2.   - CBC in AM, if stable ok to follow up as OP   - Monitor for s/s of bleeding     #Hx HCC s/p radio embolization  Follows at [facility].  Follow up MRIs showed (last 3/5/2021) the   lesions to be nonviable and smaller.    - Follow up as OP     #Dementia  Son is primary care giver.  IP PT has recommended home PT but   appears as though family has declined.        The patient's care was discussed with the Attending Physician,   Dr. Lawson.    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  Taken to the OR on 4/8   urgently and is now s/p an open umbilical hernia repair (no   mesh).  Internal medicine has been consulted for medical   co-management of his underlying WOODWARD cirrhosis and anemia.     Son is at bedside as the patient speaks only Tigrinya and he   translates for him.  Currently, he is doing well.  He is having   some mild abdominal discomfort.  Son notes that he has not had a   BM since last Monday.  Son also feels that he has built up some   ascites.  Otherwise, no difficulty breathing, no chest pain or   dysuria.       Review of Systems   The 10 point Review of Systems is negative other than noted in   the HPI or here.     Past Medical History    I have reviewed this patient's medical history and updated it   with pertinent information if needed.   Past Medical History:   Diagnosis Date     Cirrhosis (H)      Dementia (H)      Hypertension        Past Surgical History   I have reviewed this patient's surgical history and updated it   with pertinent information if needed.  Past Surgical History:   Procedure Laterality Date     HERNIORRHAPHY UMBILICAL N/A 4/8/2021    Procedure:  HERNIORRHAPHY, UMBILICAL, OPEN;  Surgeon: Dylon Freed MD;  Location: [facility] OR       Social History   I have reviewed this patient's social history and updated it with   pertinent information if needed.  Social History     Tobacco Use     Smoking status: Unknown If Ever Smoked     Tobacco comment: Current non-smoker.   Substance Use Topics     Alcohol use: No     Alcohol/week: 0.0 standard drinks     Drug use: No       Family History   No significant family history    Medications   Current Facility-Administered Medications   Medication     acetaminophen (TYLENOL) tablet 650 mg     benzocaine-menthol (CEPACOL) 15-3.6 MG lozenge 1 lozenge     bisacodyl (DULCOLAX) Suppository 10 mg     ciprofloxacin (CIPRO) tablet 500 mg     docusate sodium (COLACE) capsule 100 mg     furosemide (LASIX) tablet 20 mg     lactulose (CHRONULAC) solution 20 g     lidocaine (LMX4) cream     lidocaine (LMX4) cream     lidocaine 1 % 0.1-1 mL     lidocaine 1 % 0.1-1 mL     naloxone (NARCAN) injection 0.2 mg     naloxone (NARCAN) injection 0.2 mg     naloxone (NARCAN) injection 0.4 mg     naloxone (NARCAN) injection 0.4 mg     ondansetron (ZOFRAN-ODT) ODT tab 4 mg    Or     ondansetron (ZOFRAN) injection 4 mg     polyethylene glycol (MIRALAX) Packet 17 g     prochlorperazine (COMPAZINE) injection 5 mg    Or     prochlorperazine (COMPAZINE) tablet 5 mg     sodium chloride (PF) 0.9% PF flush 3 mL     sodium chloride (PF) 0.9% PF flush 3 mL     sodium chloride (PF) 0.9% PF flush 3 mL     sodium chloride (PF) 0.9% PF flush 3 mL     sodium chloride (PF) 0.9% PF flush 3 mL     sodium chloride (PF) 0.9% PF flush 3 mL     spironolactone (ALDACTONE) tablet 50 mg       Allergies   Allergies   Allergen Reactions     Penicillins Other (See Comments)     Syncope       Propranolol      Hallucinations, Passed out.        Physical Exam   Vital Signs: Temp: 99  F (37.2  C) Temp src: Oral BP: 102/46   Pulse: 84   Resp: 18 SpO2: 100 % O2 Device: None  (Room air)    Weight: 0 lbs 0 oz    General Appearance: NAD, resting comfortably in bed.   Eyes: PERRLA  HEENT: No lymphadenopathy, MMM.   Respiratory: Lungs CTAB, no wheezes or rales.   Cardiovascular: RRR, S1S2.  Grade IV/VI holosystolic murmur   GI: Abdomen soft, NTND.  Umbilical hernia incision CDI.   Skin: No peripheral edema.  Distal pulses intact.   Musculoskeletal: No joint swelling or tenderness.   Neurologic: A+O x 3. No asterixis   Psychiatric: Mood stable.     Data   Results for orders placed or performed during the hospital   encounter of 04/08/21 (from the past 24 hour(s))   Lactic acid level STAT   Result Value Ref Range    Lactate for Sepsis Protocol 2.0 0.7 - 2.0 mmol/L   Hemoglobin   Result Value Ref Range    Hemoglobin 7.2 (L) 13.3 - 17.7 g/dL      CBC with platelets   Result Value Ref Range    WBC 4.8 4.0 - 11.0 10e9/L    RBC Count 2.49 (L) 4.4 - 5.9 10e12/L    Hemoglobin 7.2 (L) 13.3 - 17.7 g/dL    Hematocrit 21.8 (L) 40.0 - 53.0 %    MCV 88 78 - 100 fl    MCH 28.9 26.5 - 33.0 pg    MCHC 33.0 31.5 - 36.5 g/dL    RDW 16.2 (H) 10.0 - 15.0 %    Platelet Count 59 (L) 150 - 450 10e9/L   Lactic acid level STAT   Result Value Ref Range    Lactate for Sepsis Protocol 2.6 (H) 0.7 - 2.0 mmol/L

## 2021-04-11 NOTE — PROGRESS NOTES
Surgery Progress Note         Subjective:  Rapid response called this AM. Lactate elevated to 2.6. VSS.  not available this AM.     Objective:  Temp:  [97.8  F (36.6  C)-99.9  F (37.7  C)] 99  F (37.2  C)  Pulse:  [] 97  Resp:  [16-18] 16  BP: (102-120)/(36-55) 109/48  SpO2:  [98 %-100 %] 99 %  I/O last 3 completed shifts:  In: 240 [P.O.:240]  Out: 300 [Urine:300]    Gen: NAD  Pulm: Non-labored breathing  Cards: Normotensive  Abd: non-tender, no drainage from hernia, no erythema or induration. Interval distension from prior     A/P:  80 y/o m w/ PMH of cirrhosis secondary to WOODWARD receiving weekly paracentesis s/p TIPS 12/2020, HCC s/p radio embolization, anemia, thrombocytopenia, alzheimer's, umbilical hernia leaking ascites through ulceration now s/p open umbilical hernia repair 04/08. He is progressing well.     -Hepatology was consulted and recommended diagnostic/therapeutic paracentesis. CAPS notified, unable access a safe window 04/09  -medicine consulted 04/10 and recommended paracentesis 04/11, CAPS consulted and pending  -elevated lactate, likely related to clearance issues, VSS and no WBC count or fever, looks well this morning, since <3 no need to repeat after discussion with medicine    -continue ppx cirp  -ADAT  -home medications   -Acetaminophen for pain <1g/day     -following paracentesis ok to discharge     Seen and discussed with chief resident Dr. Sarah Block MD  PGY-1

## 2021-04-12 ENCOUNTER — PATIENT OUTREACH (OUTPATIENT)
Dept: SURGERY | Facility: CLINIC | Age: 81
End: 2021-04-12

## 2021-04-12 NOTE — PROGRESS NOTES
The patient will be contacted by another surgery RNCC for post-hospital follow up. Will close encounter at this time.    Kristen Crabtree CMA, [facility]  Post Hospital Discharge Team

## 2021-04-12 NOTE — PROGRESS NOTES
Robb Vallejo is a patient of Dr. Dylon Freed that underwent open umbilical hernia repair approximately 4 days ago (4/8).  He was recently discharged from the hospital.  Attempted to contact patient via telephone for a status update and review post op teaching.  LM on VM to call office with the assistance of a Thompson .  Await return call.      Of note:  Wound:  Skin Sealant  Follow-up:  4/21 at 0800 with Dr. Freed- [facility]. Paracentesis twice weekly.   Restrictions:  - No strenuous exercise for 3-4 weeks  - No lifting, pushing, pulling more than 15-20 pounds for 3-4 weeks  New medications:  Tylenol (no more than 1000 mg/day)  Equipment/Supplies:  None    ADDENDUM  04/20/21  10:23 AM  Attempted to reach patient x 2 for post op call. No answer, LM on VM.  Patient has a clinic visit scheduled tomorrow, 4/21, with Dr. Freed.  Encounter closed.

## 2021-04-12 NOTE — PLAN OF CARE
Physical Therapy Discharge Summary    Reason for therapy discharge:    Discharged to home with home therapy.    Progress towards therapy goal(s). See goals on Care Plan in [facility] electronic health record for goal details.  Goals partially met.  Barriers to achieving goals:   discharge from facility.    Therapy recommendation(s):    Continued therapy is recommended.  Rationale/Recommendations:  to progress functional strength and safety with IND mobility.

## 2021-04-13 ENCOUNTER — HOSPITAL ENCOUNTER (OUTPATIENT)
Dept: ULTRASOUND IMAGING | Facility: CLINIC | Age: 81
Discharge: HOME | End: 2021-04-13
Attending: INTERNAL MEDICINE | Admitting: INTERNAL MEDICINE
Payer: MEDICARE

## 2021-04-13 DIAGNOSIS — R18.8 OTHER ASCITES: ICD-10-CM

## 2021-04-13 PROCEDURE — 76705 ECHO EXAM OF ABDOMEN: CPT

## 2021-04-13 NOTE — IR NOTE
US evaluation with mild ascites.  After discussion, we have opted to NOT perform paracentesis today, and US will be used for reevaluation in 3 days.  This is patient's preference, and is reasonable.  S/P TIPS several months prior and umbilical hernia repair about 1 week prior.    Parag Hartman

## 2021-04-16 ENCOUNTER — HOSPITAL ENCOUNTER (OUTPATIENT)
Dept: ULTRASOUND IMAGING | Facility: CLINIC | Age: 81
Discharge: HOME | End: 2021-04-16
Attending: INTERNAL MEDICINE | Admitting: INTERNAL MEDICINE
Payer: MEDICARE

## 2021-04-16 DIAGNOSIS — R18.8 OTHER ASCITES: ICD-10-CM

## 2021-04-16 PROCEDURE — 76705 ECHO EXAM OF ABDOMEN: CPT

## 2021-04-19 DIAGNOSIS — Z11.59 ENCOUNTER FOR SCREENING FOR OTHER VIRAL DISEASES: ICD-10-CM

## 2021-04-19 LAB
SARS-COV-2 RNA RESP QL NAA+PROBE: NORMAL
SPECIMEN SOURCE: NORMAL

## 2021-04-19 PROCEDURE — U0003 INFECTIOUS AGENT DETECTION BY NUCLEIC ACID (DNA OR RNA); SEVERE ACUTE RESPIRATORY SYNDROME CORONAVIRUS 2 (SARS-COV-2) (CORONAVIRUS DISEASE [COVID-19]), AMPLIFIED PROBE TECHNIQUE, MAKING USE OF HIGH THROUGHPUT TECHNOLOGIES AS DESCRIBED BY CMS-2020-01-R: HCPCS | Performed by: INTERNAL MEDICINE

## 2021-04-19 PROCEDURE — U0005 INFEC AGEN DETEC AMPLI PROBE: HCPCS | Performed by: INTERNAL MEDICINE

## 2021-04-20 ENCOUNTER — PATIENT OUTREACH (OUTPATIENT)
Dept: SURGERY | Facility: CLINIC | Age: 81
End: 2021-04-20

## 2021-04-20 NOTE — PROGRESS NOTES
Patient Telephone Reminder Call    Date of call:  04/20/21  Phone numbers:  Cell number on file:    Telephone Information:   Mobile [phone]       Reached patient/confirmed appointment:  No - left message:   on voicemail  Appointment with:   Dr. Dylon Freed  Reason for visit:  PO

## 2021-04-21 ENCOUNTER — OFFICE VISIT (OUTPATIENT)
Dept: SURGERY | Facility: CLINIC | Age: 81
End: 2021-04-21
Payer: MEDICARE

## 2021-04-21 VITALS
HEIGHT: 62 IN | WEIGHT: 106 LBS | RESPIRATION RATE: 18 BRPM | SYSTOLIC BLOOD PRESSURE: 123 MMHG | HEART RATE: 98 BPM | BODY MASS INDEX: 19.51 KG/M2 | OXYGEN SATURATION: 100 % | DIASTOLIC BLOOD PRESSURE: 59 MMHG | TEMPERATURE: 98.4 F

## 2021-04-21 DIAGNOSIS — Z98.890 POST-OPERATIVE STATE: Primary | ICD-10-CM

## 2021-04-21 PROCEDURE — 99024 POSTOP FOLLOW-UP VISIT: CPT | Performed by: SURGERY

## 2021-04-21 ASSESSMENT — MIFFLIN-ST. JEOR: SCORE: 1075.06

## 2021-04-21 ASSESSMENT — PAIN SCALES - GENERAL: PAINLEVEL: NO PAIN (0)

## 2021-04-21 NOTE — PROGRESS NOTES
"I saw Robb Vallejo in clinic today with his son.  He is status post open umbilical hernia repair- done urgently on 4/8/21 for skin rupture with drainage of ascites.  He has had persistent ascites due to cirrhosis despite a patent TIPS.  He was discharged home on 4/11.  Due to his unique language, his son interpreted for us.    Since discharge he has done well.  Actually much better than anticipated.  His ascites has slowed tremendously- prior to the operation he was getting weekly paracentesis- but has not needed one at all since discharge.  No fever, eating ok.  Had a bout of confusion associated with reduced bowel movements- but his son is trying to make sure he has ~2 a day to prevent this.  No leakage from the skin site.      PE:  /59 (BP Location: Left arm, Patient Position: Sitting, Cuff Size: Adult Regular)   Pulse 98   Temp 98.4  F (36.9  C) (Oral)   Resp 18   Ht 1.575 m (5' 2\")   Wt 48.1 kg (106 lb)   SpO2 100%   BMI 19.39 kg/m    Alert and interactive, pleasant  RRR  No resp distress, no wheezing  Abd is soft, nondistended, no evidence of ascites.  The surgical glue barrier remains in place- I peeled it off without issue.  The incision is clean and intact, no skin separation.  No evidence of recurrent hernia, no seroma, no erythema.  There are no sutures to remove.    A/P:  Healing well after urgent, primary repair of leaking umbilical hernia in a cirrhotic patient.  No sign of infection, wound breakdown or hernia recurrence.  All questions answered and return precautions given.  He may follow-up as needed.  "

## 2021-04-21 NOTE — PATIENT INSTRUCTIONS
You met with Dr. Dylon Freed.      Today's visit instructions:    Return to the Surgery Clinic on an as needed basis.        If you have questions please contact Rose Marie SALAS during regular clinic hours, Monday through Friday 7:30 AM - 4:00 PM, or you can contact us via [facility] at anytime.       If you have urgent needs after-hours, weekends, or holidays please call the hospital at [phone] and ask to speak with our on-call General Surgery Team.    Appointment [phone], option #1   Nurse Advice (Rose Marie): [phone]   Surgery Scheduler (Connie): [phone]  Fax: [phone]

## 2021-04-21 NOTE — NURSING NOTE
"Chief Complaint   Patient presents with     Post-Op - General Surgery     Pt here for post op       Vitals:    04/21/21 0807   BP: 123/59   BP Location: Left arm   Patient Position: Sitting   Cuff Size: Adult Regular   Pulse: 98   Resp: 18   Temp: 98.4  F (36.9  C)   TempSrc: Oral   SpO2: 100%   Weight: 48.1 kg (106 lb)   Height: 1.575 m (5' 2\")       Body mass index is 19.39 kg/m .      KALEN Oh NREMT                      "

## 2021-04-21 NOTE — LETTER
"4/21/2021       RE: Robb Vallejo  [address] [facility] [address]     Dear Colleague,    Thank you for referring your patient, Robb Vallejo, to the [facility]AL SUR[facility]ERY [facility] [place] at [facility]. Please see a copy of my visit note below.    I saw Robb Vallejo in clinic today with his son.  He is status post open umbilical hernia repair- done urgently on 4/8/21 for skin rupture with drainage of ascites.  He has had persistent ascites due to cirrhosis despite a patent TIPS.  He was discharged home on 4/11.  Due to his unique language, his son interpreted for us.    Since discharge he has done well.  Actually much better than anticipated.  His ascites has slowed tremendously- prior to the operation he was getting weekly paracentesis- but has not needed one at all since discharge.  No fever, eating ok.  Had a bout of confusion associated with reduced bowel movements- but his son is trying to make sure he has ~2 a day to prevent this.  No leakage from the skin site.      PE:  /59 (BP Location: Left arm, Patient Position: Sitting, Cuff Size: Adult Regular)   Pulse 98   Temp 98.4  F (36.9  C) (Oral)   Resp 18   Ht 1.575 m (5' 2\")   Wt 48.1 kg (106 lb)   SpO2 100%   BMI 19.39 kg/m    Alert and interactive, pleasant  RRR  No resp distress, no wheezing  Abd is soft, nondistended, no evidence of ascites.  The surgical glue barrier remains in place- I peeled it off without issue.  The incision is clean and intact, no skin separation.  No evidence of recurrent hernia, no seroma, no erythema.  There are no sutures to remove.    A/P:  Healing well after urgent, primary repair of leaking umbilical hernia in a cirrhotic patient.  No sign of infection, wound breakdown or hernia recurrence.  All questions answered and return precautions given.  He may follow-up as needed.    Again, thank you for allowing me to participate in " the care of your patient.      Sincerely,    Dylon Freed MD

## 2021-04-23 ENCOUNTER — HOSPITAL ENCOUNTER (INPATIENT)
Facility: CLINIC | Age: 81
LOS: 2 days | Discharge: HOME | DRG: 442 | End: 2021-04-25
Attending: PHYSICIAN ASSISTANT | Admitting: INTERNAL MEDICINE
Payer: MEDICARE

## 2021-04-23 ENCOUNTER — APPOINTMENT (OUTPATIENT)
Dept: CT IMAGING | Facility: CLINIC | Age: 81
DRG: 442 | End: 2021-04-23
Attending: PHYSICIAN ASSISTANT
Payer: MEDICARE

## 2021-04-23 ENCOUNTER — APPOINTMENT (OUTPATIENT)
Dept: ULTRASOUND IMAGING | Facility: CLINIC | Age: 81
DRG: 442 | End: 2021-04-23
Attending: PHYSICIAN ASSISTANT
Payer: MEDICARE

## 2021-04-23 DIAGNOSIS — K76.82 HEPATIC ENCEPHALOPATHY (H): ICD-10-CM

## 2021-04-23 LAB
ALBUMIN SERPL-MCNC: 2.1 G/DL (ref 3.4–5)
ALP SERPL-CCNC: 125 U/L (ref 40–150)
ALT SERPL W P-5'-P-CCNC: 28 U/L (ref 0–70)
AMMONIA PLAS-SCNC: 98 UMOL/L (ref 10–50)
ANION GAP SERPL CALCULATED.3IONS-SCNC: 6 MMOL/L (ref 3–14)
AST SERPL W P-5'-P-CCNC: 54 U/L (ref 0–45)
BASOPHILS # BLD AUTO: 0 10E9/L (ref 0–0.2)
BASOPHILS NFR BLD AUTO: 0.3 %
BILIRUB SERPL-MCNC: 2.1 MG/DL (ref 0.2–1.3)
BUN SERPL-MCNC: 9 MG/DL (ref 7–30)
CALCIUM SERPL-MCNC: 7.9 MG/DL (ref 8.5–10.1)
CHLORIDE SERPL-SCNC: 111 MMOL/L (ref 94–109)
CO2 SERPL-SCNC: 20 MMOL/L (ref 20–32)
CREAT SERPL-MCNC: 0.62 MG/DL (ref 0.66–1.25)
DIFFERENTIAL METHOD BLD: ABNORMAL
EOSINOPHIL # BLD AUTO: 0.1 10E9/L (ref 0–0.7)
EOSINOPHIL NFR BLD AUTO: 2 %
ERYTHROCYTE [DISTWIDTH] IN BLOOD BY AUTOMATED COUNT: 18.1 % (ref 10–15)
GFR SERPL CREATININE-BSD FRML MDRD: >90 ML/MIN/{1.73_M2}
GLUCOSE SERPL-MCNC: 111 MG/DL (ref 70–99)
HCT VFR BLD AUTO: 29.3 % (ref 40–53)
HGB BLD-MCNC: 9.3 G/DL (ref 13.3–17.7)
IMM GRANULOCYTES # BLD: 0 10E9/L (ref 0–0.4)
IMM GRANULOCYTES NFR BLD: 0.5 %
LABORATORY COMMENT REPORT: NORMAL
LACTATE BLD-SCNC: 2.5 MMOL/L (ref 0.7–2)
LACTATE BLD-SCNC: 2.6 MMOL/L (ref 0.7–2)
LYMPHOCYTES # BLD AUTO: 0.5 10E9/L (ref 0.8–5.3)
LYMPHOCYTES NFR BLD AUTO: 7.3 %
MCH RBC QN AUTO: 29.7 PG (ref 26.5–33)
MCHC RBC AUTO-ENTMCNC: 31.7 G/DL (ref 31.5–36.5)
MCV RBC AUTO: 94 FL (ref 78–100)
MONOCYTES # BLD AUTO: 0.5 10E9/L (ref 0–1.3)
MONOCYTES NFR BLD AUTO: 8 %
NEUTROPHILS # BLD AUTO: 5.3 10E9/L (ref 1.6–8.3)
NEUTROPHILS NFR BLD AUTO: 81.9 %
NRBC # BLD AUTO: 0 10*3/UL
NRBC BLD AUTO-RTO: 0 /100
PLATELET # BLD AUTO: 102 10E9/L (ref 150–450)
POTASSIUM SERPL-SCNC: 4.4 MMOL/L (ref 3.4–5.3)
PROT SERPL-MCNC: 7.1 G/DL (ref 6.8–8.8)
RBC # BLD AUTO: 3.13 10E12/L (ref 4.4–5.9)
SARS-COV-2 RNA RESP QL NAA+PROBE: NEGATIVE
SODIUM SERPL-SCNC: 137 MMOL/L (ref 133–144)
SPECIMEN SOURCE: NORMAL
WBC # BLD AUTO: 6.5 10E9/L (ref 4–11)

## 2021-04-23 PROCEDURE — 83605 ASSAY OF LACTIC ACID: CPT | Performed by: PHYSICIAN ASSISTANT

## 2021-04-23 PROCEDURE — 82140 ASSAY OF AMMONIA: CPT | Performed by: PHYSICIAN ASSISTANT

## 2021-04-23 PROCEDURE — 250N000011 HC RX IP 250 OP 636: Performed by: INTERNAL MEDICINE

## 2021-04-23 PROCEDURE — 70450 CT HEAD/BRAIN W/O DYE: CPT | Mod: ME

## 2021-04-23 PROCEDURE — 96360 HYDRATION IV INFUSION INIT: CPT

## 2021-04-23 PROCEDURE — 85025 COMPLETE CBC W/AUTO DIFF WBC: CPT | Performed by: PHYSICIAN ASSISTANT

## 2021-04-23 PROCEDURE — 36415 COLL VENOUS BLD VENIPUNCTURE: CPT | Performed by: PHYSICIAN ASSISTANT

## 2021-04-23 PROCEDURE — 76705 ECHO EXAM OF ABDOMEN: CPT

## 2021-04-23 PROCEDURE — 99207 PR APP CREDIT; MD BILLING SHARED VISIT: CPT | Performed by: PHYSICIAN ASSISTANT

## 2021-04-23 PROCEDURE — 99221 1ST HOSP IP/OBS SF/LOW 40: CPT | Mod: AI | Performed by: INTERNAL MEDICINE

## 2021-04-23 PROCEDURE — 250N000013 HC RX MED GY IP 250 OP 250 PS 637: Performed by: PHYSICIAN ASSISTANT

## 2021-04-23 PROCEDURE — C9803 HOPD COVID-19 SPEC COLLECT: HCPCS

## 2021-04-23 PROCEDURE — 250N000009 HC RX 250: Performed by: PHYSICIAN ASSISTANT

## 2021-04-23 PROCEDURE — 80053 COMPREHEN METABOLIC PANEL: CPT | Performed by: PHYSICIAN ASSISTANT

## 2021-04-23 PROCEDURE — 120N000001 HC R&B MED SURG/OB

## 2021-04-23 PROCEDURE — 250N000013 HC RX MED GY IP 250 OP 250 PS 637: Performed by: INTERNAL MEDICINE

## 2021-04-23 PROCEDURE — 258N000003 HC RX IP 258 OP 636: Performed by: PHYSICIAN ASSISTANT

## 2021-04-23 PROCEDURE — 99285 EMERGENCY DEPT VISIT HI MDM: CPT | Mod: 25

## 2021-04-23 PROCEDURE — 96361 HYDRATE IV INFUSION ADD-ON: CPT

## 2021-04-23 PROCEDURE — 87635 SARS-COV-2 COVID-19 AMP PRB: CPT | Performed by: PHYSICIAN ASSISTANT

## 2021-04-23 PROCEDURE — 87040 BLOOD CULTURE FOR BACTERIA: CPT | Performed by: PHYSICIAN ASSISTANT

## 2021-04-23 RX ORDER — FUROSEMIDE 20 MG
20 TABLET ORAL
Status: CANCELLED | OUTPATIENT
Start: 2021-04-23

## 2021-04-23 RX ORDER — SPIRONOLACTONE 25 MG/1
50 TABLET ORAL 2 TIMES DAILY
Status: CANCELLED | OUTPATIENT
Start: 2021-04-23

## 2021-04-23 RX ORDER — ONDANSETRON 2 MG/ML
4 INJECTION INTRAMUSCULAR; INTRAVENOUS EVERY 6 HOURS PRN
Status: DISCONTINUED | OUTPATIENT
Start: 2021-04-23 | End: 2021-04-25 | Stop reason: HOSPADM

## 2021-04-23 RX ORDER — HALOPERIDOL 5 MG/ML
1-2 INJECTION INTRAMUSCULAR EVERY 6 HOURS PRN
Status: DISCONTINUED | OUTPATIENT
Start: 2021-04-23 | End: 2021-04-25 | Stop reason: HOSPADM

## 2021-04-23 RX ORDER — LIDOCAINE 40 MG/G
CREAM TOPICAL
Status: DISCONTINUED | OUTPATIENT
Start: 2021-04-23 | End: 2021-04-25 | Stop reason: HOSPADM

## 2021-04-23 RX ORDER — ACETAMINOPHEN 325 MG/1
650 TABLET ORAL EVERY 8 HOURS PRN
Status: DISCONTINUED | OUTPATIENT
Start: 2021-04-23 | End: 2021-04-25 | Stop reason: HOSPADM

## 2021-04-23 RX ORDER — LACTULOSE 10 G/15ML
20 SOLUTION ORAL 3 TIMES DAILY PRN
Status: DISCONTINUED | OUTPATIENT
Start: 2021-04-23 | End: 2021-04-25 | Stop reason: HOSPADM

## 2021-04-23 RX ORDER — CIPROFLOXACIN 250 MG/1
250 TABLET, FILM COATED ORAL DAILY
Status: DISCONTINUED | OUTPATIENT
Start: 2021-04-23 | End: 2021-04-25 | Stop reason: HOSPADM

## 2021-04-23 RX ORDER — HALOPERIDOL 5 MG/ML
2 INJECTION INTRAMUSCULAR EVERY 6 HOURS PRN
Status: DISCONTINUED | OUTPATIENT
Start: 2021-04-23 | End: 2021-04-23

## 2021-04-23 RX ORDER — BISACODYL 10 MG
10 SUPPOSITORY, RECTAL RECTAL
Status: COMPLETED | OUTPATIENT
Start: 2021-04-23 | End: 2021-04-23

## 2021-04-23 RX ORDER — LACTULOSE 10 G/15ML
20 SOLUTION ORAL EVERY 8 HOURS
Status: DISCONTINUED | OUTPATIENT
Start: 2021-04-23 | End: 2021-04-25 | Stop reason: HOSPADM

## 2021-04-23 RX ORDER — LACTULOSE 10 G/15ML
20 SOLUTION ORAL ONCE
Status: COMPLETED | OUTPATIENT
Start: 2021-04-23 | End: 2021-04-23

## 2021-04-23 RX ORDER — CIPROFLOXACIN 250 MG/1
250 TABLET, FILM COATED ORAL DAILY
COMMUNITY

## 2021-04-23 RX ORDER — LACTULOSE 10 G/15ML
20 SOLUTION ORAL 3 TIMES DAILY
Status: CANCELLED | OUTPATIENT
Start: 2021-04-23

## 2021-04-23 RX ORDER — ONDANSETRON 4 MG/1
4 TABLET, ORALLY DISINTEGRATING ORAL EVERY 6 HOURS PRN
Status: DISCONTINUED | OUTPATIENT
Start: 2021-04-23 | End: 2021-04-25 | Stop reason: HOSPADM

## 2021-04-23 RX ADMIN — LACTULOSE 20 G: 20 SOLUTION ORAL at 14:41

## 2021-04-23 RX ADMIN — SODIUM CHLORIDE 1000 ML: 9 INJECTION, SOLUTION INTRAVENOUS at 14:08

## 2021-04-23 RX ADMIN — HALOPERIDOL LACTATE 1 MG: 5 INJECTION, SOLUTION INTRAMUSCULAR at 21:22

## 2021-04-23 RX ADMIN — FAMOTIDINE 20 MG: 10 INJECTION, SOLUTION INTRAVENOUS at 18:11

## 2021-04-23 RX ADMIN — BISACODYL 10 MG: 10 SUPPOSITORY RECTAL at 21:48

## 2021-04-23 ASSESSMENT — ENCOUNTER SYMPTOMS
FEVER: 0
CONSTIPATION: 1
CONFUSION: 1
ABDOMINAL PAIN: 0

## 2021-04-23 ASSESSMENT — ACTIVITIES OF DAILY LIVING (ADL): ADLS_ACUITY_SCORE: 14

## 2021-04-23 NOTE — ED NOTES
Bed: ED31  Expected date: 4/23/21  Expected time: 11:21 AM  Means of arrival: Ambulance  Comments:  Virginia Nettles

## 2021-04-23 NOTE — ED TRIAGE NOTES
Pt arrives from home via EMS for increased confusion for the past few days. History of cirrhosis and dementia. VSS. Awaiting family arrival for interpretation and history.

## 2021-04-23 NOTE — ED NOTES
[facility]  ED Nurse Handoff Report    Robb Vallejo is a 81 year old male   ED Chief complaint: Altered Mental Status  . ED Diagnosis:   Final diagnoses:   None     Allergies:   Allergies   Allergen Reactions     Penicillins Other (See Comments)     Syncope       Propranolol      Hallucinations, Passed out.        Code Status: Full Code  Activity level - Baseline/Home:  Stand by Assist. Activity Level - Current:   Assist X 2. Lift room needed: No. Bariatric: No   Needed: Yes   Isolation: No. Infection: Not Applicable.     Vital Signs:   Vitals:    04/23/21 1147 04/23/21 1200 04/23/21 1215 04/23/21 1300   BP: 122/45 (!) 140/58  131/59   Pulse: 102 97  94   Temp: 97.6  F (36.4  C)      TempSrc: Oral      SpO2: 100% 100% 100% 100%       Cardiac Rhythm:  ,      Pain level:    Patient confused: Yes, dementia baseline, increased confusion. Patient Falls Risk: Yes.   Elimination Status: Has not urinated in ED to this point   Patient Report - Initial Complaint: Altered Mental Status. Focused Assessment: Cognitive - Cognitive/Neuro/Behavioral WDL except; orientation  Level of Consciousness: confused  Disoriented to time; situation; place    Follows Commands: inconsistent    Speech: CELESTINA (unable to assess)   Debbie Coma Scale - Best Eye Response: 4-->(E4) spontaneous  Best Motor Response: 6-->(M6) obeys commands  Best Verbal Response: 4-->(V4) confused  [place] Coma Scale Score: 14   Tests Performed: labs, CT. Abnormal Results:   Labs Ordered and Resulted from Time of ED Arrival Up to the Time of Departure from the ED   CBC WITH PLATELETS DIFFERENTIAL - Abnormal; Notable for the following components:       Result Value    RBC Count 3.13 (*)     Hemoglobin 9.3 (*)     Hematocrit 29.3 (*)     RDW 18.1 (*)     Platelet Count 102 (*)     Absolute Lymphocytes 0.5 (*)     All other components within normal limits   COMPREHENSIVE METABOLIC PANEL - Abnormal; Notable for the following components:     Chloride 111 (*)     Glucose 111 (*)     Creatinine 0.62 (*)     Calcium 7.9 (*)     Bilirubin Total 2.1 (*)     Albumin 2.1 (*)     AST 54 (*)     All other components within normal limits   LACTIC ACID WHOLE BLOOD - Abnormal; Notable for the following components:    Lactic Acid 2.6 (*)     All other components within normal limits   AMMONIA - Abnormal; Notable for the following components:    Ammonia 98 (*)     All other components within normal limits   UA MACROSCOPIC WITH REFLEX TO MICRO AND CULTURE   NURSING DRAW AND HOLD     Head CT w/o contrast   Preliminary Result   IMPRESSION: Diffuse cerebral volume loss and cerebral white matter   changes consistent with chronic small vessel ischemic disease. No   evidence for acute intracranial pathology.          Radiation dose for this scan was reduced using automated exposure   control, adjustment of the mA and/or kV according to patient size, or   iterative reconstruction technique          .   Treatments provided: IV fluids, see MAR  Family Comments: son at bedside interpreting  OBS brochure/video discussed/provided to patient:  Yes  ED Medications:   Medications   lactulose (CHRONULAC) solution 20 g (has no administration in time range)   lactulose (CHRONULAC) 200 g in sterile water (bottle) 700 mL rectal enema (has no administration in time range)     Drips infusing:  No  For the majority of the shift, the patient's behavior Green. Interventions performed were Will need sitter to keep pt in bed if son is not present.    Sepsis treatment initiated: No     Patient tested for COVID 19 prior to admission: YES    ED Nurse Name/Phone Number: Sonia Toledo RN,   1:56 PM    RECEIVING UNIT ED HANDOFF REVIEW    Above ED Nurse Handoff Report was reviewed: Yes  Reviewed by: Yen Montano RN on April 23, 2021 at 3:33 PM

## 2021-04-23 NOTE — PHARMACY-ADMISSION MEDICATION HISTORY
Admission medication history interview status for this patient is complete. See [facility] admission navigator for allergy information, prior to admission medications and immunization status.     Medication history interview done, indicate source(s): Family(son)  Medication history resources (including written lists, pill bottles, clinic record):care everywhere, sure scripts  Pharmacy: [facility]    Changes made to PTA medication list:  Added: none  Deleted: none  Changed: cipro from 500mg daily to 250mg daily    Actions taken by pharmacist (provider contacted, etc):None     Additional medication history information:None    Medication reconciliation/reorder completed by provider prior to medication history?  N   (Y/N)     Prior to Admission medications    Medication Sig Last Dose Taking? Auth Provider   ciprofloxacin (CIPRO) 250 MG tablet Take 250 mg by mouth daily 4/21/2021 at Unknown time Yes Unknown, Entered By History   docusate sodium (COLACE) 100 MG capsule Take 100 mg by mouth daily as needed for constipation  Yes Unknown, Entered By History   furosemide (LASIX) 20 MG tablet Take 20 mg by mouth 2 times daily  4/21/2021 at Unknown time Yes Reported, Patient   lactulose (CONSTULOSE) 10 GM/15ML solution Take 20 g by mouth 2 times daily May decrease to once per day if 2 stools per day 4/21/2021 at Unknown time Yes Reported, Patient   spironolactone (ALDACTONE) 50 MG tablet Take 50 mg by mouth 2 times daily 4/21/2021 at Unknown time Yes Unknown, Entered By History

## 2021-04-23 NOTE — ED PROVIDER NOTES
History   Chief Complaint:  Altered Mental Status       History provided by: son. A  was used.      Robb Vallejo is a 81 year old male with history of dementia who presents with altered mental status. The patient is status post open umbilical hernia repair on 4/8/21 for skin rupture with drainage of ascites. The patient has a history of persistent ascites due to cirrhosis despite a patent TIPS. The patient was discharged home on 4/11/21 and has been otherwise doing well. However, the son noted that the patient has been constipated the past 2 days. The patient was given lactulose 2 days ago but then the patient refused to take it anymore as he thought he was feeling better. The son also notes that the patient was more confused today. The patient is still eating normally. The patient has had no fevers or abdominal pain.    Review of Systems   Constitutional: Negative for fever.   Gastrointestinal: Positive for constipation. Negative for abdominal pain.   Psychiatric/Behavioral: Positive for confusion.   All other systems reviewed and are negative.       Allergies:  Penicillins  Propranolol    Medications:  furosemide   spironolactone     Past Medical History:    Cirrhosis   Dementia   Hypertension   Liver carcinoma  Hernia  SBP    Past Surgical History:    Umbilical herniorrhaphy     Social History:  The patient presents by EMS.   PCP: Amanda Aguero     Physical Exam     Patient Vitals for the past 24 hrs:   BP Temp Temp src Pulse Resp SpO2   04/23/21 1612 129/61 97.3  F (36.3  C) Temporal 101 16 100 %   04/23/21 1415 131/57 -- -- 91 -- 100 %   04/23/21 1400 121/56 -- -- 92 -- 100 %   04/23/21 1330 125/54 -- -- 83 -- 100 %   04/23/21 1300 131/59 -- -- 94 -- 100 %   04/23/21 1230 130/53 -- -- 95 -- 100 %   04/23/21 1215 -- -- -- -- -- 100 %   04/23/21 1200 (!) 140/58 -- -- 97 -- 100 %   04/23/21 1147 122/45 97.6  F (36.4  C) Oral 102 -- 100 %   04/23/21 1130 (!) 146/64 -- -- -- -- --        Physical Exam  General: Elderly male lying supine, no acute distress.   Head:  Scalp is atraumatic.  Eyes:  The pupils are equal, round, and reactive to light. Scleral icterus.   ENT:                                      Ears:  The external ears are normal.  Nose:  The external nose is normal.  Throat:  The oropharynx is normal. Mucus membranes are moist.                 Neck:  Normal range of motion.   CV:  tachycardic rate. No murmur. 2+ radial pulses  Resp:  Breath sounds are clear bilaterally. Non-labored, no retractions or accessory muscle use.  GI:  Abdomen is soft, no distension, no tenderness.   MS:  Normal range of motion. No acute deformities.   Skin:  Warm and dry. No rash.   Neuro:  Alert. Strength and sensation grossly intact. No facial asymmetry. Able to move all extremities.   Psych:  Awake. Alert.  Able to state his son's name, though unable to recall the date or where he is.     Emergency Department Course     Imaging:  Head CT w/o contrast  Preliminary Result  IMPRESSION: Diffuse cerebral volume loss and cerebral white matter  changes consistent with chronic small vessel ischemic disease. No  evidence for acute intracranial pathology.   Reading per radiology     Laboratory:  Ammonia on ice: 98 (H)    Lactic Acid whole blood (1150): 2.6 (H)     CBC:  WBC 6.5, HGB 9.3 (L),  (L), o/w WNL    CMP: Chloride 111 (H), Glucose 111 (H), Calcium 7.9 (L), Bilirubin total 2.1 (H), Albumin 2.1 (L), AST 54 (H), Creatinine 0.62 (L), o/w wnl     Asymptomatic COVID-19 Virus (Coronavirus) by PCR Nasopharyngeal swab: pending      UA with microscopic: pending     Emergency Department Course:    Reviewed:  I reviewed the patient's nursing notes, vitals, past medical records, Care Everywhere.     Assessments:  1145  I performed an exam of the patient as documented above.   1323 Patient rechecked and son updated.     Consults:   5535 I spoke with Caridad GAGE for Dr. Fragoso of the hospitalist service  from [facility] regarding patient's presentation, findings, and plan of care.      Interventions:  1408 0.9% sodium chloride BOLUS 1000 mL IV   1441 Lactulose 20 g oral     Disposition:  Admitted.      Impression & Plan   Covid-19  Robb Vallejo was evaluated during a global COVID-19 pandemic, which necessitated consideration that the patient might be at risk for infection with the SARS-CoV-2 virus that causes COVID-19.   Applicable protocols for evaluation were followed during the patient's care.   COVID-19 was considered as part of the patient's evaluation. The plan for testing is:  a test was obtained during this visit.    Medical Decision Making:  Robb Vallejo is a 81 year old male with medical history including Hepatocellular Carcinoma, Liver Cirrhosis, WOODWARD, SBP presents to the ED with 2 days of worsening confusion. He stopped taking his lactulose 2 days ago and since son notes worsening confusion. Also has not had a BM in 2 days. gien history and clinical presentation, suspect hepatic encephalopathy. Head CT without acute findings. Elevated ammonia. UA currently pending. Doubt SBP given no fever, no abdominal tendeness, and no leukocytosis. Son agrees with plan for admission. Lactulose oral given here and discussed plan for admission with MERARI Mccullough.       Diagnosis:    ICD-10-CM    1. Hepatic encephalopathy (H)  K72.90 Asymptomatic SARS-CoV-2 COVID-19 Virus (Coronavirus) by PCR         Scribe Disclosure:  Antione CANALES, am serving as a scribe at 11:45 AM on 4/23/2021 to document services personally performed by Cydney Dent PA-C based on my observations and the provider's statements to me.        Cydney Dent PA-C  04/23/21 1810

## 2021-04-23 NOTE — H&P
[facility]  Internal Medicine  History and Physical      Patient Name: Robb Vallejo MRN# [id number]   Age: 81 year old YOB: 1940     Date of Admission:4/23/2021    Primary care provider: Amanda Aguero  Date of Service: 4/23/2021         Assessment and Plan:   Robb Vallejo is a 81 year old male with a history of HTN, Dementia, Hepatocellular Carcinoma, Liver Cirrhosis, WOODWARD, SBP who presents to the ED with altered mentation.    Hepatic Encephalopathy - pt presents with confusion after stopping his Lactulose two days ago.  Afebrile with normal wbc.  Ammonia 98.  CT head with no acute pathology and no focal neurologic deficits on exam.  Benign abdominal exam.  Lactic acid slightly elevated s/p IVF  - start scheduled and prn Lactulose and titrate to 3-4 stools per day  - serial neuro exams  - UA pending, repeat lactic acid    Liver Cirrhosis 2/2 WOODWARD - with hx of Portal HTN, Esophageal Varices, Ascites s/p TIPS 12/2020, SBP and HCC s/p radiation.  Most recent IR evaluation on 4/13 revealed only mild ascites and paracentesis was deferred.  LFTs at baseline.  Currently no signs of SBP given pt is afebrile, normal wbc and no abdominal pain.  On Ciprofloxacin for SBP ppx   - obtain abdominal ultrasound to evaluate for ascites and will consider IR guided paracentesis  - continue pta Ciprofloxacin  - reassess resuming Spirolactone and Lasix on 4/24    Chronic Anemia/Thrombocytopenia - 2/2 underlying liver disease.  Hgb and plt improved from recent baseline.    CODE: Full  Diet/IVF: regular  GI ppx: pepcid  DVT ppx: scd    Patient discussed with Dr. Fouzia Mccullough MS PA-C  Physician Assistant   Hospitalist Service  Pager: [phone]           Chief Complaint:   Confusion         HPI:   81 year old male with a history of HTN, Dementia, Hepatocellular Carcinoma, Liver Cirrhosis, WOODWARD, SBP who presents to the ED with altered mentation.    History is obtained from chart  review and son at the bedside who prefers to act as the interpretor.  Patient speaks Tigrinya.  Patient has a history of Cirrhosis and is on chronic Lactulose.  He has a hx of ascites s/p TIPS and recent s/p umbilical hernia repair 4/8.  Son reports since his surgery, he has not required a paracentesis and his ascites re accumulation has slowed.  Son reports patient felt that since he was doing better, he could stop his medications two days ago.  Today, patient was more confused.  He is oriented to family, but not to place, time or situation.  Patient denies pain, he has not had any cough, nausea, emesis, fevers.      ED work up revealed patient hemodynamically stable and afebrile.  Laboratory work up revealed Tbili 2.1, AST 54, Lactic Acid, 2.6, Ammonia 98, hgb 9.3, plt 102.  CT head with no acute pathology.  Patient received Lactulose and admitted for further management.         Past Medical History:     Past Medical History:   Diagnosis Date     Cirrhosis (H)      Dementia (H)      Hypertension           Past Surgical History:     Past Surgical History:   Procedure Laterality Date     HERNIORRHAPHY UMBILICAL N/A 4/8/2021    Procedure: HERNIORRHAPHY, UMBILICAL, OPEN;  Surgeon: Dylon Freed MD;  Location:  OR          Social History:     Social History     Socioeconomic History     Marital status:      Spouse name: Not on file     Number of children: Not on file     Years of education: Not on file     Highest education level: Not on file   Occupational History     Not on file   Social Needs     Financial resource strain: Not on file     Food insecurity     Worry: Not on file     Inability: Not on file     Transportation needs     Medical: Not on file     Non-medical: Not on file   Tobacco Use     Smoking status: Unknown If Ever Smoked     Tobacco comment: Current non-smoker.   Substance and Sexual Activity     Alcohol use: No     Alcohol/week: 0.0 standard drinks     Drug use: No     Sexual  activity: Not on file   Lifestyle     Physical activity     Days per week: Not on file     Minutes per session: Not on file     Stress: Not on file   Relationships     Social connections     Talks on phone: Not on file     Gets together: Not on file     Attends L[employer]-day service: Not on file     Active member of club or organization: Not on file     Attends meetings of clubs or organizations: Not on file     Relationship status: Not on file     Intimate partner violence     Fear of current or ex partner: Not on file     Emotionally abused: Not on file     Physically abused: Not on file     Forced sexual activity: Not on file   Other Topics Concern     Not on file   Social History Narrative    4/4/2016: he and his wife live with his adult son and son's family. Independent with all ADLs. Ambulates independently. Speaks [place], non-English speaker.          Family History:   No family history on file.       Allergies:      Allergies   Allergen Reactions     Penicillins Other (See Comments)     Syncope       Propranolol      Hallucinations, Passed out.           Medications:     Prior to Admission medications    Medication Sig Last Dose Taking? Auth Provider   ciprofloxacin (CIPRO) 250 MG tablet Take 250 mg by mouth daily 4/21/2021 at Unknown time Yes Unknown, Entered By History   docusate sodium (COLACE) 100 MG capsule Take 100 mg by mouth daily as needed for constipation  Yes Unknown, Entered By History   furosemide (LASIX) 20 MG tablet Take 20 mg by mouth 2 times daily  4/21/2021 at Unknown time Yes Reported, Patient   lactulose (CONSTULOSE) 10 GM/15ML solution Take 20 g by mouth 2 times daily May decrease to once per day if 2 stools per day 4/21/2021 at Unknown time Yes Reported, Patient   spironolactone (ALDACTONE) 50 MG tablet Take 50 mg by mouth 2 times daily 4/21/2021 at Unknown time Yes Unknown, Entered By History          Review of Systems:   A complete ROS was performed and is negative other than what is  stated in the HPI.       Physical Exam:   Blood pressure 121/56, pulse 92, temperature 97.6  F (36.4  C), temperature source Oral, SpO2 100 %. on room air  General: Alert, interactive with son, pushing me away at times,   HEENT: AT/NC, sclera anicteric, PERRL, EOMI,  Chest/Resp: clear to auscultation bilaterally, no crackles or wheezes  Heart/CV: regular rate and rhythm, no murmur  Abdomen/GI: Soft, nontender, nondistended. +BS.  No guarding.  Will not let me lift his gown to examine his skin  Extremities/MSK: Trace bilateral feed edema, no significant leg edema  Skin: Warm and dry  Neuro: Alert & oriented to family, not oriented to place or situation per son at the bedside, no focal deficits, moves all extremities equally         Labs:   ROUTINE ICU LABS (Last four results)  CMP  Recent Labs   Lab 04/23/21  1145      POTASSIUM 4.4   CHLORIDE 111*   CO2 20   ANIONGAP 6   *   BUN 9   CR 0.62*   GFRESTIMATED >90   GFRESTBLACK >90   JOHN 7.9*   PROTTOTAL 7.1   ALBUMIN 2.1*   BILITOTAL 2.1*   ALKPHOS 125   AST 54*   ALT 28     CBC  Recent Labs   Lab 04/23/21  1145   WBC 6.5   RBC 3.13*   HGB 9.3*   HCT 29.3*   MCV 94   MCH 29.7   MCHC 31.7   RDW 18.1*   *          Imaging/Procedures:     Results for orders placed or performed during the hospital encounter of 04/23/21   Head CT w/o contrast    Narrative    CT OF THE HEAD WITHOUT CONTRAST 4/23/2021 2:36 PM     COMPARISON: Brain MR 4/4/2016    HISTORY: Mental status change, unknown cause    TECHNIQUE: 5 mm thick axial CT images of the head were acquired  without IV contrast material.    FINDINGS: There is moderate diffuse cerebral volume loss. There are  subtle patchy areas of decreased density in the cerebral white matter  bilaterally that are consistent with sequela of chronic small vessel  ischemic disease.     The ventricles and basal cisterns are within normal limits in  configuration given the degree of cerebral volume loss. There is no  midline  shift. There are no extra-axial fluid collections.     No intracranial hemorrhage, mass or recent infarct.    The visualized paranasal sinuses are well-aerated. There is no  mastoiditis. There are no fractures of the visualized bones.       Impression    IMPRESSION: Diffuse cerebral volume loss and cerebral white matter  changes consistent with chronic small vessel ischemic disease. No  evidence for acute intracranial pathology.       Radiation dose for this scan was reduced using automated exposure  control, adjustment of the mA and/or kV according to patient size, or  iterative reconstruction technique

## 2021-04-23 NOTE — PROVIDER NOTIFICATION
wbp admitting MD pt refusing anything by mouth including lactulose and cipro. Also family wanting suppository. And need something for pt for restlessness/anxiety.

## 2021-04-23 NOTE — ED PROVIDER NOTES
Emergency Department Attending Supervision Note  4/23/2021  1:22 PM      I evaluated this patient in conjunction with Cydney Dent PA-C.      Briefly, the patient presented with altered mental status. . Per the patient's son, he has been more confused today. He notes that he has also been constipated for the last two days. He has not been taking his medications. He has had nothing to eat or drink this morning. Lives with son.     On my exam,   General: Laying in bed, sleeping, thin appearing   Head:  The scalp, face, and head appear normal  Eyes:  Conjunctivae are normal  ENT:    The nose is normal    Pinnae are normal    External acoustic canals are normal  Neck:  Trachea midline  CV:  Pulses are normal    Resp:  No respiratory distress   Abdomen:      Soft, non-tender, non-distended  Musc:  Normal muscular tone    No major joint effusions    No asymmetric leg swelling  Skin:  No rash or lesions noted  Neuro: Moves all extremities, does not follow commands    Results:  Imaging:  Head CT w/o contrast  Diffuse cerebral volume loss and cerebral white matter  changes consistent with chronic small vessel ischemic disease. No  evidence for acute intracranial pathology.   Reading per radiology      Laboratory:   Ammonia on ice: 98 (H)  Lactic Acid whole blood (1150): 2.6 (H)   CBC:  WBC 6.5, HGB 9.3 (L),  (L), o/w WNL  CMP: Chloride 111 (H), Glucose 111 (H), Calcium 7.9 (L), Bilirubin total 2.1 (H), Albumin 2.1 (L), AST 54 (H), Creatinine 0.62 (L), o/w WNL   Asymptomatic COVID-19 Virus (Coronavirus) by PCR Nasopharyngeal swab: pending      ED course:  1335  I evaluated the patient.     Patient has a history of cirrhosis. Has been noncomplinant with lactulose. No signs of infection. Ammonia elevated. Will given lactulose and admit to medicine.       Diagnosis    ICD-10-CM    1. Hepatic encephalopathy (H)  K72.90        MD Khadar Guerrier Haley, MD  04/23/21 1818

## 2021-04-23 NOTE — LETTER
Transition Communication Hand-off for Care Transitions to Next Level of Care Provider    Name: Robb Vallejo  : 1940  MRN #: [id number]  Primary Care Provider: Amanda Aguero     Primary Clinic: [address] [address]     Reason for Hospitalization:  Hepatic encephalopathy (H) [K72.90]  Admit Date/Time: 2021 11:29 AM  Discharge Date: 2021  Payor Source: Payor: MEDICARE / Plan: MEDICARE / Product Type: Medicare /     Unplanned Readmission Risk 20%        Reason for Communication Hand-off Referral: Non-adherence to plan of care related to:  Other Unclear understanding of medication purpose    Discharge Plan: Home w/family     Concern for non-adherence with plan of care: Yes     Discharge Needs Assessment:  Needs      Most Recent Value   Equipment Currently Used at Home  cane, straight   # of Referrals Placed by CTS  External Care Coordination, Communication hand-offs to next level of Care Providers        Follow-up specialty is recommended: Yes - Medication Therapy Management.     Follow-up plan:    Future Appointments   Date Time Provider Department Center   2021 11:00 AM [place] [facility] [place] [facility]   2021  3:00 PM CR COVID LAB CRLAB CR   2021  2:00 PM [facility] [place] [facility]   2021  1:30 PM CR COVID LAB CRLAB CR   2021  2:00 PM [facility] [place] [facility]   2021  1:30 PM CR COVID LAB CRLAB CR     Any outstanding tests or procedures:  No.       Referrals     Future Labs/Procedures    Medication Therapy Management Referral     Process Instructions:        This referral will be filtered to a centralized scheduling office at [place] and the patient will receive a call to schedule an appointment at a [place] location most convenient for them.    Comments:    MTM referral reason            Patient has Lactulose or Rifaxamin as a PTA Med or a Discharge medication         This service is designed to  help you get the most from your medications.  A specially trained pharmacist will work closely with you and your doctors  to solve any problems related to your medications and to help you get the   best results from taking them.      The Medication Therapy Management staff will call you to schedule an appointment.              Key Recommendations:  CM met with patient & his son, Karl, at bedside. Patient speaks Tigrinya and has dementia. Son speaks English & is a . He understands medication education. Patient admitted for hepatic encephalopathy after stopping his lactulose since he felt better. Patient lives with son, EVA and 2 grandsons in a house in [place]. He has no issues with stairs. Patient stopped taking his lactulose since he felt better. Discussed mechanism of medication with son. Patient gets his med[facility] at [facility]. No issues with getting his prescriptions. He does not get any services. Discussed community support through [facility] for additional opportunities through the [facility] (adult day programs, community centers for immigrants that speak native language). Patient has dementia; son is doubtful these services would benefit his father.     Francisca Virk RN, BSN, CPHN, CM    AVS/Discharge Summary is the source of truth; this is a helpful guide for improved communication of patient story

## 2021-04-23 NOTE — LETTER
Transition Communication Hand-off for Care Transitions to Next Level of Care Provider    Name: Robb Vallejo  : 1940  MRN #: [id number]  Primary Care Provider: Amanda Aguero     Primary Clinic: [address] [address]    Key Recommendations:  CM met with patient & his son, Karl, at bedside. Patient speaks Tigrinya and has dementia. Son speaks English & is a . He understands medication education. Patient admitted for hepatic encephalopathy after stopping his lactulose since he felt better. Patient lives with son, EVA and 2 grandsons in a house in [place]. He has no issues with stairs. Patient stopped taking his lactulose since he felt better. Discussed mechanism of medication with son. Patient gets his meds filled at [facility]. No issues with getting his prescriptions. He does not get any services. Discussed community support through [facility] for additional opportunities through the [facility] (adult day programs, community centers for immigrants that speak native language). Patient has dementia; son is doubtful these services would benefit his father.     Francisca Virk RN, BSN, CPHN, CM    AVS/Discharge Summary is the source of truth; this is a helpful guide for improved communication of patient story

## 2021-04-23 NOTE — PLAN OF CARE
Pt admitted to the floor from ED around 1730- directly sent to US. Only trace amounts of fluid found. Pt attempted to urinate for UA but missed hat. Son would like us to try to have pt void a few times for straight cath. Pt refusing anything PO, MD aware. Suppository just ordered. Haldol prn available. Up A2. Very confused.

## 2021-04-24 LAB
ABO + RH BLD: NORMAL
ABO + RH BLD: NORMAL
ALBUMIN SERPL-MCNC: 1.7 G/DL (ref 3.4–5)
ALBUMIN UR-MCNC: NEGATIVE MG/DL
ALP SERPL-CCNC: 97 U/L (ref 40–150)
ALT SERPL W P-5'-P-CCNC: 23 U/L (ref 0–70)
AMMONIA PLAS-SCNC: 75 UMOL/L (ref 10–50)
ANION GAP SERPL CALCULATED.3IONS-SCNC: 6 MMOL/L (ref 3–14)
APPEARANCE UR: CLEAR
AST SERPL W P-5'-P-CCNC: 40 U/L (ref 0–45)
BILIRUB SERPL-MCNC: 2.6 MG/DL (ref 0.2–1.3)
BILIRUB UR QL STRIP: NEGATIVE
BLD GP AB SCN SERPL QL: NORMAL
BLD PROD TYP BPU: NORMAL
BLD PROD TYP BPU: NORMAL
BLD UNIT ID BPU: 0
BLOOD BANK CMNT PATIENT-IMP: NORMAL
BLOOD PRODUCT CODE: NORMAL
BPU ID: NORMAL
BUN SERPL-MCNC: 14 MG/DL (ref 7–30)
CALCIUM SERPL-MCNC: 7.5 MG/DL (ref 8.5–10.1)
CHLORIDE SERPL-SCNC: 115 MMOL/L (ref 94–109)
CO2 SERPL-SCNC: 19 MMOL/L (ref 20–32)
COLOR UR AUTO: YELLOW
CREAT SERPL-MCNC: 0.74 MG/DL (ref 0.66–1.25)
ERYTHROCYTE [DISTWIDTH] IN BLOOD BY AUTOMATED COUNT: 17.7 % (ref 10–15)
GFR SERPL CREATININE-BSD FRML MDRD: 88 ML/MIN/{1.73_M2}
GLUCOSE SERPL-MCNC: 81 MG/DL (ref 70–99)
GLUCOSE UR STRIP-MCNC: NEGATIVE MG/DL
HCT VFR BLD AUTO: 20 % (ref 40–53)
HGB BLD-MCNC: 6.4 G/DL (ref 13.3–17.7)
HGB UR QL STRIP: ABNORMAL
KETONES UR STRIP-MCNC: NEGATIVE MG/DL
LEUKOCYTE ESTERASE UR QL STRIP: NEGATIVE
MCH RBC QN AUTO: 29.1 PG (ref 26.5–33)
MCHC RBC AUTO-ENTMCNC: 32 G/DL (ref 31.5–36.5)
MCV RBC AUTO: 91 FL (ref 78–100)
MUCOUS THREADS #/AREA URNS LPF: PRESENT /LPF
NITRATE UR QL: NEGATIVE
NUM BPU REQUESTED: 1
PH UR STRIP: 6 PH (ref 5–7)
PLATELET # BLD AUTO: 70 10E9/L (ref 150–450)
POTASSIUM SERPL-SCNC: 4 MMOL/L (ref 3.4–5.3)
PROT SERPL-MCNC: 5.5 G/DL (ref 6.8–8.8)
RBC # BLD AUTO: 2.2 10E12/L (ref 4.4–5.9)
RBC #/AREA URNS AUTO: <1 /HPF (ref 0–2)
SODIUM SERPL-SCNC: 140 MMOL/L (ref 133–144)
SOURCE: ABNORMAL
SP GR UR STRIP: 1.01 (ref 1–1.03)
SPECIMEN EXP DATE BLD: NORMAL
SQUAMOUS #/AREA URNS AUTO: <1 /HPF (ref 0–1)
TRANSFUSION STATUS PATIENT QL: NORMAL
TRANSFUSION STATUS PATIENT QL: NORMAL
UROBILINOGEN UR STRIP-MCNC: NORMAL MG/DL (ref 0–2)
WBC # BLD AUTO: 3 10E9/L (ref 4–11)
WBC #/AREA URNS AUTO: 6 /HPF (ref 0–5)

## 2021-04-24 PROCEDURE — 250N000013 HC RX MED GY IP 250 OP 250 PS 637: Performed by: INTERNAL MEDICINE

## 2021-04-24 PROCEDURE — 80053 COMPREHEN METABOLIC PANEL: CPT | Performed by: PHYSICIAN ASSISTANT

## 2021-04-24 PROCEDURE — 99233 SBSQ HOSP IP/OBS HIGH 50: CPT | Performed by: INTERNAL MEDICINE

## 2021-04-24 PROCEDURE — 81001 URINALYSIS AUTO W/SCOPE: CPT | Performed by: PHYSICIAN ASSISTANT

## 2021-04-24 PROCEDURE — 86850 RBC ANTIBODY SCREEN: CPT | Performed by: INTERNAL MEDICINE

## 2021-04-24 PROCEDURE — 250N000013 HC RX MED GY IP 250 OP 250 PS 637: Performed by: PHYSICIAN ASSISTANT

## 2021-04-24 PROCEDURE — P9016 RBC LEUKOCYTES REDUCED: HCPCS | Performed by: INTERNAL MEDICINE

## 2021-04-24 PROCEDURE — 120N000001 HC R&B MED SURG/OB

## 2021-04-24 PROCEDURE — 86900 BLOOD TYPING SEROLOGIC ABO: CPT | Performed by: INTERNAL MEDICINE

## 2021-04-24 PROCEDURE — 82140 ASSAY OF AMMONIA: CPT | Performed by: PHYSICIAN ASSISTANT

## 2021-04-24 PROCEDURE — 86923 COMPATIBILITY TEST ELECTRIC: CPT | Performed by: INTERNAL MEDICINE

## 2021-04-24 PROCEDURE — 85027 COMPLETE CBC AUTOMATED: CPT | Performed by: PHYSICIAN ASSISTANT

## 2021-04-24 PROCEDURE — 250N000009 HC RX 250: Performed by: PHYSICIAN ASSISTANT

## 2021-04-24 PROCEDURE — 36415 COLL VENOUS BLD VENIPUNCTURE: CPT | Performed by: PHYSICIAN ASSISTANT

## 2021-04-24 PROCEDURE — 86901 BLOOD TYPING SEROLOGIC RH(D): CPT | Performed by: INTERNAL MEDICINE

## 2021-04-24 RX ORDER — FUROSEMIDE 20 MG
20 TABLET ORAL
Status: DISCONTINUED | OUTPATIENT
Start: 2021-04-24 | End: 2021-04-25 | Stop reason: HOSPADM

## 2021-04-24 RX ORDER — SPIRONOLACTONE 25 MG/1
50 TABLET ORAL 2 TIMES DAILY
Status: DISCONTINUED | OUTPATIENT
Start: 2021-04-24 | End: 2021-04-25 | Stop reason: HOSPADM

## 2021-04-24 RX ADMIN — FAMOTIDINE 20 MG: 10 INJECTION, SOLUTION INTRAVENOUS at 06:33

## 2021-04-24 RX ADMIN — LACTULOSE 20 G: 10 SOLUTION ORAL at 19:42

## 2021-04-24 RX ADMIN — FUROSEMIDE 20 MG: 20 TABLET ORAL at 13:37

## 2021-04-24 RX ADMIN — LACTULOSE 20 G: 10 SOLUTION ORAL at 13:41

## 2021-04-24 RX ADMIN — CIPROFLOXACIN HYDROCHLORIDE 250 MG: 250 TABLET, FILM COATED ORAL at 08:22

## 2021-04-24 RX ADMIN — FAMOTIDINE 20 MG: 10 INJECTION, SOLUTION INTRAVENOUS at 18:11

## 2021-04-24 RX ADMIN — RIFAXIMIN 550 MG: 550 TABLET ORAL at 13:40

## 2021-04-24 RX ADMIN — SPIRONOLACTONE 50 MG: 25 TABLET ORAL at 19:38

## 2021-04-24 RX ADMIN — SPIRONOLACTONE 50 MG: 25 TABLET ORAL at 13:38

## 2021-04-24 RX ADMIN — LACTULOSE 20 G: 10 SOLUTION ORAL at 08:21

## 2021-04-24 RX ADMIN — RIFAXIMIN 550 MG: 550 TABLET ORAL at 19:38

## 2021-04-24 RX ADMIN — FUROSEMIDE 20 MG: 20 TABLET ORAL at 18:16

## 2021-04-24 ASSESSMENT — ACTIVITIES OF DAILY LIVING (ADL)
ADLS_ACUITY_SCORE: 14

## 2021-04-24 NOTE — PLAN OF CARE
Nurse from [id number]  Pt alert but no children here to interpret to ask about date. Pt did follow all of staffs requests like touch nose and shrug shoulders. Pt also set off the bed alarm twice because he did not call and got up to go to the BR without help. Staff assisted pt to the bathroom and than applied and IPAD on monitor to him for safety. Pt had 2 loose BMS this shift. Standard tray ordered for dinner.

## 2021-04-24 NOTE — PLAN OF CARE
Presentation/Diagnosis: Hepatic Encephalopathy  History: dementia, cirrhosis, HTN, hepatocellular carcinoma  Labs/Protocols: Hgb 6.4 - 1 unit PRBCs transfused. BC pending. Abd US+ for trace ascites.   Vitals: /58 (BP Location: Right arm)   Pulse 88   Temp 97.8  F (36.6  C) (Temporal)   Resp 16   SpO2 99%   Cardiac: Murmur detected. Trace edema to BLE.   Telemetry: N/A  Respiratory: LS coarse. No cough noted.   Neuro: Confused at baseline per family, A/O to self only. Neuro checks q4 hours. See flowsheets. Pt participation minimal.   GI/: Voiding adequately. Abd tender.   Skin: Intact, minor linear scratches noted to back. Repositions self.   LDAs: PIV SL. L AC.   Diet: Reg, family brings food.   Activity: 1a gb/w. PT consulted.   Pain: PAINAD scale utilized. PRN tylenol available.   Plan: Recheck CBC, BMP and ammonia in AM. Continue lactulose and diuretics. Anticipate return home with son and daughter-in-law as early as tomorrow.

## 2021-04-24 NOTE — PLAN OF CARE
PRIMARY DIAGNOSIS: Hepatic Encephalopathy  GOALS TO BE MET BEFORE DISCHARGE:  1. ADLs back to baseline:  CELESTINA    2. Activity and level of assistance: A2, GB, Walker    3. Pain status: Pain free.    4. Return to near baseline physical activity:  [facility]     Discharge Planner Nurse   Safe discharge environment identified: Yes  Barriers to discharge: Yes, imaging of abdomen, mental status       Entered by: Nel Mccollum 04/24/2021 5:58 AM   Pt has dementia at baseline. VSS, afebrile, on RA. LS diminished, no cough noted. Up A1-3, GB, w/c. Incontinent of bowel and bladder. +1-2 edema in ankles and feet. Low sodium diet. Refused lactalose, spoke w/son advised that pt was refusing prior to admission, may need suppository to pass stool.

## 2021-04-24 NOTE — PLAN OF CARE
DX: AMS, Constipation  Tele: na  A&O Disoriented x4  Activity: A-2 gait belt, walker, pivoted to bed  Diet: Reg, Low Na  VSS: Q4 and Q4 neuros pt not participating  O2: %  BG: na  PIV: SL-LA  Pain: does not show any signs of pain  GI/: constipation suppository given, need UA pt missed hat, pt had  BM 2230  Labs: Ammonia 98. Lactic acid 2.6, 2.5  Plan: continue POC, designated visitors are his son and daughter in law, they also interpret  for patient  Discharge: TBD  Pt is refusing anything by mouth  [id number]

## 2021-04-24 NOTE — PROGRESS NOTES
[facility]    Medicine Progress Note - Hospitalist Service       Date of Admission:  4/23/2021  Assessment & Plan       Robb Vallejo is a 81 year old male with a history of HTN, Dementia, Hepatocellular Carcinoma, Liver Cirrhosis, WOODWARD, SBP who presents to the ED with altered mentation.     Hepatic Encephalopathy - pt presents with confusion after stopping his Lactulose two days ago.  Afebrile with normal wbc.  Ammonia 98.  CT head with no acute pathology and no focal neurologic deficits on exam.  Benign abdominal exam.  Lactic acid slightly elevated s/p IVF  - start scheduled and prn Lactulose and titrate to 3-4 stools per day       Liver Cirrhosis 2/2 WOODWARD - with hx of Portal HTN, Esophageal Varices, Ascites s/p TIPS 12/2020, SBP and HCC s/p radiation.  Most recent IR evaluation on 4/13 revealed only mild ascites and paracentesis was deferred.  LFTs at baseline.  Currently no signs of SBP given pt is afebrile, normal wbc and no abdominal pain.  On Ciprofloxacin for SBP ppx   - obtain abdominal ultrasound to evaluate for ascites and will consider IR guided paracentesis  - continue pta Ciprofloxacin  - reassess resuming Spirolactone and Lasix on 4/24     Chronic Anemia/Thrombocytopenia - 2/2 underlying liver disease.   - will transfuse 1 unit PRBC 4/24 for Hb <7.      Diet: Combination Diet Regular Diet Adult    DVT Prophylaxis: Pneumatic Compression Devices  Verma Catheter: not present  Code Status: Full Code           Disposition Plan   Expected discharge: 1-2 days, recommended to prior living arrangement once mental status at baseline.  Entered: Corbin Fragoso MD 04/24/2021, 10:40 AM       The patient's care was discussed with the Patient and Patient's Family.    Corbin Fragoso MD  Hospitalist Service  [facility]  Contact information available via [facility] Paging/Directory    ______________________________________________________________________    Interval  History     No fever/chills. Poor historian. Less confused per family.    Data reviewed today: I reviewed all medications, new labs and imaging results over the last 24 hours. I personally reviewed no images or EKG's today.    Physical Exam   Vital Signs: Temp: 98.5  F (36.9  C) Temp src: Temporal BP: 135/60 Pulse: 91   Resp: 16 SpO2: 100 % O2 Device: None (Room air)    Weight: 0 lbs 0 oz    Gen - Alert, awake, seen with daughter in law who interprets. Oriented to self.  Lungs - CTA B anteriorly.  Heart - RR,S1+S2 nml, no m/g/r.  Abd - soft, NT, ND, + BS.  Ext - 1+ edema.    Data   Recent Labs   Lab 04/24/21  0710 04/23/21  1145   WBC 3.0* 6.5   HGB 6.4* 9.3*   MCV 91 94   PLT 70* 102*    137   POTASSIUM 4.0 4.4   CHLORIDE 115* 111*   CO2 19* 20   BUN 14 9   CR 0.74 0.62*   ANIONGAP 6 6   JOHN 7.5* 7.9*   GLC 81 111*   ALBUMIN 1.7* 2.1*   PROTTOTAL 5.5* 7.1   BILITOTAL 2.6* 2.1*   ALKPHOS 97 125   ALT 23 28   AST 40 54*     Recent Results (from the past 24 hour(s))   Head CT w/o contrast    Narrative    CT OF THE HEAD WITHOUT CONTRAST 4/23/2021 2:36 PM     COMPARISON: Brain MR 4/4/2016    HISTORY: Mental status change, unknown cause    TECHNIQUE: 5 mm thick axial CT images of the head were acquired  without IV contrast material.    FINDINGS: There is moderate diffuse cerebral volume loss. There are  subtle patchy areas of decreased density in the cerebral white matter  bilaterally that are consistent with sequela of chronic small vessel  ischemic disease.     The ventricles and basal cisterns are within normal limits in  configuration given the degree of cerebral volume loss. There is no  midline shift. There are no extra-axial fluid collections.     No intracranial hemorrhage, mass or recent infarct.    The visualized paranasal sinuses are well-aerated. There is no  mastoiditis. There are no fractures of the visualized bones.       Impression    IMPRESSION: Diffuse cerebral volume loss and cerebral white  matter  changes consistent with chronic small vessel ischemic disease. No  evidence for acute intracranial pathology.       Radiation dose for this scan was reduced using automated exposure  control, adjustment of the mA and/or kV according to patient size, or  iterative reconstruction technique    ELVI STORM MD   US Abdomen Limited    Narrative    US ABDOMEN LIMITED 4/23/2021 4:32 PM    CLINICAL HISTORY: hx of ascites, evaluate amount, AMS, Cirrhosis, hx  of Ascites  TECHNIQUE: Limited abdominal ultrasound.    COMPARISON: 4/16/2021      Impression    IMPRESSION:  1.  Trace ascites in the right upper and lower quadrants.    ERICA NUNEZ MD     Medications       ciprofloxacin  250 mg Oral Daily     famotidine  20 mg Intravenous Q12H     lactulose  20 g Oral Q8H     rifaximin  550 mg Oral BID     sodium chloride (PF)  3 mL Intracatheter Q8H

## 2021-04-24 NOTE — PROVIDER NOTIFICATION
DATE:  4/24/2021   TIME OF RECEIPT FROM LAB:  0743  LAB TEST:  Hemoglobin  LAB VALUE:  6.4  RESULTS GIVEN VIA TEXT PAGE TO: Dr Fragoso    TIME LAB VALUE REPORTED TO PROVIDER:   0748    Call back from MD at 0750. Per MD plan to type/screen and consent to be obtained when son arrives.

## 2021-04-25 VITALS
TEMPERATURE: 97.8 F | DIASTOLIC BLOOD PRESSURE: 58 MMHG | SYSTOLIC BLOOD PRESSURE: 130 MMHG | OXYGEN SATURATION: 100 % | HEART RATE: 87 BPM | RESPIRATION RATE: 18 BRPM

## 2021-04-25 LAB
AMMONIA PLAS-SCNC: 82 UMOL/L (ref 10–50)
ANION GAP SERPL CALCULATED.3IONS-SCNC: 5 MMOL/L (ref 3–14)
BASOPHILS # BLD AUTO: 0 10E9/L (ref 0–0.2)
BASOPHILS NFR BLD AUTO: 0.3 %
BUN SERPL-MCNC: 15 MG/DL (ref 7–30)
CALCIUM SERPL-MCNC: 7.8 MG/DL (ref 8.5–10.1)
CHLORIDE SERPL-SCNC: 116 MMOL/L (ref 94–109)
CO2 SERPL-SCNC: 19 MMOL/L (ref 20–32)
CREAT SERPL-MCNC: 0.87 MG/DL (ref 0.66–1.25)
DIFFERENTIAL METHOD BLD: ABNORMAL
EOSINOPHIL # BLD AUTO: 0.2 10E9/L (ref 0–0.7)
EOSINOPHIL NFR BLD AUTO: 6.2 %
ERYTHROCYTE [DISTWIDTH] IN BLOOD BY AUTOMATED COUNT: 17.2 % (ref 10–15)
GFR SERPL CREATININE-BSD FRML MDRD: 82 ML/MIN/{1.73_M2}
GLUCOSE SERPL-MCNC: 88 MG/DL (ref 70–99)
HCT VFR BLD AUTO: 24.1 % (ref 40–53)
HGB BLD-MCNC: 7.8 G/DL (ref 13.3–17.7)
IMM GRANULOCYTES # BLD: 0 10E9/L (ref 0–0.4)
IMM GRANULOCYTES NFR BLD: 0.6 %
LYMPHOCYTES # BLD AUTO: 0.3 10E9/L (ref 0.8–5.3)
LYMPHOCYTES NFR BLD AUTO: 10.6 %
MCH RBC QN AUTO: 29.1 PG (ref 26.5–33)
MCHC RBC AUTO-ENTMCNC: 32.4 G/DL (ref 31.5–36.5)
MCV RBC AUTO: 90 FL (ref 78–100)
MONOCYTES # BLD AUTO: 0.5 10E9/L (ref 0–1.3)
MONOCYTES NFR BLD AUTO: 16.1 %
NEUTROPHILS # BLD AUTO: 2.1 10E9/L (ref 1.6–8.3)
NEUTROPHILS NFR BLD AUTO: 66.2 %
NRBC # BLD AUTO: 0 10*3/UL
NRBC BLD AUTO-RTO: 0 /100
PLATELET # BLD AUTO: 62 10E9/L (ref 150–450)
POTASSIUM SERPL-SCNC: 3.5 MMOL/L (ref 3.4–5.3)
RBC # BLD AUTO: 2.68 10E12/L (ref 4.4–5.9)
SODIUM SERPL-SCNC: 140 MMOL/L (ref 133–144)
WBC # BLD AUTO: 3.2 10E9/L (ref 4–11)

## 2021-04-25 PROCEDURE — 82140 ASSAY OF AMMONIA: CPT | Performed by: INTERNAL MEDICINE

## 2021-04-25 PROCEDURE — 85025 COMPLETE CBC W/AUTO DIFF WBC: CPT | Performed by: INTERNAL MEDICINE

## 2021-04-25 PROCEDURE — 99239 HOSP IP/OBS DSCHRG MGMT >30: CPT | Performed by: INTERNAL MEDICINE

## 2021-04-25 PROCEDURE — 250N000013 HC RX MED GY IP 250 OP 250 PS 637: Performed by: PHYSICIAN ASSISTANT

## 2021-04-25 PROCEDURE — 80048 BASIC METABOLIC PNL TOTAL CA: CPT | Performed by: INTERNAL MEDICINE

## 2021-04-25 PROCEDURE — 36415 COLL VENOUS BLD VENIPUNCTURE: CPT | Performed by: INTERNAL MEDICINE

## 2021-04-25 PROCEDURE — 250N000013 HC RX MED GY IP 250 OP 250 PS 637: Performed by: INTERNAL MEDICINE

## 2021-04-25 RX ORDER — LACTULOSE 10 G/15ML
20 SOLUTION ORAL 3 TIMES DAILY
Qty: 2700 ML | Refills: 0 | Status: SHIPPED | OUTPATIENT
Start: 2021-04-25 | End: 2021-05-25

## 2021-04-25 RX ORDER — FAMOTIDINE 20 MG/1
20 TABLET, FILM COATED ORAL 2 TIMES DAILY
Status: DISCONTINUED | OUTPATIENT
Start: 2021-04-25 | End: 2021-04-25 | Stop reason: HOSPADM

## 2021-04-25 RX ADMIN — FUROSEMIDE 20 MG: 20 TABLET ORAL at 09:46

## 2021-04-25 RX ADMIN — LACTULOSE 20 G: 10 SOLUTION ORAL at 06:11

## 2021-04-25 RX ADMIN — CIPROFLOXACIN HYDROCHLORIDE 250 MG: 250 TABLET, FILM COATED ORAL at 09:43

## 2021-04-25 RX ADMIN — FAMOTIDINE 20 MG: 20 TABLET ORAL at 09:43

## 2021-04-25 RX ADMIN — SPIRONOLACTONE 50 MG: 25 TABLET ORAL at 09:46

## 2021-04-25 RX ADMIN — RIFAXIMIN 550 MG: 550 TABLET ORAL at 09:48

## 2021-04-25 ASSESSMENT — ACTIVITIES OF DAILY LIVING (ADL)
TOILETING_ISSUES: NO
WEAR_GLASSES_OR_BLIND: NO
WALKING_OR_CLIMBING_STAIRS: AMBULATION DIFFICULTY, REQUIRES EQUIPMENT
DIFFICULTY_EATING/SWALLOWING: NO
ADLS_ACUITY_SCORE: 14
CONCENTRATING,_REMEMBERING_OR_MAKING_DECISIONS_DIFFICULTY: YES
EQUIPMENT_CURRENTLY_USED_AT_HOME: CANE, STRAIGHT
DOING_ERRANDS_INDEPENDENTLY_DIFFICULTY: NO
DIFFICULTY_COMMUNICATING: YES
ADLS_ACUITY_SCORE: 14
FALL_HISTORY_WITHIN_LAST_SIX_MONTHS: NO
INTERPRETER_SERVICES_OFFERED_TO_THE_PATIENT: NO
DRESSING/BATHING_DIFFICULTY: YES
COMMUNICATION: OTHER (SEE COMMENTS)
ADLS_ACUITY_SCORE: 14
DRESSING/BATHING: BATHING DIFFICULTY, ASSISTANCE 1 PERSON
HEARING_DIFFICULTY_OR_DEAF: NO
PATIENT_/_FAMILY_COMMUNICATION_STYLE: SPOKEN LANGUAGE (NON-ENGLISH)
WALKING_OR_CLIMBING_STAIRS_DIFFICULTY: YES

## 2021-04-25 NOTE — CONSULTS
Care Management Initial Consult    General Information  Assessment completed with: Children, Obi (son)  Type of CM/SW Visit: Initial Assessment    Primary Care Provider verified and updated as needed: Yes   Readmission within the last 30 days: previous discharge plan unsuccessful   Return Category: Exacerbation of disease  Reason for Consult: care coordination/care conference, discharge planning  Advance Care Planning: Advance Care Planning Reviewed: no concerns identified        Communication Assessment  Patient's communication style: spoken language (non-English)    Hearing Difficulty or Deaf: no   Wear Glasses or Blind: no    Cognitive  Cognitive/Neuro/Behavioral: WDL  Level of Consciousness: alert  Arousal Level: opens eyes spontaneously  Orientation: disoriented to, place, time, situation  Mood/Behavior: calm  Best Language: 0 - No aphasia  Speech: CELESTINA (unable to assess)    Living Environment:   People in home: child(david), adult, grandchild(david)  Obi (son), Estefany (DIL) grandsons  Current living Arrangements: house      Able to return to prior arrangements: yes     Family/Social Support:  Care provided by: spouse/significant other, child(david)  Provides care for:    Marital Status:   Wife, Children          Description of Support System: Supportive, Involved       Current Resources:   Patient receiving home care services: No     Community Resources: None(Discussed adult day programs, [facility] support w/son)  Equipment currently used at home: cane, straight, hospital bed  Supplies currently used at home: None    Employment/Financial:  Employment Status: unemployed        Financial Concerns: No concerns identified     Lifestyle & Psychosocial Needs:    Socioeconomic History     Marital status:      Spouse name: Not on file     Number of children: Not on file     Years of education: Not on file     Highest education level: Not on file     Tobacco Use     Smoking status: Unknown If Ever Smoked     Tobacco  comment: Current non-smoker.   Substance and Sexual Activity     Alcohol use: No     Alcohol/week: 0.0 standard drinks     Drug use: No     Functional Status:  Prior to admission patient needed assistance: w/mobility. Uses straight cane. Has hospital bed.      Mental Health Status:  Mental Health Status: No Current Concerns       Chemical Dependency Status:  Chemical Dependency Status: No Current Concerns         Values/Beliefs:  Spiritual, Cultural Beliefs, Nondenominational Practices, Values that affect care: no             Additional Information:  CM met with patient & his son, Karl, at bedside. Patient speaks Tigrinya and has dementia. Son speaks English & is a . He understands medication education. Patient admitted for hepatic encephalopathy after stopping his lactulose since he felt better. Patient lives with son, EVA and 2 grandsons in a house in [place]. He has no issues with stairs. Patient stopped taking his lactulose since he felt better. Discussed mechanism of medication with son. Patient gets his meds filled at [facility]. No issues with getting his prescriptions. He does not get any services. Discussed community support through [facility] for additional opportunities through the [facility] (adult day programs, community centers for immigrants that speak native language). Patient has dementia; son is doubtful these services would benefit his father. CM will send handoff to patient's PCP, Amanda Aguero.     CM will continue to follow patient until discharge for any additional needs.     Franicsca Virk RN, BSN, CPHN, CM  Inpatient Care Coordination - [facility]  [phone]      Nataly Vikr CM

## 2021-04-25 NOTE — DISCHARGE SUMMARY
[facility]  Hospitalist Discharge Summary      Date of Admission:  4/23/2021  Date of Discharge:  4/25/2021  Discharging Provider: Corbin Fragoso MD      Discharge Diagnoses     1. Hepatic encephalopathy.    2. Acute on chronic anemia related to ACD.    Follow-ups Needed After Discharge   Follow-up Appointments     Follow-up and recommended labs and tests       Follow up with primary care provider, Amanda gAuero, within 7 days   for hospital follow- up.  The following labs/tests are recommended:   ammonia, cbc.             Unresulted Labs Ordered in the Past 30 Days of this Admission     Date and Time Order Name Status Description    4/23/2021 1604 Blood culture Preliminary     4/23/2021 1604 Blood culture Preliminary       These results will be followed up by PCP.    Discharge Disposition   Discharged to home  Condition at discharge: Stable    Hospital Course         Robb Vallejo is a 81 year old male with a history of HTN, Dementia, Hepatocellular Carcinoma, Liver Cirrhosis, WOODWARD, SBP who presents to the ED with altered mentation.     Hepatic Encephalopathy - pt presents with confusion after stopping his Lactulose two days ago.  Afebrile with normal wbc.  Ammonia 98.  CT head with no acute pathology and no focal neurologic deficits on exam.  Benign abdominal exam.  Lactic acid slightly elevated s/p IVF  - start scheduled and prn Lactulose and titrate to 3-4 stools per day       Liver Cirrhosis 2/2 WOODWARD - with hx of Portal HTN, Esophageal Varices, Ascites s/p TIPS 12/2020, SBP and HCC s/p radiation.  Most recent IR evaluation on 4/13 revealed only mild ascites and paracentesis was deferred.  LFTs at baseline.  Currently no signs of SBP given pt is afebrile, normal wbc and no abdominal pain.  On Ciprofloxacin for SBP ppx   - obtain abdominal ultrasound to evaluate for ascites and will consider IR guided paracentesis  - continue pta Ciprofloxacin  - reassess resuming Spirolactone  and Lasix on 4/24     Chronic Anemia/Thrombocytopenia - 2/2 underlying liver disease.   - will transfuse 1 unit PRBC 4/24 for Hb <7.     Admitted as inpatient. S/p 1 unit PRBC. Mentation has improved to almost baseline per family despite elevated ammonia level. No focal neuro deficit. Will plan on discharge today and can f/u with PCP in 1 week for ammonia check. Will increase lactulose for 3-5 BM's/day.      Consultations This Hospital Stay   PHYSICAL THERAPY ADULT IP CONSULT  CARE MANAGEMENT / SOCIAL WORK IP CONSULT    Code Status   Full Code    Time Spent on this Encounter   I, Corbin Fragoso MD, personally saw the patient today and spent greater than 30 minutes discharging this patient.       Corbin Fragoso MD  [facility]  [address] [place] [address]  Phone: [phone]  Fax: [phone]  ______________________________________________________________________    Physical Exam   Vital Signs: Temp: 97.8  F (36.6  C) Temp src: Temporal BP: 130/58 Pulse: 87   Resp: 18 SpO2: 100 % O2 Device: None (Room air)    Weight: 0 lbs 0 oz    Gen - Alert, awake, seen with son who interprets. Oriented to self.  Lungs - CTA B anteriorly.  Heart - RR,S1+S2 nml, no m/g/r.  Abd - soft, NT, ND, + BS.  Ext - 1+ edema.       Primary Care Physician   Amanda Aguero    Discharge Orders      Medication Therapy Management Referral      Follow-up and recommended labs and tests     Follow up with primary care provider, Amanda Aguero, within 7 days for hospital follow- up.  The following labs/tests are recommended: ammonia, cbc.     Activity    Your activity upon discharge: activity as tolerated     Full Code     Diet    Follow this diet upon discharge: Orders Placed This Encounter      Room Service      Combination Diet 2 gm sodium       Significant Results and Procedures   Results for orders placed or performed during the hospital encounter of 04/23/21   Head CT w/o contrast     Narrative    CT OF THE HEAD WITHOUT CONTRAST 4/23/2021 2:36 PM     COMPARISON: Brain MR 4/4/2016    HISTORY: Mental status change, unknown cause    TECHNIQUE: 5 mm thick axial CT images of the head were acquired  without IV contrast material.    FINDINGS: There is moderate diffuse cerebral volume loss. There are  subtle patchy areas of decreased density in the cerebral white matter  bilaterally that are consistent with sequela of chronic small vessel  ischemic disease.     The ventricles and basal cisterns are within normal limits in  configuration given the degree of cerebral volume loss. There is no  midline shift. There are no extra-axial fluid collections.     No intracranial hemorrhage, mass or recent infarct.    The visualized paranasal sinuses are well-aerated. There is no  mastoiditis. There are no fractures of the visualized bones.       Impression    IMPRESSION: Diffuse cerebral volume loss and cerebral white matter  changes consistent with chronic small vessel ischemic disease. No  evidence for acute intracranial pathology.       Radiation dose for this scan was reduced using automated exposure  control, adjustment of the mA and/or kV according to patient size, or  iterative reconstruction technique    ELVI STORM MD   US Abdomen Limited    Narrative    US ABDOMEN LIMITED 4/23/2021 4:32 PM    CLINICAL HISTORY: hx of ascites, evaluate amount, AMS, Cirrhosis, hx  of Ascites  TECHNIQUE: Limited abdominal ultrasound.    COMPARISON: 4/16/2021      Impression    IMPRESSION:  1.  Trace ascites in the right upper and lower quadrants.    ERICA NUNEZ MD       Discharge Medications   Current Discharge Medication List      CONTINUE these medications which have CHANGED    Details   lactulose (CONSTULOSE) 10 GM/15ML solution Take 30 mLs (20 g) by mouth 3 times daily  Qty: 2700 mL, Refills: 0    Comments: Titrate to get 3-5 BM's/day.  Associated Diagnoses: Hepatic encephalopathy (H)         CONTINUE these  medications which have NOT CHANGED    Details   ciprofloxacin (CIPRO) 250 MG tablet Take 250 mg by mouth daily      docusate sodium (COLACE) 100 MG capsule Take 100 mg by mouth daily as needed for constipation      furosemide (LASIX) 20 MG tablet Take 20 mg by mouth 2 times daily       spironolactone (ALDACTONE) 50 MG tablet Take 50 mg by mouth 2 times daily           Allergies   Allergies   Allergen Reactions     Penicillins Other (See Comments)     Syncope       Propranolol      Hallucinations, Passed out.

## 2021-04-25 NOTE — PROGRESS NOTES
Transition Communication Hand-off for Care Transitions to Next Level of Care Provider    Name: Robb Vallejo  : 1940  MRN #: [id number]  Primary Care Provider: Amanda Aguero     Primary Clinic: [address] [address]     Reason for Hospitalization:  Hepatic encephalopathy (H) [K72.90]  Admit Date/Time: 2021 11:29 AM  Discharge Date: 2021  Payor Source: Payor: MEDICARE / Plan: MEDICARE / Product Type: Medicare /     Unplanned Readmission Risk 20%        Reason for Communication Hand-off Referral: Non-adherence to plan of care related to:  Other Unclear understanding of medication purpose    Discharge Plan: Home w/family     Concern for non-adherence with plan of care: Yes     Discharge Needs Assessment:  Needs      Most Recent Value   Equipment Currently Used at Home  cane, straight   # of Referrals Placed by CTS  External Care Coordination, Communication hand-offs to next level of Care Providers        Follow-up specialty is recommended: Yes - Medication Therapy Management.     Follow-up plan:    Future Appointments   Date Time Provider Department Center   2021 11:00 AM [facility]   2021  3:00 PM CR [facility] LAB [facility]   2021  2:00 PM [facility]   2021  1:30 PM CR COVID LAB [facility] CR   2021  2:00 PM [facility]   2021  1:30 PM CR COVID LAB [facility] CR     Any outstanding tests or procedures:  No.       Referrals     Future Labs/Procedures    Medication Therapy Management Referral     Process Instructions:        This referral will be filtered to a centralized scheduling office at [facility] and the patient will receive a call to schedule an appointment at a [facility] location most convenient for them.    Comments:    MTM referral reason            Patient has Lactulose or Rifaxamin as a PTA Med or a Discharge medication         This service is designed to  help you get the most from your medications.  A specially trained pharmacist will work closely with you and your doctors  to solve any problems related to your medications and to help you get the   best results from taking them.      The Medication Therapy Management staff will call you to schedule an appointment.              Key Recommendations:  CM met with patient & his son, Karl, at bedside. Patient speaks Tigrinya and has dementia. Son speaks English & is a . He understands medication education. Patient admitted for hepatic encephalopathy after stopping his lactulose since he felt better. Patient lives with son, EVA and 2 grandsons in a house in [place]. He has no issues with stairs. Patient stopped taking his lactulose since he felt better. Discussed mechanism of medication with son. Patient [facility] his med[facility] at [facility]. No issues with getting his prescriptions. He does not get any services. Discussed community support through [facility] for additional opportunities through the [facility] (adult day programs, community centers for immigrants that speak native language). Patient has dementia; son is doubtful these services would benefit his father.     Francisca Virk RN, BSN, CPHN, CM    AVS/Discharge Summary is the source of truth; this is a helpful guide for improved communication of patient story

## 2021-04-25 NOTE — PLAN OF CARE
PRIMARY DIAGNOSIS: Hepatic Encephalopathy   GOALS TO BE MET BEFORE DISCHARGE:  1. ADLs back to baseline: Yes    2. Activity and level of assistance: Up with Ax1/gbelt and cane.    3. Pain status: Pain free.    4. Return to near baseline physical activity: Yes, Pt has some difficult following directions, cognitive impairment at base but seems better today per daughter in law.      Discharge Planner Nurse   Safe discharge environment identified: Yes  Barriers to discharge: Yes       Entered by: [place] 04/25/2021 1:13 AM   Alert to self, used pt daughter in-law for  for assesment  as pt was not able to follow direction when using Ipad . Gets OOB not able to remember to use the call light. Ipad on monitor for safety. Ate dinner 100%. Ambulated in hallways and to bathroom with SBA,gbelt and cane. loose BMx1 overnight. Passing flatus.Voiding good amounts. Urine sample collected. Neuro intact. Discharge TBD.

## 2021-04-25 NOTE — PLAN OF CARE
Pt son was here this am and talked to the doctor and the patient was cleared to go home. This staff reviewed the AVS with the patients son, Karl and he signed the AVS and for the filled script of lactulose. Education sheets given about the lactulose and hepatic encephalopathy. The son stated he would read them at home and did not want staff to review them with him. Son packed up all of his items but a glass dark bottle was left behind in the room with some liquid and the NST was going to call the patients son and ask if they still wanted it. Pt was escorted to the [employer] car via  and the NA at 1040.

## 2021-04-29 LAB
BACTERIA SPEC CULT: NO GROWTH
BACTERIA SPEC CULT: NO GROWTH
Lab: NORMAL
Lab: NORMAL
SPECIMEN SOURCE: NORMAL
SPECIMEN SOURCE: NORMAL

## 2021-10-04 ENCOUNTER — HEALTH MAINTENANCE LETTER (OUTPATIENT)
Age: 81
End: 2021-10-04

## 2022-01-23 ENCOUNTER — HEALTH MAINTENANCE LETTER (OUTPATIENT)
Age: 82
End: 2022-01-23

## 2022-09-11 ENCOUNTER — HEALTH MAINTENANCE LETTER (OUTPATIENT)
Age: 82
End: 2022-09-11

## 2023-04-30 ENCOUNTER — HEALTH MAINTENANCE LETTER (OUTPATIENT)
Age: 83
End: 2023-04-30

## 2024-05-04 ENCOUNTER — HEALTH MAINTENANCE LETTER (OUTPATIENT)
Age: 84
End: 2024-05-04

## 2025-01-31 NOTE — PLAN OF CARE
Admission/Transfer from: Admission from [place] [id number] RN skin assessment completed. Yes  Significant findings include: Bruising on L forearm  WOC Nurse Consult Ordered? No  Was NST/NA able to join during assessment? No  Bed algorithm can be found in [facility] flowsheets and forms binder, was this used for this assessment? No  Was a pulsate mattress ordered? No       Attending Only

## (undated) DEVICE — ADH SKIN CLOSURE PREMIERPRO EXOFIN 1.0ML 3470

## (undated) DEVICE — SUCTION MANIFOLD NEPTUNE 2 SYS 4 PORT 0702-020-000

## (undated) DEVICE — GLOVE PROTEXIS BLUE W/NEU-THERA 8.0  2D73EB80

## (undated) DEVICE — LINEN TOWEL PACK X5 5464

## (undated) DEVICE — SU PDS II 2-0 CT-1 27" Z339H

## (undated) DEVICE — SYR BULB IRRIG 50ML LATEX FREE 0035280

## (undated) DEVICE — Device

## (undated) DEVICE — GLOVE PROTEXIS W/NEU-THERA 7.5  2D73TE75

## (undated) DEVICE — SOL ADH LIQUID BENZOIN SWAB 0.6ML C1544

## (undated) DEVICE — ESU GROUND PAD ADULT W/CORD E7507

## (undated) DEVICE — SU VICRYL 3-0 SH 27" J316H

## (undated) DEVICE — LINEN TOWEL PACK X6 WHITE 5487

## (undated) DEVICE — SU ETHILON 3-0 PS-1 18" 1663H

## (undated) DEVICE — SU MONOCRYL 4-0 PS-2 27" UND Y426H

## (undated) DEVICE — SOL WATER IRRIG 1000ML BOTTLE 2F7114

## (undated) DEVICE — CLOSURE SYS SKIN PREMIERPRO EXOFIN FUSION 4X22CM STRL 3472

## (undated) DEVICE — SOL NACL 0.9% IRRIG 1000ML BOTTLE 2F7124

## (undated) RX ORDER — FENTANYL CITRATE 50 UG/ML
INJECTION, SOLUTION INTRAMUSCULAR; INTRAVENOUS
Status: DISPENSED
Start: 2021-04-08

## (undated) RX ORDER — LIDOCAINE HYDROCHLORIDE 10 MG/ML
INJECTION, SOLUTION EPIDURAL; INFILTRATION; INTRACAUDAL; PERINEURAL
Status: DISPENSED
Start: 2021-04-08

## (undated) RX ORDER — CIPROFLOXACIN 2 MG/ML
INJECTION, SOLUTION INTRAVENOUS
Status: DISPENSED
Start: 2021-04-08

## (undated) RX ORDER — ALBUMIN, HUMAN INJ 5% 5 %
SOLUTION INTRAVENOUS
Status: DISPENSED
Start: 2021-04-08

## (undated) RX ORDER — DEXTROSE MONOHYDRATE 25 G/50ML
INJECTION, SOLUTION INTRAVENOUS
Status: DISPENSED
Start: 2021-04-08

## (undated) RX ORDER — EPINEPHRINE 1 MG/ML
INJECTION, SOLUTION INTRAMUSCULAR; SUBCUTANEOUS
Status: DISPENSED
Start: 2021-04-08

## (undated) RX ORDER — BUPIVACAINE HYDROCHLORIDE 5 MG/ML
INJECTION, SOLUTION EPIDURAL; INTRACAUDAL
Status: DISPENSED
Start: 2021-04-08